# Patient Record
Sex: FEMALE | Race: WHITE | Employment: OTHER | URBAN - METROPOLITAN AREA
[De-identification: names, ages, dates, MRNs, and addresses within clinical notes are randomized per-mention and may not be internally consistent; named-entity substitution may affect disease eponyms.]

---

## 2017-10-11 ENCOUNTER — SURG/PROC ORDERS (OUTPATIENT)
Dept: ANESTHESIOLOGY | Age: 82
End: 2017-10-11

## 2017-10-11 RX ORDER — SODIUM CHLORIDE 0.9 % (FLUSH) 0.9 %
10 SYRINGE (ML) INJECTION PRN
Status: CANCELLED | OUTPATIENT
Start: 2017-10-11

## 2017-10-11 RX ORDER — SODIUM CHLORIDE 0.9 % (FLUSH) 0.9 %
10 SYRINGE (ML) INJECTION EVERY 12 HOURS SCHEDULED
Status: CANCELLED | OUTPATIENT
Start: 2017-10-11

## 2017-10-11 RX ORDER — SODIUM CHLORIDE 9 MG/ML
INJECTION, SOLUTION INTRAVENOUS CONTINUOUS
Status: CANCELLED | OUTPATIENT
Start: 2017-10-11

## 2017-10-12 ENCOUNTER — HOSPITAL ENCOUNTER (OUTPATIENT)
Dept: SURGERY | Age: 82
Discharge: HOME | End: 2017-10-12
Attending: OPHTHALMOLOGY | Admitting: OPHTHALMOLOGY

## 2017-10-12 VITALS
RESPIRATION RATE: 16 BRPM | HEART RATE: 62 BPM | SYSTOLIC BLOOD PRESSURE: 112 MMHG | DIASTOLIC BLOOD PRESSURE: 55 MMHG | OXYGEN SATURATION: 96 % | TEMPERATURE: 97 F

## 2017-10-12 LAB
PERFORMED ON: ABNORMAL
POC POTASSIUM: 3.1 MMOL/L (ref 3.5–5.1)
POC SAMPLE TYPE: ABNORMAL
POTASSIUM: 3.1

## 2017-10-12 RX ORDER — SODIUM CHLORIDE 0.9 % (FLUSH) 0.9 %
10 SYRINGE (ML) INJECTION PRN
Status: DISCONTINUED | OUTPATIENT
Start: 2017-10-12 | End: 2017-10-12 | Stop reason: SDUPTHER

## 2017-10-12 RX ORDER — ONDANSETRON 2 MG/ML
4 INJECTION INTRAMUSCULAR; INTRAVENOUS
Status: ACTIVE | OUTPATIENT
Start: 2017-10-12 | End: 2017-10-12

## 2017-10-12 RX ORDER — CLINDAMYCIN PHOSPHATE 900 MG/50ML
900 INJECTION INTRAVENOUS
Status: COMPLETED | OUTPATIENT
Start: 2017-10-12 | End: 2017-10-12

## 2017-10-12 RX ORDER — FENTANYL CITRATE 50 UG/ML
50 INJECTION, SOLUTION INTRAMUSCULAR; INTRAVENOUS EVERY 5 MIN PRN
Status: DISCONTINUED | OUTPATIENT
Start: 2017-10-12 | End: 2017-10-13 | Stop reason: HOSPADM

## 2017-10-12 RX ORDER — SODIUM CHLORIDE 0.9 % (FLUSH) 0.9 %
10 SYRINGE (ML) INJECTION EVERY 12 HOURS SCHEDULED
Status: DISCONTINUED | OUTPATIENT
Start: 2017-10-12 | End: 2017-10-13 | Stop reason: HOSPADM

## 2017-10-12 RX ORDER — SODIUM CHLORIDE 9 MG/ML
INJECTION, SOLUTION INTRAVENOUS CONTINUOUS
Status: DISCONTINUED | OUTPATIENT
Start: 2017-10-12 | End: 2017-10-13 | Stop reason: HOSPADM

## 2017-10-12 RX ORDER — OXYCODONE HYDROCHLORIDE AND ACETAMINOPHEN 5; 325 MG/1; MG/1
2 TABLET ORAL PRN
Status: ACTIVE | OUTPATIENT
Start: 2017-10-12 | End: 2017-10-12

## 2017-10-12 RX ORDER — MORPHINE SULFATE 4 MG/ML
1 INJECTION, SOLUTION INTRAMUSCULAR; INTRAVENOUS EVERY 5 MIN PRN
Status: DISCONTINUED | OUTPATIENT
Start: 2017-10-12 | End: 2017-10-13 | Stop reason: HOSPADM

## 2017-10-12 RX ORDER — OXYCODONE HYDROCHLORIDE AND ACETAMINOPHEN 5; 325 MG/1; MG/1
1 TABLET ORAL PRN
Status: ACTIVE | OUTPATIENT
Start: 2017-10-12 | End: 2017-10-12

## 2017-10-12 RX ORDER — MEPERIDINE HYDROCHLORIDE 25 MG/ML
12.5 INJECTION INTRAMUSCULAR; INTRAVENOUS; SUBCUTANEOUS EVERY 5 MIN PRN
Status: DISCONTINUED | OUTPATIENT
Start: 2017-10-12 | End: 2017-10-13 | Stop reason: HOSPADM

## 2017-10-12 RX ORDER — MORPHINE SULFATE 4 MG/ML
2 INJECTION, SOLUTION INTRAMUSCULAR; INTRAVENOUS EVERY 5 MIN PRN
Status: DISCONTINUED | OUTPATIENT
Start: 2017-10-12 | End: 2017-10-13 | Stop reason: HOSPADM

## 2017-10-12 RX ORDER — SODIUM CHLORIDE 0.9 % (FLUSH) 0.9 %
10 SYRINGE (ML) INJECTION EVERY 12 HOURS SCHEDULED
Status: DISCONTINUED | OUTPATIENT
Start: 2017-10-12 | End: 2017-10-12 | Stop reason: SDUPTHER

## 2017-10-12 RX ORDER — FENTANYL CITRATE 50 UG/ML
25 INJECTION, SOLUTION INTRAMUSCULAR; INTRAVENOUS EVERY 5 MIN PRN
Status: DISCONTINUED | OUTPATIENT
Start: 2017-10-12 | End: 2017-10-13 | Stop reason: HOSPADM

## 2017-10-12 RX ORDER — SODIUM CHLORIDE 0.9 % (FLUSH) 0.9 %
10 SYRINGE (ML) INJECTION PRN
Status: DISCONTINUED | OUTPATIENT
Start: 2017-10-12 | End: 2017-10-13 | Stop reason: HOSPADM

## 2017-10-12 RX ADMIN — CLINDAMYCIN PHOSPHATE 900 MG: 900 INJECTION INTRAVENOUS at 07:46

## 2017-10-12 ASSESSMENT — PAIN - FUNCTIONAL ASSESSMENT: PAIN_FUNCTIONAL_ASSESSMENT: 0-10

## 2017-10-12 ASSESSMENT — ENCOUNTER SYMPTOMS: SHORTNESS OF BREATH: 1

## 2017-10-12 ASSESSMENT — PAIN SCALES - GENERAL: PAINLEVEL_OUTOF10: 0

## 2017-10-12 NOTE — ANESTHESIA PRE-OP
Department of Anesthesiology  Preprocedure Note       Name:  Sarita Jc   Age:  [de-identified] y.o.  :  1935                                          MRN:  [id number]         Date:  10/12/2017      [facility] Department of Anesthesiology  Pre-Anesthesia Evaluation/Consultation       Name:  Sarita Jc                                         Age:  [de-identified] y.o. MRN:  [id number]           Procedure (Scheduled): B lower lid ectropion rep.   Surgeon:  Dr. Kath Canas Other     Sulfa Antibiotics      Patient denies    Sulfacetamide     Sulfur      Patient Active Problem List   Diagnosis    Vaginal atrophy    History of endometrial cancer    Frequent UTI    H/O total hysterectomy with bilateral salpingo-oophorectomy (BSO)    Encounter for gynecological examination     Past Medical History:   Diagnosis Date    Asthma     Cancer ([facility] [address]     skin cancer basal and squamous    History of blood transfusion     Hyperlipidemia     Hypertension     Leg swelling     Osteoarthritis     UTI symptoms     Venous insufficiency of leg      Past Surgical History:   Procedure Laterality Date    APPENDECTOMY      CARPAL TUNNEL RELEASE      HEMORRHOID SURGERY      HERNIA REPAIR      HYSTERECTOMY      JOINT REPLACEMENT Bilateral     bilateral TKR    NASAL SEPTUM SURGERY      CT DILATION/CURETTAGE,DIAGNOSTIC      D & C    CT TOTAL KNEE ARTHROPLASTY Right 2000    knee surgery    CT VAGINAL HYSTERECTOMY,UTERUS 250 GMS/<  2004    Hysterectomy    TONSILLECTOMY      VEIN SURGERY      legs     Social History   Substance Use Topics    Smoking status: Former Smoker    Smokeless tobacco: Never Used    Alcohol use 0.0 oz/week      Comment: occ     Medications  Current Outpatient Prescriptions on File Prior to Encounter   Medication Sig Dispense Refill    furosemide (LASIX) 20 MG tablet Take 20 mg by mouth 2 times daily      Budesonide-Formoterol Fumarate (SYMBICORT IN) Inhale 2 Inhalers into the lungs as needed      bacitracin 500 UNIT/GM ophthalmic ointment every 4 hours Every 4 hours.  captopril-hydrochlorothiazide (CAPOZIDE) 50-25 MG per tablet Take 1 tablet by mouth daily      conjugated estrogens (PREMARIN) 0.625 MG/GM vaginal cream Place . 5 gram vaginally three times a week at bedtime with applicator. 1 Tube 11    amitriptyline (ELAVIL) 25 MG tablet Take 50 mg by mouth nightly       amLODIPine (NORVASC) 2.5 MG tablet Take 10 mg by mouth daily       nitrofurantoin (MACRODANTIN) 50 MG capsule Take 50 mg by mouth 4 times daily.  Potassium 99 MG TABS Take  by mouth.  simvastatin (ZOCOR) 20 MG tablet Take 20 mg by mouth nightly. No current facility-administered medications on file prior to encounter. Current Outpatient Prescriptions   Medication Sig Dispense Refill    furosemide (LASIX) 20 MG tablet Take 20 mg by mouth 2 times daily      Budesonide-Formoterol Fumarate (SYMBICORT IN) Inhale 2 Inhalers into the lungs as needed      bacitracin 500 UNIT/GM ophthalmic ointment every 4 hours Every 4 hours.  captopril-hydrochlorothiazide (CAPOZIDE) 50-25 MG per tablet Take 1 tablet by mouth daily      conjugated estrogens (PREMARIN) 0.625 MG/GM vaginal cream Place . 5 gram vaginally three times a week at bedtime with applicator. 1 Tube 11    amitriptyline (ELAVIL) 25 MG tablet Take 50 mg by mouth nightly       amLODIPine (NORVASC) 2.5 MG tablet Take 10 mg by mouth daily       nitrofurantoin (MACRODANTIN) 50 MG capsule Take 50 mg by mouth 4 times daily.  Potassium 99 MG TABS Take  by mouth.  simvastatin (ZOCOR) 20 MG tablet Take 20 mg by mouth nightly. No current facility-administered medications for this encounter. Vital Signs (Current) There were no vitals filed for this visit.   Vital Signs Statistics (for past 48 hrs)     No Data Recorded    BP Readings from Last 3 Encounters:   12/07/16 100/68   10/13/16 130/59   09/29/15 124/82 Take 20 mg by mouth nightly. Historical Provider, MD       Current medications:    Current Outpatient Prescriptions   Medication Sig Dispense Refill    furosemide (LASIX) 20 MG tablet Take 20 mg by mouth 2 times daily      Budesonide-Formoterol Fumarate (SYMBICORT IN) Inhale 2 Inhalers into the lungs as needed      bacitracin 500 UNIT/GM ophthalmic ointment every 4 hours Every 4 hours.  captopril-hydrochlorothiazide (CAPOZIDE) 50-25 MG per tablet Take 1 tablet by mouth daily      conjugated estrogens (PREMARIN) 0.625 MG/GM vaginal cream Place . 5 gram vaginally three times a week at bedtime with applicator. 1 Tube 11    amitriptyline (ELAVIL) 25 MG tablet Take 50 mg by mouth nightly       amLODIPine (NORVASC) 2.5 MG tablet Take 10 mg by mouth daily       nitrofurantoin (MACRODANTIN) 50 MG capsule Take 50 mg by mouth 4 times daily.  Potassium 99 MG TABS Take  by mouth.  simvastatin (ZOCOR) 20 MG tablet Take 20 mg by mouth nightly. No current facility-administered medications for this encounter. Allergies:     Allergies   Allergen Reactions    Other     Sulfa Antibiotics      Patient denies    Sulfacetamide     Sulfur        Problem List:    Patient Active Problem List   Diagnosis Code    Vaginal atrophy N95.2    History of endometrial cancer Z85.42    Frequent UTI N39.0    H/O total hysterectomy with bilateral salpingo-oophorectomy (BSO) Z90.710    Encounter for gynecological examination Z01.419       Past Medical History:        Diagnosis Date    Asthma     Cancer ([facility] [address]     skin cancer basal and squamous    History of blood transfusion     Hyperlipidemia     Hypertension     Leg swelling     Osteoarthritis     UTI symptoms     Venous insufficiency of leg        Past Surgical History:        Procedure Laterality Date    APPENDECTOMY  1969    CARPAL TUNNEL RELEASE      HEMORRHOID SURGERY      HERNIA REPAIR      HYSTERECTOMY      JOINT REPLACEMENT Bilateral bilateral TKR    NASAL SEPTUM SURGERY      OR DILATION/CURETTAGE,DIAGNOSTIC      D & C    OR TOTAL KNEE ARTHROPLASTY Right 2000    knee surgery    OR VAGINAL HYSTERECTOMY,UTERUS 250 GMS/<  2004    Hysterectomy    TONSILLECTOMY      VEIN SURGERY      legs       Social History:    Social History   Substance Use Topics    Smoking status: Former Smoker    Smokeless tobacco: Never Used    Alcohol use 0.0 oz/week      Comment: occ                                Counseling given: Not Answered      Vital Signs (Current): There were no vitals filed for this visit. BP Readings from Last 3 Encounters:   12/07/16 100/68   10/13/16 130/59   09/29/15 124/82       NPO Status:  mn+, see mar                                                                               BMI:   Wt Readings from Last 3 Encounters:   10/09/17 230 lb (104.3 kg)   12/07/16 218 lb (98.9 kg)   09/29/15 221 lb (100.2 kg)     There is no height or weight on file to calculate BMI. Anesthesia Evaluation  Patient summary reviewed no history of anesthetic complications:   Airway: Mallampati: III  TM distance: <3 FB   Neck ROM: limited  Mouth opening: > = 3 FB Dental:          Pulmonary: breath sounds clear to auscultation  (+) shortness of breath (exert):  sleep apnea: on noncompliant,  asthma (daily symbicort , stable):                            Cardiovascular:    (+) hypertension:, SHAY:,         Rhythm: regular  Rate: normal           Beta Blocker:  Not on Beta Blocker         Neuro/Psych:   {neg ROS              GI/Hepatic/Renal: neg ROS  (+) renal disease: CRI,           Endo/Other:    (+) : arthritis:. Abdominal:   (+) obese,     Abdomen: soft. Vascular:                                  Anesthesia Plan      MAC     ASA 3       Induction: intravenous. Anesthetic plan and risks discussed with patient. Plan discussed with CRNA.             This pre-anesthesia assessment may be

## 2017-10-12 NOTE — ANESTHESIA POST-OP
[facility] Department of Anesthesiology  Post-Anesthesia Note       Name:  Golden Palmer                                         Age:  [de-identified] y.o. MRN:  [id number]     Last Vitals & Oxygen Saturation: BP (!) 112/55   Pulse 62   Temp 97 °F (36.1 °C) (Temporal)   Resp 16   SpO2 96%   No data found.       Level of consciousness: awake, alert and oriented    Respiratory: stable     Cardiovascular: stable     Hydration: stable     PONV: stable     Post-op pain: adequate analgesia    Post-op assessment: no apparent anesthetic complications and tolerated procedure well    Complications:  none    Haile Joseph MD  October 12, 2017   12:45 PM

## 2017-10-12 NOTE — H&P
Date of Surgery Update:  Britt Cheatham was seen, history and physical examination reviewed, and patient examined by me today.  There have been no significant clinical changes since the completion of the previous history and physical.    Electronically signed by: Kaylie Sanderson MD,10/12/2017,7:15 AM

## 2022-04-01 ENCOUNTER — HOSPITAL ENCOUNTER (OUTPATIENT)
Age: 87
Setting detail: OUTPATIENT SURGERY
Discharge: HOME | End: 2022-04-01
Attending: INTERNAL MEDICINE | Admitting: INTERNAL MEDICINE
Payer: MEDICARE

## 2022-04-01 VITALS
DIASTOLIC BLOOD PRESSURE: 65 MMHG | HEART RATE: 62 BPM | OXYGEN SATURATION: 95 % | RESPIRATION RATE: 15 BRPM | WEIGHT: 205 LBS | BODY MASS INDEX: 38.71 KG/M2 | HEIGHT: 61 IN | SYSTOLIC BLOOD PRESSURE: 145 MMHG

## 2022-04-01 PROCEDURE — 3609019000 HC EGD CAPSULE ENDOSCOPY: Performed by: INTERNAL MEDICINE

## 2022-04-01 PROCEDURE — 2720000010 HC SURG SUPPLY STERILE: Performed by: INTERNAL MEDICINE

## 2022-04-01 ASSESSMENT — PAIN - FUNCTIONAL ASSESSMENT: PAIN_FUNCTIONAL_ASSESSMENT: 0-10

## 2022-04-01 NOTE — PROGRESS NOTES
Written and verbal instructions given to pt. Belt and  applied to pt over clothing.  is flashing blue as verification of device pairing. Pt swallowed capsule without difficulty. Discharge instructions given. Pt discharged per wheelchair.      Capsule ID: [id number]  Lot #: [id number]

## 2022-04-07 NOTE — PROCEDURES
[address] [place] [address] 16                                 PROCEDURE NOTE    PATIENT NAME: Tony Acosta                  :        1935  MED REC NO:   [id number]                          ROOM:  ACCOUNT NO:   [de-identified]                           ADMIT DATE: 2022  PROVIDER:     Elsa Goldstein MD    DATE OF PROCEDURE:  2022    PROCEDURE PERFORMED:  Capsule endoscopy. PRIMARY PROVIDER:  _____    INDICATION:  Anemia. PROCEDURE:  The esophageal picture was obtained at 28 seconds. At 1  minute 11 seconds, a little duodenal erosion was seen. At 1 hour 12  minutes 16 seconds, there were erosions which could be duodenitis. There was an AVM seen in the duodenum at 1 hour 15 minutes and 28  seconds. There were additional AVM seen at 1 hour 31 minutes and 26  seconds and 1 hour 42 minutes and 15 seconds. 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 RECOMMENDATION:  They follow her blood count, that they avoid  anticoagulants, that she continues with iron and if her blood count  drops precipitously, I would consider an upper endoscopy with a  pediatric colonoscope for possible ablation.         Precious Singer MD    D: 2022 15:56:53       T: 2022 18:03:20     [email or web address]  Job#: [id number]     Doc#: [id number]    CC:  Elsa Goldstein MD

## 2022-12-30 ENCOUNTER — HOSPITAL ENCOUNTER (EMERGENCY)
Age: 87
Discharge: ANOTHER HOSPITAL | End: 2022-12-31
Attending: EMERGENCY MEDICINE
Payer: MEDICARE

## 2022-12-30 ENCOUNTER — APPOINTMENT (OUTPATIENT)
Dept: CT IMAGING | Age: 87
End: 2022-12-30
Payer: MEDICARE

## 2022-12-30 ENCOUNTER — APPOINTMENT (OUTPATIENT)
Dept: GENERAL RADIOLOGY | Age: 87
End: 2022-12-30
Payer: MEDICARE

## 2022-12-30 DIAGNOSIS — R77.8 ELEVATED TROPONIN: ICD-10-CM

## 2022-12-30 DIAGNOSIS — S06.33AA FOCAL HEMORRHAGIC CONTUSION OF CEREBRUM: ICD-10-CM

## 2022-12-30 DIAGNOSIS — S06.6XAA SUBARACHNOID HEMATOMA, WITH UNKNOWN LOSS OF CONSCIOUSNESS STATUS, INITIAL ENCOUNTER: Primary | ICD-10-CM

## 2022-12-30 DIAGNOSIS — N17.9 AKI (ACUTE KIDNEY INJURY) (HCC): ICD-10-CM

## 2022-12-30 LAB
A/G RATIO: 1.2 (ref 1.1–2.2)
ALBUMIN SERPL-MCNC: 3.4 G/DL (ref 3.4–5)
ALP BLD-CCNC: 149 U/L (ref 40–129)
ALT SERPL-CCNC: 11 U/L (ref 10–40)
ANION GAP SERPL CALCULATED.3IONS-SCNC: 9 MMOL/L (ref 3–16)
APTT: 35.4 SEC (ref 23–34.3)
AST SERPL-CCNC: 21 U/L (ref 15–37)
BASOPHILS ABSOLUTE: 0.1 K/UL (ref 0–0.2)
BASOPHILS RELATIVE PERCENT: 1.8 %
BILIRUB SERPL-MCNC: 0.5 MG/DL (ref 0–1)
BILIRUBIN URINE: NEGATIVE
BLOOD, URINE: NEGATIVE
BUN BLDV-MCNC: 17 MG/DL (ref 7–20)
CALCIUM SERPL-MCNC: 9.1 MG/DL (ref 8.3–10.6)
CHLORIDE BLD-SCNC: 99 MMOL/L (ref 99–110)
CLARITY: CLEAR
CO2: 30 MMOL/L (ref 21–32)
COLOR: YELLOW
CREAT SERPL-MCNC: 1.7 MG/DL (ref 0.6–1.2)
EOSINOPHILS ABSOLUTE: 0.1 K/UL (ref 0–0.6)
EOSINOPHILS RELATIVE PERCENT: 2.1 %
GFR SERPL CREATININE-BSD FRML MDRD: 29 ML/MIN/{1.73_M2}
GLUCOSE BLD-MCNC: 108 MG/DL (ref 70–99)
GLUCOSE URINE: NEGATIVE MG/DL
HCT VFR BLD CALC: 29 % (ref 36–48)
HEMOGLOBIN: 9.2 G/DL (ref 12–16)
INR BLD: 1.06 (ref 0.87–1.14)
KETONES, URINE: NEGATIVE MG/DL
LEUKOCYTE ESTERASE, URINE: NEGATIVE
LYMPHOCYTES ABSOLUTE: 1.1 K/UL (ref 1–5.1)
LYMPHOCYTES RELATIVE PERCENT: 29.1 %
MCH RBC QN AUTO: 28.9 PG (ref 26–34)
MCHC RBC AUTO-ENTMCNC: 31.9 G/DL (ref 31–36)
MCV RBC AUTO: 90.7 FL (ref 80–100)
MICROSCOPIC EXAMINATION: ABNORMAL
MONOCYTES ABSOLUTE: 0.5 K/UL (ref 0–1.3)
MONOCYTES RELATIVE PERCENT: 13.1 %
NEUTROPHILS ABSOLUTE: 2 K/UL (ref 1.7–7.7)
NEUTROPHILS RELATIVE PERCENT: 53.9 %
NITRITE, URINE: NEGATIVE
PDW BLD-RTO: 16.8 % (ref 12.4–15.4)
PH UA: 7.5 (ref 5–8)
PLATELET # BLD: 167 K/UL (ref 135–450)
PMV BLD AUTO: 9 FL (ref 5–10.5)
POTASSIUM SERPL-SCNC: 3.9 MMOL/L (ref 3.5–5.1)
PROTEIN UA: NEGATIVE MG/DL
PROTHROMBIN TIME: 13.7 SEC (ref 11.7–14.5)
RBC # BLD: 3.2 M/UL (ref 4–5.2)
SARS-COV-2, NAAT: NOT DETECTED
SODIUM BLD-SCNC: 138 MMOL/L (ref 136–145)
SPECIFIC GRAVITY UA: 1.01 (ref 1–1.03)
TOTAL PROTEIN: 6.3 G/DL (ref 6.4–8.2)
TROPONIN: 0.02 NG/ML
URINE REFLEX TO CULTURE: ABNORMAL
URINE TYPE: ABNORMAL
UROBILINOGEN, URINE: 2 E.U./DL
WBC # BLD: 3.7 K/UL (ref 4–11)

## 2022-12-30 PROCEDURE — 72125 CT NECK SPINE W/O DYE: CPT

## 2022-12-30 PROCEDURE — 70450 CT HEAD/BRAIN W/O DYE: CPT

## 2022-12-30 PROCEDURE — 6370000000 HC RX 637 (ALT 250 FOR IP): Performed by: PHYSICIAN ASSISTANT

## 2022-12-30 PROCEDURE — 99285 EMERGENCY DEPT VISIT HI MDM: CPT

## 2022-12-30 PROCEDURE — 81003 URINALYSIS AUTO W/O SCOPE: CPT

## 2022-12-30 PROCEDURE — 85730 THROMBOPLASTIN TIME PARTIAL: CPT

## 2022-12-30 PROCEDURE — 70496 CT ANGIOGRAPHY HEAD: CPT

## 2022-12-30 PROCEDURE — 85610 PROTHROMBIN TIME: CPT

## 2022-12-30 PROCEDURE — 6360000004 HC RX CONTRAST MEDICATION: Performed by: EMERGENCY MEDICINE

## 2022-12-30 PROCEDURE — 80053 COMPREHEN METABOLIC PANEL: CPT

## 2022-12-30 PROCEDURE — 93005 ELECTROCARDIOGRAM TRACING: CPT | Performed by: EMERGENCY MEDICINE

## 2022-12-30 PROCEDURE — 85025 COMPLETE CBC W/AUTO DIFF WBC: CPT

## 2022-12-30 PROCEDURE — 71045 X-RAY EXAM CHEST 1 VIEW: CPT

## 2022-12-30 PROCEDURE — 87635 SARS-COV-2 COVID-19 AMP PRB: CPT

## 2022-12-30 PROCEDURE — 84484 ASSAY OF TROPONIN QUANT: CPT

## 2022-12-30 RX ORDER — ACETAMINOPHEN 325 MG/1
650 TABLET ORAL ONCE
Status: COMPLETED | OUTPATIENT
Start: 2022-12-30 | End: 2022-12-30

## 2022-12-30 RX ADMIN — IOPAMIDOL 75 ML: 755 INJECTION, SOLUTION INTRAVENOUS at 22:28

## 2022-12-30 RX ADMIN — ACETAMINOPHEN 650 MG: 325 TABLET, FILM COATED ORAL at 21:21

## 2022-12-30 ASSESSMENT — ENCOUNTER SYMPTOMS
CONSTIPATION: 0
ABDOMINAL PAIN: 0
EYE DISCHARGE: 0
EYE ITCHING: 0
VOMITING: 0
COUGH: 0
COLOR CHANGE: 0
SHORTNESS OF BREATH: 0

## 2022-12-30 ASSESSMENT — PAIN SCALES - GENERAL
PAINLEVEL_OUTOF10: 4
PAINLEVEL_OUTOF10: 7

## 2022-12-30 ASSESSMENT — PAIN - FUNCTIONAL ASSESSMENT: PAIN_FUNCTIONAL_ASSESSMENT: 0-10

## 2022-12-30 ASSESSMENT — PAIN DESCRIPTION - LOCATION: LOCATION: HEAD

## 2022-12-30 ASSESSMENT — LIFESTYLE VARIABLES
HOW MANY STANDARD DRINKS CONTAINING ALCOHOL DO YOU HAVE ON A TYPICAL DAY: PATIENT DOES NOT DRINK
HOW OFTEN DO YOU HAVE A DRINK CONTAINING ALCOHOL: NEVER

## 2022-12-31 ENCOUNTER — HOSPITAL ENCOUNTER (INPATIENT)
Age: 87
LOS: 5 days | Discharge: NURSING, REHAB OR LONG-TERM CARE | DRG: 083 | End: 2023-01-05
Attending: INTERNAL MEDICINE | Admitting: INTERNAL MEDICINE
Payer: MEDICARE

## 2022-12-31 ENCOUNTER — APPOINTMENT (OUTPATIENT)
Dept: CT IMAGING | Age: 87
DRG: 083 | End: 2022-12-31
Attending: INTERNAL MEDICINE
Payer: MEDICARE

## 2022-12-31 VITALS
OXYGEN SATURATION: 99 % | RESPIRATION RATE: 14 BRPM | TEMPERATURE: 97.3 F | HEART RATE: 63 BPM | BODY MASS INDEX: 33.78 KG/M2 | DIASTOLIC BLOOD PRESSURE: 46 MMHG | WEIGHT: 178.79 LBS | SYSTOLIC BLOOD PRESSURE: 113 MMHG

## 2022-12-31 PROBLEM — I62.9 INTRACRANIAL HEMORRHAGE (HCC): Status: ACTIVE | Noted: 2022-12-31

## 2022-12-31 PROBLEM — J44.9 COPD (CHRONIC OBSTRUCTIVE PULMONARY DISEASE) (HCC): Status: ACTIVE | Noted: 2022-12-31

## 2022-12-31 PROBLEM — I62.9 ACUTE INTRA-CRANIAL HEMORRHAGE (HCC): Status: ACTIVE | Noted: 2022-12-31

## 2022-12-31 PROBLEM — E78.5 HYPERLIPIDEMIA: Status: ACTIVE | Noted: 2022-12-31

## 2022-12-31 PROBLEM — I10 HYPERTENSION: Status: ACTIVE | Noted: 2022-12-31

## 2022-12-31 PROBLEM — N17.9 AKI (ACUTE KIDNEY INJURY) (HCC): Status: ACTIVE | Noted: 2022-12-31

## 2022-12-31 PROBLEM — D64.9 NORMOCYTIC ANEMIA: Status: ACTIVE | Noted: 2022-12-31

## 2022-12-31 LAB
ANION GAP SERPL CALCULATED.3IONS-SCNC: 9 MMOL/L (ref 3–16)
BUN BLDV-MCNC: 14 MG/DL (ref 7–20)
CALCIUM SERPL-MCNC: 7.9 MG/DL (ref 8.3–10.6)
CHLORIDE BLD-SCNC: 102 MMOL/L (ref 99–110)
CO2: 26 MMOL/L (ref 21–32)
CREAT SERPL-MCNC: 1.3 MG/DL (ref 0.6–1.2)
CREATININE URINE: 44.4 MG/DL (ref 28–259)
EKG ATRIAL RATE: 58 BPM
EKG DIAGNOSIS: NORMAL
EKG P-R INTERVAL: 220 MS
EKG Q-T INTERVAL: 472 MS
EKG QRS DURATION: 104 MS
EKG QTC CALCULATION (BAZETT): 463 MS
EKG R AXIS: -11 DEGREES
EKG T AXIS: 27 DEGREES
EKG VENTRICULAR RATE: 58 BPM
GFR SERPL CREATININE-BSD FRML MDRD: 40 ML/MIN/{1.73_M2}
GLUCOSE BLD-MCNC: 83 MG/DL (ref 70–99)
HCT VFR BLD CALC: 25.5 % (ref 36–48)
HEMOGLOBIN: 8.2 G/DL (ref 12–16)
MCH RBC QN AUTO: 29.3 PG (ref 26–34)
MCHC RBC AUTO-ENTMCNC: 32.2 G/DL (ref 31–36)
MCV RBC AUTO: 90.9 FL (ref 80–100)
PDW BLD-RTO: 16.9 % (ref 12.4–15.4)
PLATELET # BLD: 162 K/UL (ref 135–450)
PMV BLD AUTO: 9.7 FL (ref 5–10.5)
POTASSIUM SERPL-SCNC: 4 MMOL/L (ref 3.5–5.1)
RBC # BLD: 2.81 M/UL (ref 4–5.2)
SODIUM BLD-SCNC: 137 MMOL/L (ref 136–145)
SODIUM URINE: 109 MMOL/L
TROPONIN: 0.01 NG/ML
WBC # BLD: 4 K/UL (ref 4–11)

## 2022-12-31 PROCEDURE — 6370000000 HC RX 637 (ALT 250 FOR IP): Performed by: STUDENT IN AN ORGANIZED HEALTH CARE EDUCATION/TRAINING PROGRAM

## 2022-12-31 PROCEDURE — APPNB45 APP NON BILLABLE 31-45 MINUTES

## 2022-12-31 PROCEDURE — 99223 1ST HOSP IP/OBS HIGH 75: CPT | Performed by: PSYCHIATRY & NEUROLOGY

## 2022-12-31 PROCEDURE — 36415 COLL VENOUS BLD VENIPUNCTURE: CPT

## 2022-12-31 PROCEDURE — 80048 BASIC METABOLIC PNL TOTAL CA: CPT

## 2022-12-31 PROCEDURE — 94640 AIRWAY INHALATION TREATMENT: CPT

## 2022-12-31 PROCEDURE — 2580000003 HC RX 258

## 2022-12-31 PROCEDURE — 6360000002 HC RX W HCPCS

## 2022-12-31 PROCEDURE — 85027 COMPLETE CBC AUTOMATED: CPT

## 2022-12-31 PROCEDURE — 99222 1ST HOSP IP/OBS MODERATE 55: CPT | Performed by: INTERNAL MEDICINE

## 2022-12-31 PROCEDURE — 94761 N-INVAS EAR/PLS OXIMETRY MLT: CPT

## 2022-12-31 PROCEDURE — 2000000000 HC ICU R&B

## 2022-12-31 PROCEDURE — 84484 ASSAY OF TROPONIN QUANT: CPT

## 2022-12-31 PROCEDURE — 70450 CT HEAD/BRAIN W/O DYE: CPT

## 2022-12-31 PROCEDURE — 82570 ASSAY OF URINE CREATININE: CPT

## 2022-12-31 PROCEDURE — 84300 ASSAY OF URINE SODIUM: CPT

## 2022-12-31 PROCEDURE — 94664 DEMO&/EVAL PT USE INHALER: CPT

## 2022-12-31 RX ORDER — ONDANSETRON 2 MG/ML
4 INJECTION INTRAMUSCULAR; INTRAVENOUS EVERY 6 HOURS PRN
Status: CANCELLED | OUTPATIENT
Start: 2022-12-31

## 2022-12-31 RX ORDER — LEVOTHYROXINE SODIUM 0.07 MG/1
75 TABLET ORAL DAILY
COMMUNITY

## 2022-12-31 RX ORDER — ARFORMOTEROL TARTRATE 15 UG/2ML
15 SOLUTION RESPIRATORY (INHALATION) 2 TIMES DAILY
Status: DISCONTINUED | OUTPATIENT
Start: 2022-12-31 | End: 2023-01-05 | Stop reason: HOSPADM

## 2022-12-31 RX ORDER — IPRATROPIUM BROMIDE AND ALBUTEROL SULFATE 2.5; .5 MG/3ML; MG/3ML
1 SOLUTION RESPIRATORY (INHALATION) 2 TIMES DAILY
Status: DISCONTINUED | OUTPATIENT
Start: 2022-12-31 | End: 2023-01-01

## 2022-12-31 RX ORDER — TORSEMIDE 20 MG/1
10 TABLET ORAL DAILY
Status: ON HOLD | COMMUNITY
End: 2023-01-05 | Stop reason: HOSPADM

## 2022-12-31 RX ORDER — LEVETIRACETAM 5 MG/ML
500 INJECTION INTRAVASCULAR EVERY 12 HOURS
Status: DISCONTINUED | OUTPATIENT
Start: 2022-12-31 | End: 2022-12-31

## 2022-12-31 RX ORDER — ONDANSETRON 2 MG/ML
4 INJECTION INTRAMUSCULAR; INTRAVENOUS EVERY 6 HOURS PRN
Status: DISCONTINUED | OUTPATIENT
Start: 2022-12-31 | End: 2022-12-31

## 2022-12-31 RX ORDER — ACETAMINOPHEN 325 MG/1
650 TABLET ORAL EVERY 4 HOURS PRN
Status: DISCONTINUED | OUTPATIENT
Start: 2022-12-31 | End: 2022-12-31 | Stop reason: SDUPTHER

## 2022-12-31 RX ORDER — ASPIRIN 81 MG/1
81 TABLET ORAL DAILY
Status: ON HOLD | COMMUNITY
End: 2023-01-05 | Stop reason: HOSPADM

## 2022-12-31 RX ORDER — AMIODARONE HYDROCHLORIDE 200 MG/1
200 TABLET ORAL DAILY
Status: ON HOLD | COMMUNITY
End: 2023-01-05 | Stop reason: HOSPADM

## 2022-12-31 RX ORDER — MULTIVIT-MIN/IRON/FOLIC ACID/K 18-600-40
4000 CAPSULE ORAL DAILY
COMMUNITY

## 2022-12-31 RX ORDER — ESTRADIOL 0.1 MG/G
0.5 CREAM VAGINAL SEE ADMIN INSTRUCTIONS
COMMUNITY

## 2022-12-31 RX ORDER — MONTELUKAST SODIUM 10 MG/1
10 TABLET ORAL DAILY
COMMUNITY

## 2022-12-31 RX ORDER — BUDESONIDE AND FORMOTEROL FUMARATE DIHYDRATE 80; 4.5 UG/1; UG/1
2 AEROSOL RESPIRATORY (INHALATION) PRN
Status: DISCONTINUED | OUTPATIENT
Start: 2022-12-31 | End: 2022-12-31

## 2022-12-31 RX ORDER — BUDESONIDE 0.5 MG/2ML
0.5 INHALANT ORAL 2 TIMES DAILY
Status: DISCONTINUED | OUTPATIENT
Start: 2022-12-31 | End: 2023-01-05 | Stop reason: HOSPADM

## 2022-12-31 RX ORDER — ALLOPURINOL 100 MG/1
100 TABLET ORAL DAILY
COMMUNITY

## 2022-12-31 RX ORDER — ACETAMINOPHEN 325 MG/1
650 TABLET ORAL EVERY 4 HOURS PRN
Status: CANCELLED | OUTPATIENT
Start: 2022-12-31

## 2022-12-31 RX ORDER — ONDANSETRON 4 MG/1
4 TABLET, ORALLY DISINTEGRATING ORAL EVERY 8 HOURS PRN
Status: DISCONTINUED | OUTPATIENT
Start: 2022-12-31 | End: 2022-12-31

## 2022-12-31 RX ORDER — ONDANSETRON 4 MG/1
4 TABLET, ORALLY DISINTEGRATING ORAL EVERY 8 HOURS PRN
Status: CANCELLED | OUTPATIENT
Start: 2022-12-31

## 2022-12-31 RX ORDER — IPRATROPIUM BROMIDE AND ALBUTEROL SULFATE 2.5; .5 MG/3ML; MG/3ML
1 SOLUTION RESPIRATORY (INHALATION) EVERY 4 HOURS PRN
Status: DISCONTINUED | OUTPATIENT
Start: 2022-12-31 | End: 2023-01-01 | Stop reason: SDUPTHER

## 2022-12-31 RX ORDER — LEVETIRACETAM 5 MG/ML
500 INJECTION INTRAVASCULAR EVERY 12 HOURS
Status: CANCELLED | OUTPATIENT
Start: 2022-12-31 | End: 2023-01-07

## 2022-12-31 RX ORDER — ACETAMINOPHEN 325 MG/1
650 TABLET ORAL EVERY 4 HOURS PRN
Status: DISCONTINUED | OUTPATIENT
Start: 2022-12-31 | End: 2022-12-31 | Stop reason: HOSPADM

## 2022-12-31 RX ORDER — LABETALOL HYDROCHLORIDE 5 MG/ML
10 INJECTION, SOLUTION INTRAVENOUS EVERY 10 MIN PRN
Status: CANCELLED | OUTPATIENT
Start: 2022-12-31

## 2022-12-31 RX ADMIN — LEVETIRACETAM 500 MG: 100 INJECTION INTRAVENOUS at 07:41

## 2022-12-31 RX ADMIN — ARFORMOTEROL TARTRATE 15 MCG: 15 SOLUTION RESPIRATORY (INHALATION) at 10:21

## 2022-12-31 RX ADMIN — BUDESONIDE 500 MCG: 0.5 SUSPENSION RESPIRATORY (INHALATION) at 10:21

## 2022-12-31 RX ADMIN — IPRATROPIUM BROMIDE AND ALBUTEROL SULFATE 1 AMPULE: 2.5; .5 SOLUTION RESPIRATORY (INHALATION) at 20:36

## 2022-12-31 RX ADMIN — ARFORMOTEROL TARTRATE 15 MCG: 15 SOLUTION RESPIRATORY (INHALATION) at 20:37

## 2022-12-31 RX ADMIN — LEVETIRACETAM 500 MG: 100 INJECTION INTRAVENOUS at 19:54

## 2022-12-31 RX ADMIN — BUDESONIDE 500 MCG: 0.5 SUSPENSION RESPIRATORY (INHALATION) at 20:36

## 2022-12-31 ASSESSMENT — PAIN DESCRIPTION - LOCATION
LOCATION: HEAD

## 2022-12-31 ASSESSMENT — PAIN SCALES - GENERAL
PAINLEVEL_OUTOF10: 5
PAINLEVEL_OUTOF10: 6
PAINLEVEL_OUTOF10: 4
PAINLEVEL_OUTOF10: 5
PAINLEVEL_OUTOF10: 2

## 2022-12-31 ASSESSMENT — PAIN - FUNCTIONAL ASSESSMENT
PAIN_FUNCTIONAL_ASSESSMENT: ACTIVITIES ARE NOT PREVENTED
PAIN_FUNCTIONAL_ASSESSMENT: ACTIVITIES ARE NOT PREVENTED

## 2022-12-31 ASSESSMENT — PAIN DESCRIPTION - DESCRIPTORS
DESCRIPTORS: ACHING
DESCRIPTORS: ACHING

## 2022-12-31 ASSESSMENT — PAIN DESCRIPTION - PAIN TYPE
TYPE: ACUTE PAIN
TYPE: ACUTE PAIN

## 2022-12-31 ASSESSMENT — PAIN DESCRIPTION - ORIENTATION
ORIENTATION: LEFT
ORIENTATION: LEFT

## 2022-12-31 NOTE — ED PROVIDER NOTES
I PERSONALLY SAW THE PATIENT AND PERFORMED A SUBSTANTIVE PORTION OF THE VISIT INCLUDING ALL ASPECTS OF THE MEDICAL DECISION MAKING PROCESS. [address]      Pt Name: Bossman Jackson  MRN: [id number]  MEDSXDZZB 1935  Date of evaluation: 12/30/2022  Provider: Baldomero Thorpe MD    CHIEF COMPLAINT       Chief Complaint   Patient presents with    [address] is a 81 y.o. female who presents to the emergency department with fall. Positive for head trauma. Patient presents with fall from home. Unclear if she had syncopal episode or tripped and fell. Does not remember. Positive LOC. +8 of 10 achy head pain. Worse with movement. Better with rest.  Start spontaneously. Constant in nature. Never happened before. Not on any blood thinners. No other associated symptoms. Nursing Notes were reviewed. Including nursing noted for FM, Surgical History, Past Medical History, Social History, vitals, and allergies; agree with all. REVIEW OF SYSTEMS       Review of Systems   Constitutional:  Negative for diaphoresis and unexpected weight change. HENT:  Negative for congestion and dental problem. Eyes:  Negative for discharge and itching. Respiratory:  Negative for cough and shortness of breath. Cardiovascular:  Negative for chest pain and leg swelling. Gastrointestinal:  Negative for abdominal pain, constipation and vomiting. Endocrine: Negative for cold intolerance and heat intolerance. Genitourinary:  Negative for vaginal bleeding, vaginal discharge and vaginal pain. Musculoskeletal:  Negative for neck pain and neck stiffness. Skin:  Negative for color change and pallor. Neurological:  Negative for tremors and weakness. Psychiatric/Behavioral:  Negative for agitation and behavioral problems.       Except as noted above the remainder of the review of systems was reviewed and negative. PAST MEDICAL HISTORY     Past Medical History:   Diagnosis Date    Asthma     Cancer ([address]     skin cancer basal and squamous    History of blood transfusion     Hyperlipidemia     Hypertension     Leg swelling     Osteoarthritis     UTI symptoms     Venous insufficiency of leg        SURGICAL HISTORY       Past Surgical History:   Procedure Laterality Date    APPENDECTOMY  1969    CAPSULE ENDOSCOPY N/A 4/1/2022    [facility]      JOINT REPLACEMENT Bilateral     bilateral TKR    NASAL SEPTUM SURGERY      NC DILATION/CURETTAGE,DIAGNOSTIC      D & C    NC TOTAL KNEE ARTHROPLASTY Right 2000    knee surgery    NC VAGINAL HYSTERECTOMY,UTERUS 250 GMS/<  2004    Hysterectomy    TONSILLECTOMY      VEIN SURGERY      legs       CURRENT MEDICATIONS       Previous Medications    AMITRIPTYLINE (ELAVIL) 25 MG TABLET    Take 50 mg by mouth nightly     AMLODIPINE (NORVASC) 2.5 MG TABLET    Take 10 mg by mouth daily     BACITRACIN 500 UNIT/GM OPHTHALMIC OINTMENT    every 4 hours Every 4 hours. BUDESONIDE-FORMOTEROL FUMARATE (SYMBICORT IN)    Inhale 2 Inhalers into the lungs as needed    CAPTOPRIL-HYDROCHLOROTHIAZIDE (CAPOZIDE) 50-25 MG PER TABLET    Take 1 tablet by mouth daily    CONJUGATED ESTROGENS (PREMARIN) 0.625 MG/GM VAGINAL CREAM    Place . 5 gram vaginally three times a week at bedtime with applicator. FUROSEMIDE (LASIX) 20 MG TABLET    Take 20 mg by mouth 2 times daily    NITROFURANTOIN (MACRODANTIN) 50 MG CAPSULE    Take 50 mg by mouth 4 times daily. POTASSIUM 99 MG TABS    Take  by mouth. SIMVASTATIN (ZOCOR) 20 MG TABLET    Take 20 mg by mouth nightly.        ALLERGIES     Other    FAMILY HISTORY        Family History   Problem Relation Age of Onset    Hypertension Mother     Obesity Mother     Alzheimer's Disease Father        SOCIAL HISTORY       Social History     Socioeconomic History    Marital status:    Tobacco Use    Smoking status: Former    Smokeless tobacco: Never   Substance and Sexual Activity    Alcohol use: Yes     Alcohol/week: 0.0 standard drinks     Comment: occ    Drug use: No       PHYSICAL EXAM       ED Triage Vitals [12/30/22 2005]   BP Temp Temp Source Heart Rate Resp SpO2 Height Weight   (!) 140/43 97.3 °F (36.3 °C) Oral 57 18 100 % -- 178 lb 12.7 oz (81.1 kg)       Physical Exam  Vitals and nursing note reviewed. Constitutional:       General: She is not in acute distress. Appearance: She is well-developed. She is not ill-appearing, toxic-appearing or diaphoretic. HENT:      Head: Normocephalic. Comments: Scalp contusion/hematoma noted     Right Ear: External ear normal.      Left Ear: External ear normal.   Eyes:      General:         Right eye: No discharge. Left eye: No discharge. Conjunctiva/sclera: Conjunctivae normal.      Pupils: Pupils are equal, round, and reactive to light. Cardiovascular:      Rate and Rhythm: Normal rate and regular rhythm. Heart sounds: No murmur heard. Pulmonary:      Effort: Pulmonary effort is normal. No respiratory distress. Breath sounds: Normal breath sounds. No wheezing or rales. Abdominal:      General: Bowel sounds are normal. There is no distension. Palpations: Abdomen is soft. There is no mass. Tenderness: There is no abdominal tenderness. There is no guarding or rebound. Genitourinary:     Comments: Deferred  Musculoskeletal:         General: No deformity. Normal range of motion. Cervical back: Normal range of motion and neck supple. Skin:     General: Skin is warm. Findings: No erythema or rash. Neurological:      Mental Status: She is alert and oriented to person, place, and time. She is not disoriented. Cranial Nerves: No cranial nerve deficit. Motor: No atrophy or abnormal muscle tone.       Coordination: Coordination normal.   Psychiatric:         Behavior: Behavior normal.         Thought Content: Thought content normal.       DIAGNOSTIC RESULTS     EKG: All EKG's are interpreted by the Emergency Department Physician who either signs or Co-signs this chart in the absence of acardiologist.    Interpreted by myself     RADIOLOGY:   Non-plain film images such as CT, Ultrasoundand MRI are read by the radiologist. Plain radiographic images are visualized and preliminarily interpreted by the emergency physician with the below findings:    CT head interpreted myself shows brain bleed    ED BEDSIDE ULTRASOUND:   Performed by ED Physician - none    LABS:  Labs Reviewed   CBC WITH AUTO DIFFERENTIAL - Abnormal; Notable for the following components:       Result Value    WBC 3.7 (*)     RBC 3.20 (*)     Hemoglobin 9.2 (*)     Hematocrit 29.0 (*)     RDW 16.8 (*)     All other components within normal limits   COMPREHENSIVE METABOLIC PANEL - Abnormal; Notable for the following components:    Glucose 108 (*)     Creatinine 1.7 (*)     Est, Glom Filt Rate 29 (*)     Total Protein 6.3 (*)     Alkaline Phosphatase 149 (*)     All other components within normal limits   TROPONIN - Abnormal; Notable for the following components:    Troponin 0.02 (*)     All other components within normal limits   URINALYSIS WITH REFLEX TO CULTURE - Abnormal; Notable for the following components:    Urobilinogen, Urine 2.0 (*)     All other components within normal limits   APTT - Abnormal; Notable for the following components:    aPTT 35.4 (*)     All other components within normal limits   COVID-19, RAPID   PROTIME-INR       All other labs were withinnormal range or not returned as of this dictation.     EMERGENCY DEPARTMENT COURSE and DIFFERENTIAL DIAGNOSIS/MDM:     PMH, Surgical Hx, FH, Social Hx reviewed by myself (ETOH usage, Tobacco usage, Drug usage reviewed by myself, no pertinent Hx)- No Pertinent Hx     Old records were reviewed by me MDM 81year-old with intracranial hemorrhage. Keep blood pressure less than 160. Not on anticoagulation. Neurosurgery consulted. They recommended transfer to [facility]  Discussed with hospitalist who accepted transfer. Patient will be admitted for further inpatient evaluation. DDX-contusion versus brain bleed  Social Determinants (Poverty, Education, uninsured) -none  Prior notes-reviewed PMD notes  Name and route all medications-none  Charting interpretations all by myself-CT head and CTA head and neck reviewed showing intercerebral hemorrhage  Diagnosis descriptions-severe brain bleed  Consults-neurosurgery and hospitalist  Disposition- Considered -admission was considered will be admitted to [address] VISIT INCLUDING ALL ASPECTS OF [facility]. The primary clinician of record [address]   Total Critical Caretime was 99 minutes, excluding separately reportable procedures. There was a high probability of clinically significant/life threatening deterioration in the patient's condition which required my urgent intervention. CRITICAL CARE  I personally saw the patient and independently provided 99 minutes of non-concurrent critical care out of the total shared critical care time provided. This excludes seperately billable procedures. Critical care time was provided for patient as above that required close evaluation and/or intervention with concern for potential patient decompensation. PROCEDURES:  Unlessotherwise noted below, none    FINAL IMPRESSION      1. Subarachnoid hematoma, with unknown loss of consciousness status, initial encounter    2. Focal hemorrhagic contusion of cerebrum    3.  KYARA (acute kidney injury) ([facility] [address]    4. Elevated troponin          DISPOSITION/PLAN   DISPOSITION      Admission    (Please note that portions ofthis note were completed with a voice recognition program.  Efforts were made to edit the dictations but occasionally words are mis-transcribed.)    Evonne Dawson MD(electronically signed)  Attending Emergency Physician          Evonne Dawson MD  12/30/22 [id number]

## 2022-12-31 NOTE — H&P
ICU H&P/consult    Patient Name: Jenny Gil Date: 12/31/2022   Code:Full Code  PCP: Armani Crisostomo     Chief Complaint: No chief complaint on file. Subjective     HPI:   Catalina Kidd is a 81 y.o. female with PMHx of A fib (watchman device), COPD (Home oxygen), Venous insufficiency, HTN, multiple AVMs (capsule endoscopy)  multiple falls past couple of months (3 in last week), Obstructive sleep apnea hypopnea, CKD Stage 3 and Heart failure who presented to the emergency department of [facility] with fall. As per patient she was lifting a heavy grocery and putting in the car afterwards she does not remember anything until she was sitting in the car with help her. She was informed that she fell and hit her head on the pole of handicap sign. Patient also complained of headache with bleeding laceration and also pain in right leg with laceration. Patient also reported that she has started seeing cardiologist for heart failure, and nephrologist for kidney injury after one fall. In the ED of [facility] patient was found oriented x 4 with  scalp contusion/hematoma noted and laceration on right leg. She was vitally stable. Labs were remarkable for elevated creatinine 1.7 (may be chronic), and elevated trops (0.02). 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 Neurosurgery was consulted and patient was transferred to [facility] ICU    On my interview here at [facility]. patient was oriented x3 with slight distress due to head trauma. She complains of headache and leg pain. However, she denies any vision changes, chest pain, chest tightness, light headedness, abd pain, change in urinary or bowel habits, or any unintended weight change. Review of Systems   Constitutional: Negative. HENT:  Positive for facial swelling. Eyes: Negative. Respiratory: Negative. Cardiovascular: Negative.     Gastrointestinal: Negative. Endocrine: Negative. Genitourinary: Negative. Musculoskeletal: Negative. Skin:  Positive for wound. Neurological:  Positive for light-headedness and headaches. Psychiatric/Behavioral: Negative. PMHx:      Diagnosis Date    Asthma     Cancer ([address]     skin cancer basal and squamous    History of blood transfusion     Hyperlipidemia     Hypertension     Leg swelling     Osteoarthritis     UTI symptoms     Venous insufficiency of leg        PSurgHx:      Procedure Laterality Date    APPENDECTOMY  1969    CAPSULE ENDOSCOPY N/A 4/1/2022    CAPSULE ENDOSCOPY performed by Gallo Barrios MD at [address] [place] [address]      JOINT REPLACEMENT Bilateral     bilateral TKR    NASAL SEPTUM SURGERY      FL DILATION/CURETTAGE,DIAGNOSTIC      D & C    FL TOTAL KNEE ARTHROPLASTY Right 2000    knee surgery    FL VAGINAL HYSTERECTOMY,UTERUS 250 GMS/<  2004    Hysterectomy    TONSILLECTOMY      VEIN SURGERY      legs        Medication:  Current Facility-Administered Medications on File Prior to Encounter   Medication Dose Route Frequency Provider Last Rate Last Admin    acetaminophen (TYLENOL) tablet 650 mg  650 mg Oral Q4H PRN Saman Romo MD        ondansetron (ZOFRAN-ODT) disintegrating tablet 4 mg  4 mg Oral Q8H PRN Saman Romo MD        Or    ondansetron [facility]) injection 4 mg  4 mg IntraVENous Q6H PRN Saman Romo MD        levETIRAcetam (KEPPRA) 500 mg/100 mL IVPB  500 mg IntraVENous Q12H Saman Romo MD         Current Outpatient Medications on File Prior to Encounter   Medication Sig Dispense Refill    furosemide (LASIX) 20 MG tablet Take 20 mg by mouth 2 times daily      Budesonide-Formoterol Fumarate (SYMBICORT IN) Inhale 2 Inhalers into the lungs as needed      bacitracin 500 UNIT/GM ophthalmic ointment every 4 hours Every 4 hours.       captopril-hydrochlorothiazide (CAPOZIDE) 50-25 MG per tablet Take 1 tablet by mouth daily      conjugated estrogens (PREMARIN) 0.625 MG/GM vaginal cream Place . 5 gram vaginally three times a week at bedtime with applicator. 1 Tube 11    amitriptyline (ELAVIL) 25 MG tablet Take 50 mg by mouth nightly       amLODIPine (NORVASC) 2.5 MG tablet Take 10 mg by mouth daily       nitrofurantoin (MACRODANTIN) 50 MG capsule Take 50 mg by mouth 4 times daily. Potassium 99 MG TABS Take  by mouth. simvastatin (ZOCOR) 20 MG tablet Take 20 mg by mouth nightly. Allergies: Allergies   Allergen Reactions    Other      Pollen       SocHx:  Social History     Socioeconomic History    Marital status:    Tobacco Use    Smoking status: Former    Smokeless tobacco: Never   Substance and Sexual Activity    Alcohol use: Yes     Alcohol/week: 0.0 standard drinks     Comment: occ    Drug use: No        FHx:      Problem Relation Age of Onset    Hypertension Mother     Obesity Mother     Alzheimer's Disease Father          Objective     Vital Signs:  Patient Vitals for the past 8 hrs:   BP Pulse Resp   12/31/22 0615 (!) 144/63 60 22       Physical Exam:    Physical Exam  Constitutional:       Appearance: She is obese. HENT:      Head: Normocephalic. Comments: Traumatic Laceration     Mouth/Throat:      Mouth: Mucous membranes are moist.   Eyes:      Extraocular Movements: Extraocular movements intact. Pupils: Pupils are equal, round, and reactive to light. Cardiovascular:      Rate and Rhythm: Normal rate and regular rhythm. Heart sounds: No murmur heard. Pulmonary:      Effort: Pulmonary effort is normal. No respiratory distress. Breath sounds: No wheezing. Abdominal:      General: Bowel sounds are normal. There is no distension. Palpations: Abdomen is soft. Musculoskeletal:         General: Swelling and tenderness present. Normal range of motion. Right lower leg: Edema present. Left lower leg: Edema present.    Skin: Findings: Bruising and lesion present. Neurological:      General: No focal deficit present. Mental Status: She is alert. Mental status is at baseline. She is disoriented. Cranial Nerves: No cranial nerve deficit. Motor: No weakness. Psychiatric:         Mood and Affect: Mood normal.          Labs:  CBC:   Recent Labs     12/30/22 2017   WBC 3.7*   HGB 9.2*   HCT 29.0*          BMP:   Recent Labs     12/30/22 2017      K 3.9   CL 99   CO2 30   BUN 17   CREATININE 1.7*   GLUCOSE 108*     Magnesium: No results for input(s): MG in the last 72 hours. LFT's:   Recent Labs     12/30/22 2017   AST 21   ALT 11   BILITOT 0.5   ALKPHOS 149*     INR:   Recent Labs     12/30/22 2017   INR 1.06     COVID-19:   Recent Labs     12/30/22 2124   [place] Not Detected       Radiology:     CT Head wo contrast 12/30/22  1. 9 mm hemorrhagic contusion in the left frontal lobe and multifocal left  subarachnoid hemorrhage with hemorrhage located medially and laterally in the  frontal lobe region and in the left parietal region. 2. High attenuation along the course of the right middle and anterior  cerebral arteries which could represent subarachnoid hemorrhage in this  region. Thrombosis of these arteries could have a similar appearance. CT  angiography could be considered for further evaluation. 3. Mucosal thickening in the left maxillary sinus. CT Neck 12/30/22  There there are 5 mm anterior communicating artery and basilar tip aneurysms.         Medications:     Current Facility-Administered Medications on File Prior to Encounter   Medication Dose Route Frequency Provider Last Rate Last Admin    acetaminophen (TYLENOL) tablet 650 mg  650 mg Oral Q4H PRN Vivien Ceballos MD         Current Outpatient Medications on File Prior to Encounter   Medication Sig Dispense Refill    furosemide (LASIX) 20 MG tablet Take 20 mg by mouth 2 times daily      Budesonide-Formoterol Fumarate (SYMBICORT IN) Inhale 2 Inhalers into the lungs as needed      bacitracin 500 UNIT/GM ophthalmic ointment every 4 hours Every 4 hours. captopril-hydrochlorothiazide (CAPOZIDE) 50-25 MG per tablet Take 1 tablet by mouth daily      conjugated estrogens (PREMARIN) 0.625 MG/GM vaginal cream Place . 5 gram vaginally three times a week at bedtime with applicator. 1 Tube 11    amitriptyline (ELAVIL) 25 MG tablet Take 50 mg by mouth nightly       amLODIPine (NORVASC) 2.5 MG tablet Take 10 mg by mouth daily       nitrofurantoin (MACRODANTIN) 50 MG capsule Take 50 mg by mouth 4 times daily. Potassium 99 MG TABS Take  by mouth. simvastatin (ZOCOR) 20 MG tablet Take 20 mg by mouth nightly. Assessment & Plan   Harvest Moritz is a 81 y.o. female with PMHx of A fib (watchman device), Asthma, Venous insufficiency, HTN, multi falls past couple of months (3 in last week) and Heart failure who presented to the emergency department of [address] [place] [address],[address] with fall. CT head showed IPH and anterior communicating artery and basilar tip aneurysms. 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 Likely prerenal  Cret is elevated to 1.7 with baseline of (1.17-1.4)  -Urine Sodium  -Urine Cret  -Monitor BMP  -Hold home Lasix    Cardiovascular  Elevated Trops  Patient report CKD which is likely the cause of elevated trops. EKG unremarkable  -Trend Trops to rule out NSTEMI      Chronic Medical Conditions  HTN and Heart failure:  On home Furosemide, Amlodipine, Captopril-HCTZ  -Hold Meds  COPD: On home Symbicort  -Continue Symbicort    A Fib: Have watchman device  -Continue to Monitor    DVT PPx: SCDs  Diet: No diet orders on file   Code status:  Full Code   ELOS: 3 Days  Barriers to discharge: Neurological Status  Disposition  - Preadmission: Home  - Current: ICU  - Upon discharge: Home    Will discuss with attending physician Dr. Shant Joel  ________________________  Halle Child MD,   PGY-1, Internal Medicine  12/31/22  6:25 AM

## 2022-12-31 NOTE — ED TRIAGE NOTES
Patient fell down does not remember how she fell down or if she hit her head. Patient has bruising on the left side of thr fore head and bleeding.

## 2022-12-31 NOTE — PLAN OF CARE
Problem: Discharge Planning  Goal: Discharge to home or other facility with appropriate resources  Outcome: Progressing  Flowsheets (Taken 12/31/2022 0800)  Discharge to home or other facility with appropriate resources: Identify barriers to discharge with patient and caregiver     Problem: Pain  Goal: Verbalizes/displays adequate comfort level or baseline comfort level  Outcome: Progressing  Flowsheets (Taken 12/31/2022 0800)  Verbalizes/displays adequate comfort level or baseline comfort level: Encourage patient to monitor pain and request assistance     Problem: Safety - Adult  Goal: Free from fall injury  Outcome: Progressing  Flowsheets (Taken 12/31/2022 1551)  Free From Fall Injury: Instruct family/caregiver on patient safety     Problem: ABCDS Injury Assessment  Goal: Absence of physical injury  Outcome: Progressing  Flowsheets (Taken 12/31/2022 1551)  Absence of Physical Injury: Implement safety measures based on patient assessment     Problem: Skin/Tissue Integrity  Goal: Absence of new skin breakdown  Description: 1. Monitor for areas of redness and/or skin breakdown  2. Assess vascular access sites hourly  3. Every 4-6 hours minimum:  Change oxygen saturation probe site  4. Every 4-6 hours:  If on nasal continuous positive airway pressure, respiratory therapy assess nares and determine need for appliance change or resting period.   Outcome: Progressing

## 2022-12-31 NOTE — CONSULTS
ICU Consult Note    Admit Date: 12/31/2022  Day: 0  Vent Day: None  IV Access:Peripheral  IV Fluids:None  Vasopressors:None                Antibiotics: None  Diet: Diet NPO    CC: Fall    Interval history: Patient was seen this morning while resting in bed. Patient is very pleasant and has complaints of only a headache at the moment. She has mild bilateral leg swelling and a laceration on her forehead from her fall. She has no complaints other than the pain in her head. HPI: Ariel Meehan is a 81 y.o. female with PMHx of A fib (watchman device), COPD (Home oxygen), Venous insufficiency, HTN, multiple AVMs (capsule endoscopy)  multiple falls past couple of months (3 in last week), Obstructive sleep apnea hypopnea, CKD Stage 3 and Heart failure who presented to the emergency department of [facility] with fall. As per patient she was lifting a heavy grocery and putting in the car afterwards she does not remember anything until she was sitting in the car with help her. She was informed that she fell and hit her head on the pole of handicap sign. Patient also complained of headache with bleeding laceration and also pain in right leg with laceration. Patient also reported that she has started seeing cardiologist for heart failure, and nephrologist for kidney injury after one fall. In the ED of [facility] patient was found oriented x 4 with  scalp contusion/hematoma noted and laceration on right leg. She was vitally stable. Labs were remarkable for elevated creatinine 1.7 (may be chronic), and elevated trops (0.02). 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 Neurosurgery was consulted and patient was transferred to [facility] ICU     On my interview here at [facility] [employer] patient was oriented x3 with slight distress due to head trauma. She complains of headache and leg pain.  However, she denies any vision changes, chest pain, chest tightness, light headedness, abd pain, change in urinary or bowel habits, or any unintended weight change. Medications:     Scheduled Meds:   levETIRAcetam  500 mg IntraVENous Q12H    budesonide  0.5 mg Nebulization BID    And    Arformoterol Tartrate  15 mcg Nebulization BID     Continuous Infusions:   niCARdipine       PRN Meds:ipratropium-albuterol    Objective:   Vitals:   T-max:  Patient Vitals for the past 8 hrs:   BP Temp Temp src Pulse Resp SpO2 Weight   12/31/22 0900 -- -- -- 64 17 -- --   12/31/22 0800 (!) 140/49 -- -- 59 15 98 % --   12/31/22 0700 106/89 -- -- 59 22 -- --   12/31/22 0615 (!) 144/63 98.2 °F (36.8 °C) Oral 60 22 -- 174 lb 2.6 oz (79 kg)     No intake or output data in the 24 hours ending 12/31/22 0925    Review of Systems   Constitutional:  Negative for chills and fatigue. HENT:          Head pain where laceration is. Gastrointestinal:  Negative for abdominal pain. Physical Exam  Constitutional:       Appearance: She is obese. HENT:      Head: Normocephalic. Comments: Head laceration on forehead     Right Ear: External ear normal.      Left Ear: External ear normal.      Nose: Nose normal.      Mouth/Throat:      Mouth: Mucous membranes are dry. Eyes:      Extraocular Movements: Extraocular movements intact. Cardiovascular:      Rate and Rhythm: Normal rate. Heart sounds: Normal heart sounds. Pulmonary:      Effort: Pulmonary effort is normal.      Breath sounds: Normal breath sounds. Neurological:      General: No focal deficit present. Mental Status: She is alert and oriented to person, place, and time.    Psychiatric:         Mood and Affect: Mood normal.         Behavior: Behavior normal.         LABS:    CBC:   Recent Labs     12/30/22 2017 12/31/22  0745   WBC 3.7* 4.0   HGB 9.2* 8.2*   HCT 29.0* 25.5*    162   MCV 90.7 90.9     Renal:    Recent Labs     12/30/22 2017 12/31/22  0745    137   K 3.9 4.0   CL 99 102   CO2 30 26   BUN 17 14   CREATININE 1.7* 1.3*   GLUCOSE 108* 83   CALCIUM 9.1 7.9*   ANIONGAP 9 9     Hepatic:   Recent Labs     12/30/22 2017   AST 21   ALT 11   BILITOT 0.5   PROT 6.3*   LABALBU 3.4   ALKPHOS 149*     Troponin:   Recent Labs     12/30/22 2017 12/31/22  0745   TROPONINI 0.02* 0.01     BNP: No results for input(s): BNP in the last 72 hours. Lipids: No results for input(s): CHOL, HDL in the last 72 hours. Invalid input(s): LDLCALCU, TRIGLYCERIDE  ABGs:  No results for input(s): PHART, BXT6OFR, PO2ART, VJR6PCX, BEART, THGBART, A5XLBPVZ, KCT6SSC in the last 72 hours. INR:   Recent Labs     12/30/22 2017   INR 1.06     Lactate: No results for input(s): LACTATE in the last 72 hours. Cultures:  -----------------------------------------------------------------  RAD:   CT HEAD WO CONTRAST    (Results Pending)         Assessment/Plan:   Rianna Bennett is a 81 y.o. female with PMHx of A fib (watchman device), Asthma, Venous insufficiency, HTN, multi falls past couple of months (3 in last week) and Heart failure who presented to the emergency department of [address] [place] [address],[address] with fall. CT head showed IPH and anterior communicating artery and basilar tip aneurysms. 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 Likely prerenal  Cret is elevated to 1.7 with baseline of (1.17-1.4)  -Urine Sodium  -Urine Cret  -Monitor BMP  -Hold home Lasix     Cardiovascular  Elevated Trops  Patient report CKD which is likely the cause of elevated trops.  EKG unremarkable  -Stopped trending Trops to rule out NSTEMI due to normal Troponin levels        Chronic Medical Conditions  HTN and Heart failure: On home Furosemide, Amlodipine, Captopril-HCTZ  -Hold Meds  COPD: On home Symbicort  -Continue Symbicort     A Fib: Have watchman device  -Continue to Monitor     DVT PPx: SCDs  Diet: No diet orders on file   Code status:  Full Code   ELOS: 3 Days  Barriers to discharge: Neurological Status  Disposition  - Preadmission: Home  - Current: ICU  - Upon discharge: Inna Gifford MD, PGY-1  12/31/22  9:25 AM    This patient has been staffed and discussed with Katherine Reagan MD     Pulmonary & Critical Care    Patient seen and examined. I agree with Dr. Otis Weber history, physical, lab findings, assessment and plan and edited as needed.     Rosalio Gallagher MD

## 2022-12-31 NOTE — PROGRESS NOTES
Continuing hourly neuro checks, no changed noted. Pt expresses feeling some pain in back of legs, legs elevated with pillow and bed.

## 2022-12-31 NOTE — PROGRESS NOTES
Speech Language Pathology    Orders received and chart reviewed. Noted pt currently npo, awaiting neurosurgery consult. Will f/u at later time as able/appropriate. Dasha Barnes, [id number] [place] [id number]  Pg.  # [id number]

## 2022-12-31 NOTE — CONSULTS
Clinical Pharmacy Progress Note  Medication History      Asked to verify home medications by Dr. Osmani Cleveland. List of of current medications patient is taking is complete. Home Medication list in EPIC updated to reflect changes noted below. Source of information: patient interview, [facility] pharmacy fills, outpt notes  Pt is very knowledgeable about her medications     Changes made to home medication list:   Medications removed (no longer taking):  Amlodipine  Bacitracin eye oint  Symbicort  Captopril / HCTZ  Premarin vaginal cream  Furosemide   Nitrofurantoin  Potassium     Medications added:   Estradiol vaginal cream (but does not use regularly - just prn)  Allopurinol  Amiodarone  Vit D  Levothyroxine  Montelukast  Torsemide  Aspirin     Medication doses / instructions adjusted:   None     Other notes: Torsemide - pt states cardiology ordered 20mg daily  (which is what prescription is written for), but nephrologist told her to cut tablets in half - takes just 10mg daily  Potassium - states she is supposed to take this, but doesn't because the pills are so big. States potassium is ok without it. Did not add to med list.  Anticoagulants / antiplatelets - pt was previously on Apixaban (last filled June 2022) and Clopidogrel (last filled Nov 2022). Pt states cardiology took her off both of these due to bleeding. Just on baby aspirin now. Appears from cardiology notes that pt now has Watchman. Inhalers - states that she had Dulera and Duoneb filled after she had RSV last month - states she no longer takes these.      Please call with questions--  Thanks--  Guzman Cunningham, PharmD, BCPS, BCGP  [id number] ([facility])   12/31/2022 3:38 PM      Current Outpatient Medications   Medication Instructions    allopurinol (ZYLOPRIM) 100 mg, Oral, DAILY    amiodarone (CORDARONE) 200 mg, Oral, DAILY    amitriptyline (ELAVIL) 50 mg, Oral, NIGHTLY    aspirin 81 mg, Oral, DAILY    estradiol (ESTRACE) 0.5 g, Vaginal, SEE ADMIN INSTRUCTIONS, Twice weekly as needed    levothyroxine (SYNTHROID) 75 mcg, Oral, DAILY    montelukast (SINGULAIR) 10 mg, Oral, DAILY    simvastatin (ZOCOR) 20 mg, Oral, NIGHTLY    torsemide (DEMADEX) 10 mg, Oral, DAILY    vitamin D 4,000 Units, Oral, DAILY

## 2022-12-31 NOTE — ED PROVIDER NOTES
[address] [facility]      Pt Name: Yvonne Lopez  MRN: [id number]  HUGRCBAET 1935  Date of evaluation: 12/30/2022  Provider: SOFIA Toro    This patient was seen and evaluated by the attending physician Maira Del Rio MD.    [address] [place] [address]       Chief Complaint   Patient presents with    Fall       CRITICAL CARE TIME   I performed a total Critical Care time of  35 minutes, excluding separately reportable procedures. There was a high probability of clinically significant/life threatening deterioration in the patient's condition which required my urgent intervention. Not limited to multiple reexaminations, discussions with attending physician and consultants. HISTORY OF PRESENT ILLNESS  (Location/Symptom, Timing/Onset, Context/Setting, Quality, Duration, Modifying Factors, Severity.)   Yvonne Lopez is a 81 y.o. female who presents to the emergency department via EMS after a fall. She has contusion and laceration to the left side of her forehead. She tells me she does not think she lost consciousness but also does not remember hitting her head. She tells me she had been at the grocery store and walking around the side of her car after loading of her groceries when she fell. She tells me she is been losing her balance but that this seemed different because she cannot recall losing her balance. She tells me she been losing her balance for some weeks was seen in the emergency department couple weeks ago with head injury after losing her balance but tells me she fell 3 times last week because of losing her balance. She does me she is supposed to do balance \"therapy. \"  But has not yet scheduled it. She has history of frequent UTIs, hyperlipidemia and hypertension.   She tells me she was on blood thinners with a history of A. fib but has not been for the past month after having watchman procedure and her blood thinners being discontinued by her heart doctor. She denies numbness or weakness. Complains of some pain on the left side of her chest unsure if she struck the chest.    MDM: Patient apparently has been having frequent falls. She lives at home alone. She fell walking around the side of her car today unclear exactly how or why. She has a large contusion to her forehead as well as a laceration which was repaired with 2 sutures. She has an abnormal CT scan of her head. We initially did a CTA because of the findings. The attending physician spoke with neurosurgery Dr. Maury Singh. I spoke with the hospitalist Dr. José Miguel Wooten at [facility]  She will be transferred she is updated and agreeable. Nursing Notes were reviewed and I agree. REVIEW OF SYSTEMS    (2-9 systems for level 4, 10 or more for level 5)     Review of Systems   Constitutional:  Negative for fever. Respiratory:  Negative for shortness of breath. Cardiovascular:  Positive for chest pain. Gastrointestinal:  Negative for abdominal pain and vomiting. Musculoskeletal:  Negative for neck pain and neck stiffness. Skin:  Positive for wound. Negative for color change. Neurological:  Positive for headaches. Negative for weakness and numbness. Psychiatric/Behavioral:  Negative for agitation, behavioral problems and confusion. Except as noted above the remainder of the review of systems was reviewed and negative.        PAST MEDICAL HISTORY         Diagnosis Date    Asthma     Cancer ([address]     skin cancer basal and squamous    History of blood transfusion     Hyperlipidemia     Hypertension     Leg swelling     Osteoarthritis     UTI symptoms     Venous insufficiency of leg        SURGICAL HISTORY           Procedure Laterality Date    APPENDECTOMY  1969    CAPSULE ENDOSCOPY N/A 4/1/2022    CAPSULE ENDOSCOPY performed by Sade Craft MD at [facility] REPLACEMENT Bilateral     bilateral TKR    NASAL SEPTUM SURGERY      [place] DILATION/CURETTAGE,DIAGNOSTIC      D & C    AZ TOTAL KNEE ARTHROPLASTY Right 2000    knee surgery    [place] VAGINAL HYSTERECTOMY,UTERUS 250 GMS/<  2004    Hysterectomy    TONSILLECTOMY      VEIN SURGERY      legs       CURRENT MEDICATIONS       Previous Medications    AMITRIPTYLINE (ELAVIL) 25 MG TABLET    Take 50 mg by mouth nightly     AMLODIPINE (NORVASC) 2.5 MG TABLET    Take 10 mg by mouth daily     BACITRACIN 500 UNIT/GM OPHTHALMIC OINTMENT    every 4 hours Every 4 hours. BUDESONIDE-FORMOTEROL FUMARATE (SYMBICORT IN)    Inhale 2 Inhalers into the lungs as needed    CAPTOPRIL-HYDROCHLOROTHIAZIDE (CAPOZIDE) 50-25 MG PER TABLET    Take 1 tablet by mouth daily    CONJUGATED ESTROGENS (PREMARIN) 0.625 MG/GM VAGINAL CREAM    Place . 5 gram vaginally three times a week at bedtime with applicator. FUROSEMIDE (LASIX) 20 MG TABLET    Take 20 mg by mouth 2 times daily    NITROFURANTOIN (MACRODANTIN) 50 MG CAPSULE    Take 50 mg by mouth 4 times daily. POTASSIUM 99 MG TABS    Take  by mouth. SIMVASTATIN (ZOCOR) 20 MG TABLET    Take 20 mg by mouth nightly. ALLERGIES     Other    FAMILY HISTORY           Problem Relation Age of Onset    Hypertension Mother     Obesity Mother     Alzheimer's Disease Father      Family Status   Relation Name Status    Mother      Father          SOCIAL HISTORY      reports that she has quit smoking. She has never used smokeless tobacco. She reports current alcohol use. She reports that she does not use drugs. PHYSICAL EXAM    (up to 7 for level 4, 8 or more for level 5)     ED Triage Vitals [22]   BP Temp Temp Source Heart Rate Resp SpO2 Height Weight   (!) 140/43 97.3 °F (36.3 °C) Oral 57 18 100 % -- 178 lb 12.7 oz (81.1 kg)       Physical Exam  Vitals and nursing note reviewed. HENT:      Head: Contusion and laceration present.         Mouth/Throat:      Mouth: Mucous membranes are moist.   Eyes:      Pupils: Pupils are equal, round, and reactive to light. Cardiovascular:      Rate and Rhythm: Normal rate. Pulmonary:      Effort: Pulmonary effort is normal.   Chest:      Chest wall: Tenderness present. Abdominal:      Tenderness: There is no abdominal tenderness. There is no guarding or rebound. Musculoskeletal:         General: No tenderness. Cervical back: Normal range of motion. Skin:     General: Skin is warm. Neurological:      General: No focal deficit present. Mental Status: She is alert and oriented to person, place, and time. Sensory: No sensory deficit. Motor: No weakness. Psychiatric:         Mood and Affect: Mood normal.         Behavior: Behavior normal.       DIAGNOSTIC RESULTS     EKG: All EKG's are interpreted by SOFIA Padilla in the absence of a cardiologist.    EKG interpreted by myself - please refer to attending physician's note for complete EKG interpretation:  No evidence of acute ischemia or injury. RADIOLOGY:   Non-plain film images such as CT, Ultrasound and MRI are read by the radiologist. Plain radiographic images are visualized and preliminarily interpreted by SOFIA Padilla with the below findings:    Reviewed radiologist's interpretation. Interpretation per the Radiologist below, if available at the time of this note:    CTA HEAD NECK W CONTRAST   Final Result   No flow-limiting arterial stenosis in the head and neck. 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 Follow-up catheter angiogram is recommended for complete assessment. CT CERVICAL SPINE WO CONTRAST   Final Result   Moderate degenerative disc changes throughout the cervical spine with mild   subluxation of C3 on C4 and C7 on T1 which is probably degenerative in   etiology with no obvious fracture seen at these levels.   If there is clinical   concern for ligamentous instability, suggest MRI correlation or flexion-extension views for correlation. Small disc osteophyte complex and mild subluxation at C3-4 causing moderate   dural sac effacement. Moderate disc osteophyte complexes at C4-5, C5-6, and C6-7 causing moderate   dural sac effacement throughout      Questionable subchondral cyst or erosion along the odontoid process   posteriorly which could be due to rheumatoid disease         CT HEAD WO CONTRAST   Final Result   1. 9 mm hemorrhagic contusion in the left frontal lobe and multifocal left   subarachnoid hemorrhage with hemorrhage located medially and laterally in the   frontal lobe region and in the left parietal region. 2. High attenuation along the course of the right middle and anterior   cerebral arteries which could represent subarachnoid hemorrhage in this   region. Thrombosis of these arteries could have a similar appearance. CT   angiography could be considered for further evaluation. 3. Mucosal thickening in the left maxillary sinus. Critical results were called by Dr. Carl Bird to [facility] BLACK on   12/30/2022 at 9:45 p.m. [facility] XR CHEST PORTABLE   Final Result   1. Cardiomegaly without evidence of CHF. 2. No confluent airspace consolidation.                LABS:  Labs Reviewed   CBC WITH AUTO DIFFERENTIAL - Abnormal; Notable for the following components:       Result Value    WBC 3.7 (*)     RBC 3.20 (*)     Hemoglobin 9.2 (*)     Hematocrit 29.0 (*)     RDW 16.8 (*)     All other components within normal limits   COMPREHENSIVE METABOLIC PANEL - Abnormal; Notable for the following components:    Glucose 108 (*)     Creatinine 1.7 (*)     Est, Glom Filt Rate 29 (*)     Total Protein 6.3 (*)     Alkaline Phosphatase 149 (*)     All other components within normal limits   TROPONIN - Abnormal; Notable for the following components:    Troponin 0.02 (*)     All other components within normal limits   URINALYSIS WITH REFLEX TO CULTURE - Abnormal; Notable for the following components:    Urobilinogen, Urine 2.0 (*)     All other components within normal limits   APTT - Abnormal; Notable for the following components:    aPTT 35.4 (*)     All other components within normal limits   COVID-19, RAPID   PROTIME-INR       All other labs were within normal range or not returned as of this dictation. EMERGENCY DEPARTMENT COURSE and DIFFERENTIAL DIAGNOSIS/MDM:   Vitals:    Vitals:    12/30/22 2005 12/30/22 2200 12/31/22 0215   BP: (!) 140/43 (!) 150/52 (!) 113/46   Pulse: 57  63   Resp: 18  14   Temp: 97.3 °F (36.3 °C)     TempSrc: Oral     SpO2: 100%  99%   Weight: 178 lb 12.7 oz (81.1 kg)         CONSULTS:  IP CONSULT TO NEUROSURGERY    PROCEDURES:  Lac Repair    Date/Time: 12/31/2022 2:47 AM  Performed by: SOFIA Euceda  Authorized by: Jas Pang MD     Anesthesia:     Anesthesia method:  Local infiltration    Local anesthetic:  Lidocaine 1% w/o epi  Laceration details:     Location:  Face    Face location:  L eyebrow    Length (cm):  2  Pre-procedure details:     Preparation:  Patient was prepped and draped in usual sterile fashion  Treatment:     Area cleansed with:  Afshan-Mireya    Amount of cleaning:  Standard    Irrigation solution:  Sterile saline    Irrigation method:  Syringe  Skin repair:     Repair method:  Sutures    Suture size:  5-0    Suture material:  Nylon    Suture technique:  Simple interrupted    Number of sutures:  2  Approximation:     Approximation:  Loose  Post-procedure details:     Dressing:  Bulky dressing    Procedure completion:  Tolerated      FINAL IMPRESSION      1. Subarachnoid hematoma, with unknown loss of consciousness status, initial encounter    2. Focal hemorrhagic contusion of cerebrum    3. KYARA (acute kidney injury) ([facility] [address]    4. Elevated troponin          DISPOSITION/PLAN   DISPOSITION Decision To Transfer 12/30/2022 11:22:58 PM      PATIENT REFERRED TO:  No follow-up provider specified.     DISCHARGE MEDICATIONS:  New Prescriptions    No medications on file       (Please note that portions of this note were completed with a voice recognition program.  Efforts were made to edit the dictations but occasionally words are mis-transcribed.)    Jillian Sotomayor [place]  12/31/22 2120

## 2022-12-31 NOTE — RT PROTOCOL NOTE
RT Nebulizer Bronchodilator Protocol Note    There is a bronchodilator order in the chart from a provider indicating to follow the RT Bronchodilator Protocol and there is an Initiate RT Bronchodilator Protocol order as well (see protocol at bottom of note). CXR Findings:  XR CHEST PORTABLE    Result Date: 12/30/2022  1. Cardiomegaly without evidence of CHF. 2. No confluent airspace consolidation. The findings from the last RT Protocol Assessment were as follows:  Smoking: Chronic pulmonary disease  Respiratory Pattern: Regular pattern and RR 12-20 bpm  Breath Sounds: Slightly diminished and/or crackles  Cough: Strong, spontaneous, non-productive  Indication for Bronchodilator Therapy: Decreased or absent breath sounds, On home bronchodilators  Bronchodilator Assessment Score: 4    Aerosolized bronchodilator medication orders have been revised according to the RT Nebulizer Bronchodilator Protocol below. Respiratory Therapist to perform RT Therapy Protocol Assessment initially then follow the protocol. Repeat RT Therapy Protocol Assessment PRN for score 0-3 or on second treatment, BID, and PRN for scores above 3. No Indications - adjust the frequency to every 6 hours PRN wheezing or bronchospasm, if no treatments needed after 48 hours then discontinue using Per Protocol order mode. If indication present, adjust the RT bronchodilator orders based on the Bronchodilator Assessment Score as indicated below. If a patient is on this medication at home then do not decrease Frequency below that used at home. 0-3 - enter or revise RT bronchodilator order(s) to equivalent RT Bronchodilator order with Frequency of every 4 hours PRN for wheezing or increased work of breathing using Per Protocol order mode.        4-6 - enter or revise RT Bronchodilator order(s) to two equivalent RT bronchodilator orders with one order with BID Frequency and one order with Frequency of every 4 hours PRN wheezing or increased work of breathing using Per Protocol order mode. 7-10 - enter or revise RT Bronchodilator order(s) to two equivalent RT bronchodilator orders with one order with TID Frequency and one order with Frequency of every 4 hours PRN wheezing or increased work of breathing using Per Protocol order mode. 11-13 - enter or revise RT Bronchodilator order(s) to one equivalent RT bronchodilator order with QID Frequency and an Albuterol order with Frequency of every 4 hours PRN wheezing or increased work of breathing using Per Protocol order mode. Greater than 13 - enter or revise RT Bronchodilator order(s) to one equivalent RT bronchodilator order with every 4 hours Frequency and an Albuterol order with Frequency of every 2 hours PRN wheezing or increased work of breathing using Per Protocol order mode. RT to enter RT Home Evaluation for COPD & MDI Assessment order using Per Protocol order mode.     Electronically signed by Bibi Be RCP on 12/31/2022 at 10:28 AM

## 2022-12-31 NOTE — PROGRESS NOTES
Physical Therapy/Occupational Therapy  No treatment  Chart reviewed for PT/OT evaluation. Patient currently on strict bedrest and awaiting neurosurgery consult. Will hold evaluations at this time until patient has been cleared for mobility.     [place], [place] [id number]  Francisco Nelson  MS, OTR/L #[id number]

## 2022-12-31 NOTE — CONSULTS
Neurology / Minta Boots Consult Note    Dean Ochoa MD is requesting this consult. Reason for Consult: [address]  Admission Chief Complaint: fall    History of Present Illness     Baylee Cuellar is a 81 y.o. y/o female with PMH significant for HLD, HTN, lymphedema (BLE), frequent falls, a-fib, and osteoarthritis. Per my interview with the patient she was just finishing up putting her groceries in her car when she fell, lost consciousness, and was awakened by people in the parking lot calling EMS. Patient states she only takes a low dose ASA at home. Patient was taken to [facility] ED. CT head showed a 9mm hemrorhagic contusion in her L frontal lobe, and multifocal SAH in the L frontal L parietal lobes. 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 Multiple abrasions and lacerations are noted on the patient's head and leg in the ED. Patient transferred to [facility] ICU fur further evaluation and treatment. Of note: patient has been having frequent falls lately. Patient states she just loses her balance and will fall with no feelings of lightheadedness, dizziness, or weakness. REVIEW OF SYSTEMS:   Constitutional- No weight loss or fevers   Eyes- No diplopia. No photophobia. Ears/nose/throat- No dysphagia. No Dysarthria   Cardiovascular- No palpitations. No chest pain   Respiratory- No dyspnea. No Cough   Gastrointestinal- No Abdominal pain. No Vomiting. Genitourinary- No incontinence. No urinary retention   Musculoskeletal- No myalgia. No arthralgia   Skin- No rash. No easy bruising. Psychiatric- No depression. No anxiety   Endocrine- No diabetes. No thyroid issues. Hematologic- No bleeding difficulty. No fatigue   Neurologic- No visual deficits. No acute, focal motor/sensory deficits. C/o mild headache, and baseline neuropathy to BLE.      Past Medical, Surgical, Family, and Social History   PAST MEDICAL HISTORY:  Past Medical History:   Diagnosis Date    Asthma     Cancer ([facility] [address]     skin cancer basal and squamous    History of blood transfusion     Hyperlipidemia     Hypertension     Leg swelling     Osteoarthritis     UTI symptoms     Venous insufficiency of leg      SURGICAL HISTORY:  Past Surgical History:   Procedure Laterality Date    APPENDECTOMY  1969    CAPSULE ENDOSCOPY N/A 4/1/2022    CAPSULE ENDOSCOPY performed by Claudia Cantu MD at [address] [place] [address]      JOINT REPLACEMENT Bilateral     bilateral TKR    NASAL SEPTUM SURGERY      KS DILATION/CURETTAGE,DIAGNOSTIC      D & C    KS TOTAL KNEE ARTHROPLASTY Right 2000    knee surgery    KS VAGINAL HYSTERECTOMY,UTERUS 250 GMS/<  2004    Hysterectomy    TONSILLECTOMY      VEIN SURGERY      legs     FAMILY HISTORY & SOCIAL HISTORY:  Family history non-contributory  Family History   Problem Relation Age of Onset    Hypertension Mother     Obesity Mother     Alzheimer's Disease Father      Social History     Tobacco Use    Smoking status: Former    Smokeless tobacco: Never   Substance Use Topics    Alcohol use: Yes     Alcohol/week: 0.0 standard drinks     Comment: occ    Drug use: No         Allergies & Outpatient Medications   ALLERGIES:  Allergies   Allergen Reactions    Other      Pollen     HOME MEDICATIONS:  Current Discharge Medication List        CONTINUE these medications which have NOT CHANGED    Details   furosemide (LASIX) 20 MG tablet Take 20 mg by mouth 2 times daily      Budesonide-Formoterol Fumarate (SYMBICORT IN) Inhale 2 Inhalers into the lungs as needed      bacitracin 500 UNIT/GM ophthalmic ointment every 4 hours Every 4 hours. captopril-hydrochlorothiazide (CAPOZIDE) 50-25 MG per tablet Take 1 tablet by mouth daily      conjugated estrogens (PREMARIN) 0.625 MG/GM vaginal cream Place . 5 gram vaginally three times a week at bedtime with applicator.   Qty: 1 Tube, Refills: 11      amitriptyline (ELAVIL) 25 MG tablet Take 50 mg by mouth nightly       amLODIPine (NORVASC) 2.5 MG tablet Take 10 mg by mouth daily       nitrofurantoin (MACRODANTIN) 50 MG capsule Take 50 mg by mouth 4 times daily. Potassium 99 MG TABS Take  by mouth. simvastatin (ZOCOR) 20 MG tablet Take 20 mg by mouth nightly. Physical Exam   PHYSICAL EXAM:  Vitals:    12/31/22 0615   BP: (!) 144/63   Pulse: 60   Resp: 22   Temp: 98.2 °F (36.8 °C)   TempSrc: Oral   Weight: 174 lb 2.6 oz (79 kg)         General: Alert, no distress, well-nourished  Neurologic  Mental status:   orientation to person, place, time, situation   Attention intact as able to attend well to the exam     Language fluent in conversation   Comprehension intact; follows simple commands    Cranial nerves:   CN2: Visual fields full w/o extinction on confrontational testing   CN 3,4,6: Pupils equal and reactive to light, extraocular muscles intact  CN5: Facial sensation symmetric   CN7: Face symmetric  CN8: Hearing symmetric to spoken voice  CN9: Palate elevated symmetrically  CN11: Traps full strength on shoulder shrug  CN12: Tongue midline with protrusion    Motor Exam:   R  L    Deltoid 5  5   Biceps 5 5   Triceps 5 5   Wrist extension  5 5   Interossei 5 5      R  L    Hip flexion  5  5   Hip extension  5 5   Knee flexion  5 5   Knee extension  5 5   Ankle dorsiflexion  5 5   Ankle plantar flexion  5 5     Deep tendon reflexes:    R  L    Biceps  2  2   Brachioradialis  2 2   Patellar  2 2     Sensory: light touch intact and symmetric in all 4 extremities. No sensory extinction on bilateral simultaneous stimulation, baseline BLE decreased sensation 2/2 neuropathy and lymphedema.   Cerebellar/coordination: finger nose finger normal without ataxia  Tone: normal in all 4 extremities  Gait: held 2/2 patient safety      OTHER SYSTEMS:  Cardiovascular: Warm, appears well perfused   Respiratory: Easy, non-labored respiratory pattern   Abdominal: Abdomen is without distention   Extremities: Upper and lower extremities are atraumatic in appearance without deformity. No swelling or erythema. Psychiatric: Cooperative with exam    Diagnostic Testing Results   IMAGES:  Images personally reviewed and agree w/ radiology interpretation. Head CT w/o Contrast:  Impression   1. 9 mm hemorrhagic contusion in the left frontal lobe and multifocal left   subarachnoid hemorrhage with hemorrhage located medially and laterally in the   frontal lobe region and in the left parietal region. 2. High attenuation along the course of the right middle and anterior   cerebral arteries which could represent subarachnoid hemorrhage in this   region. Thrombosis of these arteries could have a similar appearance. CT   angiography could be considered for further evaluation. 3. Mucosal thickening in the left maxillary sinus. CTA of Head / Neck w/ Contrast:  Impression   No flow-limiting arterial stenosis in the head and neck. 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 Follow-up catheter angiogram is recommended for complete assessment. CT C spine w/o Contrast:  Impression   Moderate degenerative disc changes throughout the cervical spine with mild   subluxation of C3 on C4 and C7 on T1 which is probably degenerative in   etiology with no obvious fracture seen at these levels. If there is clinical   concern for ligamentous instability, suggest MRI correlation or   flexion-extension views for correlation. Small disc osteophyte complex and mild subluxation at C3-4 causing moderate   dural sac effacement.        Moderate disc osteophyte complexes at C4-5, C5-6, and C6-7 causing moderate   dural sac effacement throughout       Questionable subchondral cyst or erosion along the odontoid process   posteriorly which could be due to rheumatoid disease       LABS:  All results below personally reviewed. Pertinent positives & negatives are addressed in Impression & Recommendations below. LABS   Metabolic Panel Recent Labs     12/30/22 2017      K 3.9   CL 99   CO2 30   BUN 17   CREATININE 1.7*   GLUCOSE 108*   CALCIUM 9.1   LABALBU 3.4   ALKPHOS 149*   ALT 11   AST 21      CBC / Coags Recent Labs     12/30/22 2017   WBC 3.7*   RBC 3.20*   HGB 9.2*   HCT 29.0*      INR 1.06      Other Recent Labs     12/30/22  2124   COVID19 Not Detected     No results for input(s): PHENYTOIN, KEPPRA, LACOSA, LAMO, VALPROATE, LACTSEPSIS, LACTA in the last 72 hours. CURRENT SCHEDULED MEDICATIONS   Inpatient Medications     levETIRAcetam, 500 mg, IntraVENous, Q12H   Infusions      Antibiotics   Recent Abx Admin        No antibiotic orders with administrations found. IMPRESSION & RECOMMENDATIONS     IMPRESSION:  Rayo Craft is an 80 yo female who presents with a traumatic, hemorrhagic contusion and SAH s/p a fall with LOC. On exam, patient is A&Ox4, with no new neurological deficits. CTA also demonstrated a 5mm aneurysm on her ARY, and another 5mm aneurysm on the basilar tip. Both are unruptured      RECOMMENDATIONS:  - repeat head CT  - q1h neuro checks  - NIHSS q shift  - SBP <160mmHg  - Keppra 500mg BID  - PT/OT/SLP for eval/treat  - SCDs for DVT prophylaxis  - NPO, bedside swallow eval before PO intake        NATALIE Talamantes - CNP   Neurology & Neurocritical Care   12/31/2022 6:59 AM      ICU Patients:   Neurocritical Care Line: [phone]  [facility]      Time independently spent by Nurse Practitioner reviewing chart and prior testing, obtaining history from patient, examining patient, ordering appropriate medications / diagnostics, reviewing results of diagnostics, counseling and educating patient / family, communicating plan with other providers and documenting clinical information in the EMR was approximately 40 minutes.

## 2022-12-31 NOTE — CONSULTS
Clinical Pharmacy Progress Note    IVs in NS - Management by Pharmacy    Consult Date(s): 12/31/22  Consulting Provider(s): Dr. Valencia Peralta / Plan  1)  SAH - All IVs in Normal Saline  Drips will be adjusted to normal saline as appropriate based on compatibility, in an effort to avoid fluid shifts, as D5W is osmotically active. The following intermittent IV drips / infusions have been adjusted to saline:  Levetiracetam  Nicardipine  If ordered, the following medications must remain in D5W due to incompatibility with normal saline:  None at this time  Note: Patient may have dextrose ordered as part of hypoglycemia treatment protocol. Total IV fluid delivered to patient over last 24 hrs: TBD  Pharmacist will follow daily to ensure all IVPBs / drips are in NS where possible.     Please call with questions--  Thanks--  Licha Martinez, PharmD, BCPS, BCGP  [id number] ([facility])   12/31/2022 7:10 AM

## 2022-12-31 NOTE — PROGRESS NOTES
Direct admission and transferred from [facility] via stretcher. Transferred to  bed and connected to cardiac monitoring. Vital signs obtained. Orders reviewed and acknowledged. Oriented to room, call light and environment. Questions answered. Bed placed in low position. Call light explained and within reach. Pt is Alert awake and oriented x4, VSS, sinus valentina, RA, complaining headache 5/10. Left side forehead has old bloody drainage, swollen and hematoma. RLE has a skin laceration.

## 2023-01-01 LAB
ANION GAP SERPL CALCULATED.3IONS-SCNC: 14 MMOL/L (ref 3–16)
BUN BLDV-MCNC: 13 MG/DL (ref 7–20)
CALCIUM SERPL-MCNC: 8.9 MG/DL (ref 8.3–10.6)
CHLORIDE BLD-SCNC: 101 MMOL/L (ref 99–110)
CO2: 24 MMOL/L (ref 21–32)
CREAT SERPL-MCNC: 1.3 MG/DL (ref 0.6–1.2)
GFR SERPL CREATININE-BSD FRML MDRD: 40 ML/MIN/{1.73_M2}
GLUCOSE BLD-MCNC: 75 MG/DL (ref 70–99)
HCT VFR BLD CALC: 28.1 % (ref 36–48)
HEMOGLOBIN: 9 G/DL (ref 12–16)
MCH RBC QN AUTO: 29.4 PG (ref 26–34)
MCHC RBC AUTO-ENTMCNC: 32.2 G/DL (ref 31–36)
MCV RBC AUTO: 91.2 FL (ref 80–100)
PDW BLD-RTO: 17.2 % (ref 12.4–15.4)
PLATELET # BLD: 160 K/UL (ref 135–450)
PMV BLD AUTO: 9.5 FL (ref 5–10.5)
POTASSIUM SERPL-SCNC: 4.3 MMOL/L (ref 3.5–5.1)
RBC # BLD: 3.08 M/UL (ref 4–5.2)
SODIUM BLD-SCNC: 139 MMOL/L (ref 136–145)
WBC # BLD: 7.9 K/UL (ref 4–11)

## 2023-01-01 PROCEDURE — 97162 PT EVAL MOD COMPLEX 30 MIN: CPT

## 2023-01-01 PROCEDURE — 97116 GAIT TRAINING THERAPY: CPT

## 2023-01-01 PROCEDURE — 99232 SBSQ HOSP IP/OBS MODERATE 35: CPT | Performed by: INTERNAL MEDICINE

## 2023-01-01 PROCEDURE — 92526 ORAL FUNCTION THERAPY: CPT

## 2023-01-01 PROCEDURE — 1200000000 HC SEMI PRIVATE

## 2023-01-01 PROCEDURE — 85027 COMPLETE CBC AUTOMATED: CPT

## 2023-01-01 PROCEDURE — 94761 N-INVAS EAR/PLS OXIMETRY MLT: CPT

## 2023-01-01 PROCEDURE — 6370000000 HC RX 637 (ALT 250 FOR IP)

## 2023-01-01 PROCEDURE — 94640 AIRWAY INHALATION TREATMENT: CPT

## 2023-01-01 PROCEDURE — 2580000003 HC RX 258

## 2023-01-01 PROCEDURE — 6360000002 HC RX W HCPCS

## 2023-01-01 PROCEDURE — 99232 SBSQ HOSP IP/OBS MODERATE 35: CPT | Performed by: NURSE PRACTITIONER

## 2023-01-01 PROCEDURE — 80048 BASIC METABOLIC PNL TOTAL CA: CPT

## 2023-01-01 PROCEDURE — 97530 THERAPEUTIC ACTIVITIES: CPT

## 2023-01-01 PROCEDURE — 97535 SELF CARE MNGMENT TRAINING: CPT

## 2023-01-01 PROCEDURE — 97166 OT EVAL MOD COMPLEX 45 MIN: CPT

## 2023-01-01 PROCEDURE — 36415 COLL VENOUS BLD VENIPUNCTURE: CPT

## 2023-01-01 PROCEDURE — 6370000000 HC RX 637 (ALT 250 FOR IP): Performed by: NURSE PRACTITIONER

## 2023-01-01 PROCEDURE — 6370000000 HC RX 637 (ALT 250 FOR IP): Performed by: STUDENT IN AN ORGANIZED HEALTH CARE EDUCATION/TRAINING PROGRAM

## 2023-01-01 PROCEDURE — 92610 EVALUATE SWALLOWING FUNCTION: CPT

## 2023-01-01 RX ORDER — IPRATROPIUM BROMIDE AND ALBUTEROL SULFATE 2.5; .5 MG/3ML; MG/3ML
1 SOLUTION RESPIRATORY (INHALATION) EVERY 4 HOURS PRN
Status: DISCONTINUED | OUTPATIENT
Start: 2023-01-01 | End: 2023-01-05 | Stop reason: HOSPADM

## 2023-01-01 RX ORDER — ATORVASTATIN CALCIUM 10 MG/1
10 TABLET, FILM COATED ORAL DAILY
Status: DISCONTINUED | OUTPATIENT
Start: 2023-01-01 | End: 2023-01-05 | Stop reason: HOSPADM

## 2023-01-01 RX ORDER — MONTELUKAST SODIUM 10 MG/1
10 TABLET ORAL DAILY
Status: DISCONTINUED | OUTPATIENT
Start: 2023-01-01 | End: 2023-01-05 | Stop reason: HOSPADM

## 2023-01-01 RX ORDER — ALLOPURINOL 100 MG/1
100 TABLET ORAL DAILY
Status: DISCONTINUED | OUTPATIENT
Start: 2023-01-01 | End: 2023-01-05 | Stop reason: HOSPADM

## 2023-01-01 RX ORDER — LEVOTHYROXINE SODIUM 0.07 MG/1
75 TABLET ORAL DAILY
Status: DISCONTINUED | OUTPATIENT
Start: 2023-01-01 | End: 2023-01-05 | Stop reason: HOSPADM

## 2023-01-01 RX ORDER — TORSEMIDE 20 MG/1
10 TABLET ORAL DAILY
Status: DISCONTINUED | OUTPATIENT
Start: 2023-01-01 | End: 2023-01-03

## 2023-01-01 RX ORDER — AMIODARONE HYDROCHLORIDE 200 MG/1
200 TABLET ORAL DAILY
Status: DISCONTINUED | OUTPATIENT
Start: 2023-01-01 | End: 2023-01-05

## 2023-01-01 RX ORDER — ACETAMINOPHEN 650 MG/1
650 SUPPOSITORY RECTAL EVERY 4 HOURS PRN
Status: DISCONTINUED | OUTPATIENT
Start: 2023-01-01 | End: 2023-01-05 | Stop reason: HOSPADM

## 2023-01-01 RX ORDER — ACETAMINOPHEN 325 MG/1
650 TABLET ORAL EVERY 4 HOURS PRN
Status: DISCONTINUED | OUTPATIENT
Start: 2023-01-01 | End: 2023-01-05 | Stop reason: HOSPADM

## 2023-01-01 RX ADMIN — AMIODARONE HYDROCHLORIDE 200 MG: 200 TABLET ORAL at 14:29

## 2023-01-01 RX ADMIN — TORSEMIDE 10 MG: 20 TABLET ORAL at 14:30

## 2023-01-01 RX ADMIN — LEVETIRACETAM 500 MG: 100 INJECTION INTRAVENOUS at 19:51

## 2023-01-01 RX ADMIN — ATORVASTATIN CALCIUM 10 MG: 10 TABLET, FILM COATED ORAL at 14:30

## 2023-01-01 RX ADMIN — LEVETIRACETAM 500 MG: 100 INJECTION INTRAVENOUS at 07:42

## 2023-01-01 RX ADMIN — MONTELUKAST 10 MG: 10 TABLET, FILM COATED ORAL at 14:30

## 2023-01-01 RX ADMIN — IPRATROPIUM BROMIDE AND ALBUTEROL SULFATE 1 AMPULE: 2.5; .5 SOLUTION RESPIRATORY (INHALATION) at 07:50

## 2023-01-01 RX ADMIN — ARFORMOTEROL TARTRATE 15 MCG: 15 SOLUTION RESPIRATORY (INHALATION) at 07:50

## 2023-01-01 RX ADMIN — ALLOPURINOL 100 MG: 100 TABLET ORAL at 14:30

## 2023-01-01 RX ADMIN — ACETAMINOPHEN 650 MG: 325 TABLET ORAL at 15:03

## 2023-01-01 RX ADMIN — BUDESONIDE 500 MCG: 0.5 SUSPENSION RESPIRATORY (INHALATION) at 07:50

## 2023-01-01 RX ADMIN — LEVOTHYROXINE SODIUM 75 MCG: 0.07 TABLET ORAL at 14:30

## 2023-01-01 ASSESSMENT — PAIN SCALES - GENERAL
PAINLEVEL_OUTOF10: 2
PAINLEVEL_OUTOF10: 2
PAINLEVEL_OUTOF10: 3
PAINLEVEL_OUTOF10: 2
PAINLEVEL_OUTOF10: 4

## 2023-01-01 ASSESSMENT — PAIN DESCRIPTION - LOCATION: LOCATION: LEG;HEAD

## 2023-01-01 NOTE — PROGRESS NOTES
ICU Progress Note    Admit Date: 12/31/2022  Day: 1  IV Access: Peripheral  IV Fluids: None  Vasopressors: None       Antibiotics: None  Diet: Diet NPO    CC: Fall    Interval history: Spoke to the patient at the bedside this morning. There were no acute events overnight. Patient did have a repeat CT scan of the head without contrast overnight which was stable. Otherwise, there were no acute vents overnight. Patient is currently off of the nicardipine drip as her pressures have been much better controlled. Noted to be 102/75 this morning at the bedside. Patient's labs were appreciated and been largely stable. Patient's vitals also appreciated largely stable. At this time, anticipate likely downgrade to floor status given stability scan. HPI: Catalina Kidd is a 81 y.o. female with PMHx of A fib (watchman device), COPD (Home oxygen), Venous insufficiency, HTN, multiple AVMs (capsule endoscopy)  multiple falls past couple of months (3 in last week), Obstructive sleep apnea hypopnea, CKD Stage 3 and Heart failure who presented to the emergency department of [facility] with fall. As per patient she was lifting a heavy grocery and putting in the car afterwards she does not remember anything until she was sitting in the car with help her. She was informed that she fell and hit her head on the pole of handicap sign. Patient also complained of headache with bleeding laceration and also pain in right leg with laceration. Patient also reported that she has started seeing cardiologist for heart failure, and nephrologist for kidney injury after one fall. In the ED of [facility] patient was found oriented x 4 with  scalp contusion/hematoma noted and laceration on right leg. She was vitally stable. Labs were remarkable for elevated creatinine 1.7 (may be chronic), and elevated trops (0.02). 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 Neurosurgery was consulted and patient was transferred to [facility] ICU     On my interview here at [facility]. patient was oriented x3 with slight distress due to head trauma. She complains of headache and leg pain. However, she denies any vision changes, chest pain, chest tightness, light headedness, abd pain, change in urinary or bowel habits, or any unintended weight change. Medications:     Scheduled Meds:   levETIRAcetam  500 mg IntraVENous Q12H    budesonide  0.5 mg Nebulization BID    And    Arformoterol Tartrate  15 mcg Nebulization BID     Continuous Infusions:  PRN Meds:ipratropium-albuterol    Objective:   Vitals:   T-max:  Patient Vitals for the past 8 hrs:   BP Temp Temp src Pulse Resp SpO2 Weight   01/01/23 0750 -- -- -- 61 16 92 % --   01/01/23 0400 (!) 134/44 98.5 °F (36.9 °C) Oral 59 20 91 % 173 lb 4.5 oz (78.6 kg)       Intake/Output Summary (Last 24 hours) at 1/1/2023 0935  Last data filed at 1/1/2023 0400  Gross per 24 hour   Intake --   Output 1100 ml   Net -1100 ml       Physical Exam  Constitutional:       General: She is awake. Appearance: Normal appearance. HENT:      Head: Abrasion and contusion present. Eyes:      Extraocular Movements: Extraocular movements intact. Cardiovascular:      Rate and Rhythm: Normal rate and regular rhythm. Pulses:           Radial pulses are 2+ on the right side and 2+ on the left side. Heart sounds: Normal heart sounds, S1 normal and S2 normal. No murmur heard. Pulmonary:      Effort: Pulmonary effort is normal.      Breath sounds: Normal breath sounds. Abdominal:      General: Abdomen is flat. Bowel sounds are normal.      Palpations: Abdomen is soft. Tenderness: There is no abdominal tenderness. Musculoskeletal:      Right lower leg: No edema. Left lower leg: No edema. Neurological:      General: No focal deficit present. Mental Status: She is alert. Cranial Nerves: Cranial nerves 2-12 are intact.  No cranial nerve deficit. Sensory: Sensation is intact. Motor: Motor function is intact. Psychiatric:         Behavior: Behavior is cooperative. LABS:    CBC:   Recent Labs     12/30/22 2017 12/31/22  0745 01/01/23  0601   WBC 3.7* 4.0 7.9   HGB 9.2* 8.2* 9.0*   HCT 29.0* 25.5* 28.1*    162 160   MCV 90.7 90.9 91.2     Renal:    Recent Labs     12/30/22 2017 12/31/22  0745 01/01/23  0601    137 139   K 3.9 4.0 4.3   CL 99 102 101   CO2 30 26 24   BUN 17 14 13   CREATININE 1.7* 1.3* 1.3*   GLUCOSE 108* 83 75   CALCIUM 9.1 7.9* 8.9   ANIONGAP 9 9 14     Hepatic:   Recent Labs     12/30/22 2017   AST 21   ALT 11   BILITOT 0.5   PROT 6.3*   LABALBU 3.4   ALKPHOS 149*     Troponin:   Recent Labs     12/30/22 2017 12/31/22  0745   TROPONINI 0.02* 0.01     BNP: No results for input(s): BNP in the last 72 hours. Lipids: No results for input(s): CHOL, HDL in the last 72 hours. Invalid input(s): LDLCALCU, TRIGLYCERIDE  ABGs:  No results for input(s): PHART, TSO4MXM, PO2ART, JSR5PAT, BEART, THGBART, T0HZJCNF, SXL8EUG in the last 72 hours. INR:   Recent Labs     12/30/22 2017   INR 1.06     Lactate: No results for input(s): LACTATE in the last 72 hours. Cultures:  -----------------------------------------------------------------  RAD:   CT HEAD WO CONTRAST   Final Result      1. Stable left frontal intraparenchymal hematoma measuring 9 mm. 2.  Stable left subdural hematoma measuring up to 10 mm in thickness. 3.  Stable scattered foci of subarachnoid hemorrhage. 4.  Stable small left frontal scalp hematoma. CT HEAD WO CONTRAST   Final Result      New left frontal subdural hematoma measuring up to 10 mm in maximal thickness without significant mass effect or midline shift. Stable 9 mm intraparenchymal hematoma within the anterior left frontal lobe. Stable multi foci of subarachnoid hemorrhage. Mild chronic small vessel ischemic changes.       Critical result called to the patient's nurse in the ICU at 11:11 AM 12/31/2022            Assessment/Plan:   Ariel Meehan is a 81 y.o. female with past medical history of atrial fibrillation status post watchman device, COPD on home oxygen, venous insufficiency, hypertension, multiple AVMs, and multiple falls over the last number of months who presented to the emergency department after sustaining a fall. Patient was found to have a left frontal intraparenchymal hemorrhage of around 9 mm, a left subdural hematoma around 10 mm, as well as some scattered foci of subarachnoid hemorrhage. These hemorrhages have been stable on imaging since admission. Left frontal intraparenchymal hemorrhage of around 9 mm, left subdural hematoma of around 10 mm in thickness, stable scattered foci of subarachnoid hemorrhage secondary to mechanical fall.- these bleeds have all been stable since admission. Continue Keppra 500 twice daily. Change neurochecks to 4 hrs  Maintain systolic blood pressure below 160  Will utilize hydralazine 10 mg IV as needed and labetalol 10 IV as needed  Status post nicardipine drip  Neurosurgery consulted, appreciate continued recommendations. Acute kidney injury on CKD 3-patient has a baseline creatinine of around 1.2. Creatinine was noted to be 1.7 upon admission. Continue to trend creatinine daily with a BMP-currently 1.3  Urine sodium noted to be 109  Urine creatinine noted to be normal at 44.4. Patient's at home Lasix is currently on hold. Troponinemia- RESOLVED  Patient has a history of CKD which is likely contributing to her troponinemia. Patient had an EKG which was unremarkable. No longer trending troponins  Chronic medical conditions  Hypertension- resume home meds  COPD-at home Symbicort continued  Atrial fibrillation-patient is status post watchman device insertion.   We will continue to monitor for signs of systemic DVT versus stroke      Code Status:Prior  FEN: Diet NPO  PPX:  SCDs, Keppra  DISPO: [facility]    Berta Delgado MD  PGY1, Internal Medicine  01/01/23  9:35 AM]    This patient has been staffed and discussed with Sadie Pratt MD. Pulmonary and Critical Care     Pulmonary & Critical Care     Patient seen and examined. I agree with Dr. Baker's history, physical, lab findings, assessment and plan and edited as needed.      Sadie Pratt MD

## 2023-01-01 NOTE — PROGRESS NOTES
Clinical Pharmacy Progress Note    IVs in NS - Management by Pharmacy    Consult Date(s): 12/31/22  Consulting Provider(s): Dr. Erna Akins / Plan  1)  SAH - All IVs in Normal Saline  Drips will be adjusted to normal saline as appropriate based on compatibility, in an effort to avoid fluid shifts, as D5W is osmotically active. The following intermittent IV drips / infusions have been adjusted to saline:  Levetiracetam  If ordered, the following medications must remain in D5W due to incompatibility with normal saline:  None at this time  Note: Patient may have dextrose ordered as part of hypoglycemia treatment protocol. Total IV fluid delivered to patient over last 24 hrs: TBD  Pharmacist will follow daily to ensure all IVPBs / drips are in NS where possible.     Madison Cassidy, PharmD  Main Pharmacy: [id number]  1/1/2023 12:36 PM

## 2023-01-01 NOTE — PROGRESS NOTES
Speech Language Pathology  Facility/Department: [facility] ICU   CLINICAL BEDSIDE SWALLOW EVALUATION  Speech, language, cognitive screen  Treatment     NAME: Marbella Hammond  : 1935  MRN: [id number]    ADMISSION DATE: 2022  ADMITTING DIAGNOSIS: has Vaginal atrophy; History of endometrial cancer; Frequent UTI; H/O total hysterectomy with bilateral salpingo-oophorectomy (BSO); Acute intra-cranial hemorrhage ([address] Intracranial hemorrhage ([address] KYARA (acute kidney injury) ([address] Normocytic anemia; Hypertension; Hyperlipidemia; and COPD (chronic obstructive pulmonary disease) ([address] on their problem list.  ONSET DATE: 22    Recent Chest Xray 22     1. Cardiomegaly without evidence of CHF. 2. No confluent airspace consolidation. CT of head 22  Impression       1. Stable left frontal intraparenchymal hematoma measuring 9 mm. 2.  Stable left subdural hematoma measuring up to 10 mm in thickness. 3.  Stable scattered foci of subarachnoid hemorrhage. 4.  Stable small left frontal scalp hematoma. Date of Eval: 2023  Evaluating Therapist: CARLOS MANUEL Powell    Current Diet level:  Current Diet : NPO      Primary Complaint  Patient Complaint: pt without complaints    Pain:  Denied pain     Reason for Referral  Marbella Hammond was referred for a bedside swallow evaluation to assess the efficiency of her swallow function, identify signs and symptoms of aspiration and make recommendations regarding safe dietary consistencies, effective compensatory strategies, and safe eating environment. .    .    Admission HPI:   Marbella Hammond is a 81 y.o. y/o female with PMH significant for HLD, HTN, lymphedema (BLE), frequent falls, a-fib, and osteoarthritis. Per my interview with the patient she was just finishing up putting her groceries in her car when she fell, lost consciousness, and was awakened by people in the parking lot calling EMS.   Patient states she only takes a low dose ASA at home. Patient was taken to [facility] ED. CT head showed a 9mm hemrorhagic contusion in her L frontal lobe, and multifocal SAH in the L frontal L parietal lobes. 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 Multiple abrasions and lacerations are noted on the patient's head and leg in the ED. Patient transferred to [facility] ICU fur further evaluation and treatment. Of note: patient has been having frequent falls lately. Patient states she just loses her balance and will fall with no feelings of lightheadedness, dizziness, or weakness. Per my interview with the patient she was just finishing up putting her groceries in her car when she fell, lost consciousness, and was awakened by people in the parking lot calling EMS. Patient states she only takes a low dose ASA at home. Patient was taken to [facility] ED. CT head showed a 9mm hemrorhagic contusion in her L frontal lobe, and multifocal SAH in the L frontal L parietal lobes. 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 Multiple abrasions and lacerations are noted on the patient's head and leg in the ED. Patient transferred to [facility] ICU fur further evaluation and treatment. Of note: patient has been having frequent falls lately. Patient states she just loses her balance and will fall with no feelings of lightheadedness, dizziness, or weakness. Impression  Pt was alert and oriented x3. Speech is clear with no instances of word finding difficulty. Pt able to follow commands and respond to questions appropriately. ROM of oral structures was [facility]. Pt had no difficulty closing lips around spoon or drawing liquid up a straw. Pt had no difficulty with mastication with solids. There was no anterior spillage or oral residue noted with any consistency. Pt demonstrated no s/s aspiration with any consistency presented.   Pt able to initiate swallow without difficulty with laryngeal movement observed. Vocal quality remained clear throughout. No coughing, throat clearing or choking observed with any consistency. Pt consumed 3oz water uninterrupted by cup/straw without difficulty. Given pt's age and results of MRI, will see 1-3x. Dysphagia Diagnosis: Swallow function appears WFL;No clinical indicators of dysphagia  Dysphagia Outcome Severity Scale: Level 7: Normal in all situations     Treatment Plan  Requires SLP Intervention: Yes     D/C Recommendations: To be determined       Recommended Diet and Intervention   Diet recommendation-Regular with thin liquids-Make NPO if s/s aspiration emerge and alert SLP     Recommended Form of Meds: PO  Recommendations: Dysphagia treatment       Compensatory Swallowing Strategies  Compensatory Swallowing Strategies : Upright as possible for all oral intake    Treatment/Goals  1- The patient will tolerate recommended diet without observed clinical signs of aspiration    2- The pt/caregiver will demonstrate understanding of swallowing recommendations and concerns. 1/1-  The pt was educated to purpose of the visit, anatomy and physiology of the swallow, s/s of  aspiration, swallowing strategies, diet recommendations and possibility of being made NPO if s/s aspiration emerge. The pt stated comprehension. con't goal      General  Chart Reviewed: Yes  Behavior/Cognition: Alert; Cooperative;Pleasant mood  Respiratory Status: Room air  Communication Observation: Functional  Follows Directions: Complex  Dentition: Adequate  Patient Positioning: Upright in bed  Baseline Vocal Quality: Normal  Volitional Cough: Strong  Prior Dysphagia History: no history of dysphagia       Vision/Hearing  Vision  Vision: Within Functional Limits  Hearing  Hearing: Within functional limits      Prognosis  Individuals consulted  Consulted and agree with results and recommendations: Patient;RN  RN Name: 2004 [address] [place] [address]    Education  Patient Education: Role of SLP  Patient Education Response: Verbalizes understanding  Safety Devices in place: Yes  Type of devices: Call light within reach       Therapy Time  SLP Individual Minutes  Time In: 1005  Time Out: 1030  Minutes: 25            Pt's goal: to eat       Plan:  Continue goals per POC 1-3x  Recommended diet: Regular with thin liquids-Make NPO if s/s aspiration emerge and alert SLP  Total treatment time:25  Pt's discharge plan:to home   Discharge Plan: To be determined closer to discharge  Discussed with alice YUAN   Needs within reach.        [facility]- [address] [facility]  Pg # [phone]  This document will serve as a discharge summary if pt discharge before next treatment   session  '

## 2023-01-01 NOTE — PROGRESS NOTES
Physical Therapy/Occupational Therapy  No treatment  Chart reviewed for PT/OT evaluation. Patient remains on strict bedrest awaiting neurosurgery consult. Will hold at this time and follow up when activity orders have been updated.       [address]  Jessie Cancel

## 2023-01-01 NOTE — PROGRESS NOTES
Uneventful night. Neurologically stable, no change from previous assessment. Alert and oriented x4, NIHSS 0.  VSS, RA, sinus rhythm or valentina, afebrile.

## 2023-01-01 NOTE — PROGRESS NOTES
Pt resting in bed, Spoke with Neuro NP Som Blazing and pt is cleared for a swallow eval and PT OT session.

## 2023-01-01 NOTE — PROGRESS NOTES
Hospitalist Progress Note      PCP: Scarlett Salas    Date of Admission: 12/31/2022    Chief Complaint: fall    Subjective: patient has mild pain at left side of her head, getting better from before, she has chronic numbness, no other significant symptom. Medications:  Reviewed    Infusion Medications   Scheduled Medications    allopurinol  100 mg Oral Daily    amiodarone  200 mg Oral Daily    levothyroxine  75 mcg Oral Daily    montelukast  10 mg Oral Daily    atorvastatin  10 mg Oral Daily    torsemide  10 mg Oral Daily    levETIRAcetam  500 mg IntraVENous Q12H    budesonide  0.5 mg Nebulization BID    And    Arformoterol Tartrate  15 mcg Nebulization BID     PRN Meds: ipratropium-albuterol, acetaminophen, acetaminophen      Intake/Output Summary (Last 24 hours) at 1/1/2023 1711  Last data filed at 1/1/2023 1440  Gross per 24 hour   Intake 720 ml   Output 1100 ml   Net -380 ml       Physical Exam Performed:    BP (!) 121/59   Pulse 67   Temp 98.4 °F (36.9 °C) (Oral)   Resp 17   Wt 173 lb 4.5 oz (78.6 kg)   SpO2 (!) 89%   BMI 32.74 kg/m²     General appearance: No apparent distress, appears stated age and cooperative. HEENT: bruises more at left side of the face, clear conjunctive with no bleeding. Respiratory:  Normal respiratory effort. Clear to auscultation, bilaterally without Rales/Wheezes/Rhonchi. Cardiovascular: Regular rate and rhythm with normal S1/S2 without murmurs, rubs or gallops. Abdomen: Soft, non-tender, non-distended with normal bowel sounds. Musculoskeletal: No clubbing, cyanosis or edema bilaterally. Full range of motion without deformity.   Psychiatric: Alert and oriented, thought content appropriate, normal insight    Labs:   Recent Labs     12/30/22 2017 12/31/22  0745 01/01/23  0601   WBC 3.7* 4.0 7.9   HGB 9.2* 8.2* 9.0*   HCT 29.0* 25.5* 28.1*    162 160     Recent Labs     12/30/22 2017 12/31/22  0745 01/01/23  0601    137 139   K 3.9 4.0 4.3   CL 99 102 101   CO2 30 26 24   BUN 17 14 13   CREATININE 1.7* 1.3* 1.3*   CALCIUM 9.1 7.9* 8.9     Recent Labs     12/30/22 2017   AST 21   ALT 11   BILITOT 0.5   ALKPHOS 149*     Recent Labs     12/30/22 2017   INR 1.06     Recent Labs     12/30/22 2017 12/31/22  0745   TROPONINI 0.02* 0.01       Urinalysis:      Lab Results   Component Value Date/Time    NITRU Negative 12/30/2022 09:24 PM    BLOODU Negative 12/30/2022 09:24 PM    SPECGRAV 1.015 12/30/2022 09:24 PM    GLUCOSEU Negative 12/30/2022 09:24 PM       Radiology:  CT HEAD WO CONTRAST   Final Result      1. Stable left frontal intraparenchymal hematoma measuring 9 mm. 2.  Stable left subdural hematoma measuring up to 10 mm in thickness. 3.  Stable scattered foci of subarachnoid hemorrhage. 4.  Stable small left frontal scalp hematoma. CT HEAD WO CONTRAST   Final Result      New left frontal subdural hematoma measuring up to 10 mm in maximal thickness without significant mass effect or midline shift. Stable 9 mm intraparenchymal hematoma within the anterior left frontal lobe. Stable multi foci of subarachnoid hemorrhage. Mild chronic small vessel ischemic changes. Critical result called to the patient's nurse in the ICU at 11:11 AM 12/31/2022              Assessment/Plan:    Danielle [address] Problems    Diagnosis     Intracranial hemorrhage ([address] [I62.9]      Priority: Medium    KYARA (acute kidney injury) ([address] [N17.9]      Priority: Medium    Normocytic anemia [D64.9]      Priority: Medium    Hypertension [I10]      Priority: Medium    Hyperlipidemia [E78.5]      Priority: Medium    COPD (chronic obstructive pulmonary disease) ([address] [J44.9]      Priority: Medium     #. ICH: around 9 mm, left subdural hematoma of around 10 mm in thickness, stable scattered foci of subarachnoid hemorrhage secondary to mechanical fall. For ppx Keppra 500mg bid for total 2 weeks, keep bl pr less than 160.   Patient has aneurysm, will need vascular neurosurgery to follow as OP Fortino Werner)    #. KYARA on top of CKD: creat stable today, follow up creat, not sure her baseline. #. Elevated troponin: unlikely primary cardiac event, most likely from fall/ICH. #. HTN: keep less than 160    #. Hypothyroidism: home levothyroxine. #. A fib/COPD: stable, no anticoagulation as she has brain bleed     PT/OT to evaluate, if stable, patient can go home in 1-2 days.        Margi Villarreal MD

## 2023-01-01 NOTE — PROGRESS NOTES
NEUROLOGY / NEUROCRITICAL CARE PROGRESS NOTE       Patient Name: Regina Sorenson YOB: 1935   Sex: Female Age: 81 yrs     CC / Reason for Consult: traumatic SAH and SDH    Interval Hx / Changes over last 24 hours:     Clinically stable. ROS: \"I'm thirsty\"    HISTORY   Admission HPI:   Regina Sorenson is a 81 y.o. y/o female with PMH significant for HLD, HTN, lymphedema (BLE), frequent falls, a-fib, and osteoarthritis. Per my interview with the patient she was just finishing up putting her groceries in her car when she fell, lost consciousness, and was awakened by people in the parking lot calling EMS. Patient states she only takes a low dose ASA at home. Patient was taken to [facility] ED. CT head showed a 9mm hemrorhagic contusion in her L frontal lobe, and multifocal SAH in the L frontal L parietal lobes. 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 Multiple abrasions and lacerations are noted on the patient's head and leg in the ED. Patient transferred to [facility] ICU fur further evaluation and treatment. Of note: patient has been having frequent falls lately. Patient states she just loses her balance and will fall with no feelings of lightheadedness, dizziness, or weakness.             PMH Past Medical History:   Diagnosis Date    Asthma     Cancer ([facility]     skin cancer basal and squamous    History of blood transfusion     Hyperlipidemia     Hypertension     Leg swelling     Osteoarthritis     UTI symptoms     Venous insufficiency of leg       Allergies Allergies   Allergen Reactions    Other      Pollen      Diet No diet orders on file   Isolation No active isolations     CURRENT SCHEDULED MEDICATIONS   Inpatient Medications     levETIRAcetam, 500 mg, IntraVENous, Q12H    budesonide, 0.5 mg, Nebulization, BID **AND** Arformoterol Tartrate, 15 mcg, Nebulization, BID   Infusions      Antibiotics   Recent Abx Admin No antibiotic orders with administrations found. LABS   Metabolic Panel Recent Labs     12/30/22 2017 12/31/22  0745 01/01/23  0601    137 139   K 3.9 4.0 4.3   CL 99 102 101   CO2 30 26 24   BUN 17 14 13   CREATININE 1.7* 1.3* 1.3*   GLUCOSE 108* 83 75   CALCIUM 9.1 7.9* 8.9   LABALBU 3.4  --   --    ALKPHOS 149*  --   --    ALT 11  --   --    AST 21  --   --       CBC / Coags Recent Labs     12/30/22 2017 12/31/22  0745 01/01/23  0601   WBC 3.7* 4.0 7.9   RBC 3.20* 2.81* 3.08*   HGB 9.2* 8.2* 9.0*   HCT 29.0* 25.5* 28.1*    162 160   INR 1.06  --   --       Other Recent Labs     12/30/22  2124   COVID19 Not Detected     No results for input(s): PHENYTOIN, KEPPRA, LACOSA, LAMO, VALPROATE, LACTSEPSIS, LACTA in the last 72 hours. DIAGNOSTICS   IMAGES:  Images personally reviewed and agree w/ radiology interpretation. Head CT w/o Contrast:  1. Stable left frontal intraparenchymal hematoma measuring 9 mm   2. Stable left subdural hematoma measuring up to 10 mm in thickness. 3.  Stable scattered foci of subarachnoid hemorrhage. 4.  Stable small left frontal scalp hematoma. CTA of Head / Neck w/ Contrast:  No flow-limiting arterial stenosis in the head and neck. 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 Follow-up catheter angiogram is recommended for complete assessment.          PHYSICAL EXAMINATION     PHYSICAL EXAM:  Vitals:    01/01/23 0750 01/01/23 0800 01/01/23 0900 01/01/23 1000   BP:  (!) 123/42 102/75 (!) 111/100   Pulse: 61 57 65 63   Resp: 16 20 20 21   Temp:  98.7 °F (37.1 °C)     TempSrc:  Oral     SpO2: 92% 100% (!) 80% (!) 86%   Weight:             General: Alert, no distress, well-nourished  Neurologic  Mental status:   orientation to person, place, time, situation   Attention intact as able to attend well to the exam     Language fluent in conversation   Comprehension intact; follows simple commands    Cranial nerves:   CN2: Visual fields full w/o extinction on confrontational testing   CN 3,4,6: Pupils equal and reactive to light, extraocular muscles intact  CN5: Facial sensation symmetric   CN7: Face symmetric bilaterally   CN8: Hearing symmetric to spoken voice  CN9: Palate elevated symmetrically  CN11: Traps full strength on shoulder shrug  CN12: Tongue midline with protrusion    Motor Exam:   R  L    Deltoid 5  5   Biceps 5 5   Triceps 5 5   Wrist extension  5 5    5 5      R  L    Hip flexion  5  5   Knee flexion  5 5   Knee extension  5 5   Ankle dorsiflexion  5 5   Ankle plantar flexion  5 5         OTHER SYSTEMS:  Cardiovascular: Warm, appears well perfused  Respiratory: Easy, non-labored respiratory pattern   Abdominal: Abdomen is without distention   Extremities: Upper and lower extremities are atraumatic in appearance without deformity. No swelling or erythema. HEENT: Bruising to left side of forehead and face     ASSESSMENT & RECOMMENDATIONS   Mary Ann Morrissey is an 82 yo female who presents with a traumatic, hemorrhagic contusion and SAH s/p a fall with LOC, she developed left sided SDH over the course of hospitalization with no change in her neurologi cexam. CTA also demonstrated a 5mm aneurysm on her ARY, and another 5mm aneurysm on the basilar tip. Both are unruptured. Plan:    --Ok to downgrade to floor status with Q4H neuro checks and vital signs   -Keep SBP less than 160 mmHg  -Keep Platelets ? 100K, Keep INR ? 1.4   -Advance activity as tolerated  -ST for swallow eval, ok for regular diet  -PT/OT for dizziness and imbalance  -Continue prophylactic keppra 500mg PO BID for 14 days  -Hold antiplts / full dose anticoagulants x 2 weeks  -Headaches: Tylenol 650mg PO Q6H PRN  Dizziness: valium 2.5mg PO TID PRN  -Nausea: Zofran 4mg IV Q6H PRN.  Consider scopolamine patch for persistent nausea  -Will require 24 hour supervision for first 48 hours after discharge  -Venus Acosta with vascular neurosurgery following for unruptured aneurysms         NGOZI DENG, APRN - CNP   Neurology & Neurocritical Care   1/1/2023 10:36 AM      Floor / PCU Patients:  Neurology Line: [phone]  [facility]: [address] Neurology

## 2023-01-01 NOTE — PLAN OF CARE
Problem: Discharge Planning  Goal: Discharge to home or other facility with appropriate resources  Outcome: Progressing  Flowsheets  Taken 1/1/2023 0800  Discharge to home or other facility with appropriate resources: Identify barriers to discharge with patient and caregiver  Taken 1/1/2023 0400  Discharge to home or other facility with appropriate resources: Identify barriers to discharge with patient and caregiver     Problem: Pain  Goal: Verbalizes/displays adequate comfort level or baseline comfort level  Outcome: Progressing  Flowsheets  Taken 1/1/2023 1200  Verbalizes/displays adequate comfort level or baseline comfort level: Encourage patient to monitor pain and request assistance  Taken 1/1/2023 0800  Verbalizes/displays adequate comfort level or baseline comfort level: Encourage patient to monitor pain and request assistance     Problem: Safety - Adult  Goal: Free from fall injury  Outcome: Progressing  Flowsheets (Taken 1/1/2023 0723)  Free From Fall Injury: Instruct family/caregiver on patient safety     Problem: ABCDS Injury Assessment  Goal: Absence of physical injury  Outcome: Progressing  Flowsheets (Taken 1/1/2023 0723)  Absence of Physical Injury: Implement safety measures based on patient assessment     Problem: Skin/Tissue Integrity  Goal: Absence of new skin breakdown  Description: 1. Monitor for areas of redness and/or skin breakdown  2. Assess vascular access sites hourly  3. Every 4-6 hours minimum:  Change oxygen saturation probe site  4. Every 4-6 hours:  If on nasal continuous positive airway pressure, respiratory therapy assess nares and determine need for appliance change or resting period.   Outcome: Progressing     Problem: Neurosensory - Adult  Goal: Achieves stable or improved neurological status  Outcome: Progressing  Flowsheets  Taken 1/1/2023 0800  Achieves stable or improved neurological status: Assess for and report changes in neurological status  Taken 1/1/2023 0400  Achieves stable or improved neurological status: Initiate measures to prevent increased intracranial pressure     Problem: Neurosensory - Adult  Goal: Absence of seizures  Outcome: Progressing  Flowsheets  Taken 1/1/2023 0800  Absence of seizures: Monitor for seizure activity. If seizure occurs, document type and location of movements and any associated apnea  Taken 1/1/2023 0400  Absence of seizures: Monitor for seizure activity. If seizure occurs, document type and location of movements and any associated apnea     Problem: Neurosensory - Adult  Goal: Remains free of injury related to seizures activity  Outcome: Progressing  Flowsheets  Taken 1/1/2023 0800  Remains free of injury related to seizure activity: Maintain airway, patient safety  and administer oxygen as ordered  Taken 1/1/2023 0400  Remains free of injury related to seizure activity: Monitor patient for seizure activity, document and report duration and description of seizure to Licensed Independent Practitioner     Problem: Neurosensory - Adult  Goal: Achieves maximal functionality and self care  Outcome: Progressing  Flowsheets  Taken 1/1/2023 0800  Achieves maximal functionality and self care: Monitor swallowing and airway patency with patient fatigue and changes in neurological status  Taken 1/1/2023 0400  Achieves maximal functionality and self care: Monitor swallowing and airway patency with patient fatigue and changes in neurological status     Problem: SLP Adult - Impaired Swallowing  Goal: By Discharge: Advance to least restrictive diet without signs or symptoms of aspiration for planned discharge setting. See evaluation for individualized goals. 1/1/2023 1154 by CARLOS MANUEL Saenz  Note: Intervention: Speech Evaluation/treatment  SLP completed evaluation. Please refer to notes in EMR.

## 2023-01-01 NOTE — PROGRESS NOTES
Speech Language Pathology      Referral rec'd, chart reviewed. Awaiting clearance from neurosurgery before proceeding with assessment. Will attempt again later. Discussed with RN.     Gricelda Garcia [address]   Speech-Language Pathologist  Pager [phone]

## 2023-01-01 NOTE — PROGRESS NOTES
Physical Therapy  Facility/Department: [address] [place] [address] ICU  Physical Therapy Initial Assessment and treatment    Name: Harvest Moritz  : 1935  MRN: [id number]  Date of Service: 2023    Discharge Recommendations:    Harvest Moritz scored a 16/24 on the AM-PAC short mobility form. Current research shows that an AM-PAC score of 17 or less is typically not associated with a discharge to the patient's home setting. Based on the patient's AM-PAC score and their current functional mobility deficits, it is recommended that the patient have 5-7 sessions per week of Physical Therapy at d/c to increase the patient's independence. At this time, this patient demonstrates complex nursing, medical, and rehabilitative needs, and would benefit from intensive rehabilitation services upon discharge from the Inpatient setting. This patient demonstrates the ability to participate in and benefit from an intensive therapy program with a coordinated interdisciplinary team approach to foster frequent, structured, and documented communication among disciplines, who will work together to establish, prioritize, and achieve treatment goals. Please see assessment section for further patient specific details. PT Equipment Recommendations  Equipment Needed: No (defer to next level of care)      Patient Diagnosis(es): There were no encounter diagnoses. Past Medical History:  has a past medical history of Asthma, Cancer ([facility] [address] History of blood transfusion, Hyperlipidemia, Hypertension, Leg swelling, Osteoarthritis, UTI symptoms, and Venous insufficiency of leg. Past Surgical History:  has a past surgical history that includes pr dilation & curettage dx&/ther nonobstetric; pr vaginal hysterectomy uterus 250 gm/< (); pr arthrp kne condyle&platu medial&lat compartments (Right, ); Appendectomy (); Carpal tunnel release; Hemorrhoid surgery; hernia repair; Nasal septum surgery; Tonsillectomy;  Hysterectomy; joint replacement (Bilateral); Vein Surgery; and Capsule endoscopy (N/A, 4/1/2022). Assessment   Body Structures, Functions, Activity Limitations Requiring Skilled Therapeutic Intervention: Decreased functional mobility ; Decreased safe awareness;Decreased endurance;Decreased balance; Increased pain  Assessment: Patient tolerated session fair with mobility being limited due to left rib pain and overall decreased activity tolerance. Patient requiring mod assist to transfer to EOB and then CGA to transfer to stand and ambulate short distances in room/bathroom. Patient fatigued following ambulation to bathroom and standing at sink to perform grooming; unable to ambulate further at this time. Patient reports history of multiple falls at home, has been referred to PT for balance therapy but has not initiated it at this time. She lives alone in a mobile home and is typically modified independent with mobility. Patient currently functioning below baseline level. Recommend skilled PT upon discharge. Will follow while in acute care setting to maximize independence with mobility. Treatment Diagnosis: impaired mobility associated with ICH  Therapy Prognosis: Good  Decision Making: Medium Complexity  Requires PT Follow-Up: Yes  Activity Tolerance  Activity Tolerance: Patient limited by fatigue;Patient limited by pain; Patient limited by endurance     Plan   Physcial Therapy Plan  General Plan: 5-7 times per week  Current Treatment Recommendations: Strengthening, Balance training, Gait training, Functional mobility training, Transfer training, Endurance training, Patient/Caregiver education & training, Therapeutic activities, Safety education & training  Safety Devices  Type of Devices: Chair alarm in place, Left in chair, Call light within reach, Nurse notified, Gait belt  Restraints  Restraints Initially in Place: No     Restrictions  Position Activity Restriction  Other position/activity restrictions: no activity restrictions noted Subjective   Pain: 6/10 pain in left rib cage with mobility, RN aware, repositioned for comfort at end of session  General  Chart Reviewed: Yes  Patient assessed for rehabilitation services?: Yes  Additional Pertinent Hx: Patient is an 80 y/o female admitted 12/31 s/p fall. CT head (+) forNew left frontal subdural hematoma measuring up to 10 mm in maximal thickness without significant mass effect or midline shift. Stable 9 mm intraparenchymal hematoma within the anterior left frontal lobe. Stable multi foci of subarachnoid hemorrhage. - all stable on repeat exam.  Response To Previous Treatment: Not applicable  Family / Caregiver Present: No  Referring Practitioner: Jone Edmondson DO  Referral Date : 12/31/22  Diagnosis: ICH  Follows Commands: Within Functional Limits  General Comment  Comments: Patient supine in bed upon arrival.  Subjective  Subjective: Patient agreeable to PT evaluation.          Social/Functional History  Social/Functional History  Lives With: Alone  Type of Home: Mobile home  Home Layout: One level  Home Access: Ramped entrance  Bathroom Shower/Tub: Walk-in shower, Shower chair with back  Bathroom Toilet: Handicap height (sink for leverage)  Bathroom Equipment: Hand-held shower, Shower chair, Grab bars in [address] [facility]: Lorren Barer, rolling, Walker, 4 wheeled, Leg , Sock aid, Reacher  Has the patient had two or more falls in the past year or any fall with injury in the past year?: Yes  ADL Assistance: [address]: Independent  Homemaking Responsibilities: Yes (COA aide recently started assisting for 3 hours once a week with dusting and vaccuuming)  Ambulation Assistance: Independent  Transfer Assistance: Independent  Active : Yes  Mode of Transportation: Car  Additional Comments: reports baseline difficulty with RLE and uses leg  to get into car; pt reports son lives in same mobile home park and daughter lives closeby who works from home and could stay with her if necessary and has multiple friends who can check in  [address]: Impaired  Vision Exceptions: Wears glasses for reading  Hearing  Hearing: Within functional limits    Cognition   Orientation  Overall Orientation Status: Within Functional Limits  Cognition  Overall Cognitive Status: WFL     Objective           AROM RLE (degrees)  RLE AROM: WFL  AROM LLE (degrees)  LLE AROM : WFL  Strength RLE  Strength RLE: WFL  Strength LLE  Strength LLE: WFL        Balance  Sitting: Intact (spvn EOB)  Standing: With support (CGA with RW)  Gait  Overall Level of Assistance: Contact-guard assistance  Distance (ft):  (to/from bathroom)  Assistive Device: Walker  Bed mobility  Supine to Sit: Moderate assistance (head of bed elevated, movement effortful with increased time to complete task, assist to bring trunk to upright sitting position)  Scooting: Stand by assistance (to EOB)  Bed Mobility Comments: patient sitting up in bedside chair at end of session  Transfers  Sit to Stand: Contact guard assistance (from EOB, from toilet and from bedside chair, verbal cues for UE placement)  Stand to Sit: Contact guard assistance (to multiple surfaces outlined above, verbal cues for safety and reach back)  Ambulation  Surface: Level tile  Device: Standard Walker  Assistance: Contact guard assistance  Quality of Gait: decreased jennifer, step to pattern due to standard walker, cues for safe use of walker, gait fairly steady with no overt loss of balance noted but movement effortful  Distance: 10' into bathroom, 10' back to bedside chair  More Ambulation?: No  Stairs/Curb  Stairs?: No     Balance  Sitting - Static: Fair;+ (SBA)  Standing - Static: Fair (CGA to stand at sink and perform grooming)  Standing - Dynamic: Fair (CGA to ambulate with standard walker)           OutComes Score                                                  AM-PAC Score  AM-PAC Inpatient Mobility Raw Score : 16 (01/01/23 1507)  AM-PAC Inpatient T-Scale Score : 40.78 (01/01/23 1507)  Mobility Inpatient CMS 0-100% Score: 54.16 (01/01/23 1507)  Mobility Inpatient CMS G-Code Modifier : CK (01/01/23 1507)          Tinneti Score       Goals  Short Term Goals  Time Frame for Short Term Goals: discharge  Short Term Goal 1: patient will perform bed mobility with SBA  Short Term Goal 2: patient will perform transfers sit<>stand with supervision  Short Term Goal 3: patient will ambulate 48' with FWW and supervision  Patient Goals   Patient Goals : none stated       Education  Patient Education  Education Given To: Patient  Education Provided: Role of Therapy;Plan of Care;Transfer Training; Fall Prevention Strategies  Education Method: Verbal  Barriers to Learning: None  Education Outcome: Verbalized understanding      Therapy Time   Individual Concurrent Group Co-treatment   Time In 1408         Time Out 1059         Minutes 41         Timed Code Treatment Minutes: 26 Minutes   Timed Code Treatment Minutes:  26 Minutes    Total Treatment Minutes:    26 minutes treatment + 15 minutes evaluation = 41 total treatment minutes    If patient discharges prior to next session this note will serve as a discharge summary. Please see below for the latest assessment towards goals.     Sheldon DECKER [address]

## 2023-01-01 NOTE — PROGRESS NOTES
Occupational Therapy  Facility/Department: [facility] ICU  Occupational Therapy Initial Assessment/Treatment    Name: Deena Hanks  : 1935  MRN: [id number]  Date of Service: 2023    Discharge Recommendations: Deena Hanks scored a 18/24 on the AM-PAC ADL Inpatient form. Current research shows that an AM-PAC score of 17 or less is typically not associated with a discharge to the patient's home setting. Based on the patient's AM-PAC score and their current ADL deficits, it is recommended that the patient have 5-7 sessions per week of Occupational Therapy at d/c to increase the patient's independence. At this time, this patient demonstrates complex nursing, medical, and rehabilitative needs, and would benefit from intensive rehabilitation services upon discharge from the Inpatient setting. This patient demonstrates the ability to participate in and benefit from an intensive therapy program with a coordinated interdisciplinary team approach to foster frequent, structured, and documented communication among disciplines, who will work together to establish, prioritize, and achieve treatment goals. Please see assessment section for further patient specific details. If patient discharges prior to next session this note will serve as a discharge summary. Please see below for the latest assessment towards goals. OT Equipment Recommendations  Equipment Needed: No       Patient Diagnosis(es): There were no encounter diagnoses. Past Medical History:  has a past medical history of Asthma, Cancer ([address] History of blood transfusion, Hyperlipidemia, Hypertension, Leg swelling, Osteoarthritis, UTI symptoms, and Venous insufficiency of leg. Past Surgical History:  has a past surgical history that includes pr dilation & curettage dx&/ther nonobstetric; pr vaginal hysterectomy uterus 250 gm/< (); pr arthrp kne condyle&platu medial&lat compartments (Right, ); Appendectomy ();  Carpal tunnel release; Hemorrhoid surgery; hernia repair; Nasal septum surgery; Tonsillectomy; Hysterectomy; joint replacement (Bilateral); Vein Surgery; and Capsule endoscopy (N/A, 4/1/2022). Treatment Diagnosis: impaired ADLs, functional mobility and transfers 2/2 debility      Assessment   Performance deficits / Impairments: Decreased functional mobility ; Decreased endurance;Decreased ADL status; Decreased balance;Decreased strength;Decreased safe awareness  Assessment: Pt is an 82 y/o F s/p SAH after mechanical fall loading groceries into car. Pt reports having baseline balance deficits and has a history of frequent falls. Pt lives alone and reports independence with all ADLs and functional mobility using RW. Pt currently requires mod A for bed mobility, min A for LB dressing and CGA for functional transfers and mobility but is easily fatigued due to impaired activity tolerance. Pt is functioning below baseline and would benefit from intensive ongoing skilled OT to maximize functional independence for safe d/c home as pt will need to be independent since she lives alone. Will follow during inpt stay.   Treatment Diagnosis: impaired ADLs, functional mobility and transfers 2/2 debility  Prognosis: Good  Decision Making: Medium Complexity  REQUIRES OT FOLLOW-UP: Yes  Activity Tolerance  Activity Tolerance: Patient Tolerated treatment well;Patient limited by fatigue        Plan   Occupational Therapy Plan  Times Per Week: 5-7  Current Treatment Recommendations: Strengthening, Functional mobility training, Balance training, Endurance training, Neuromuscular re-education, Patient/Caregiver education & training, Safety education & training, Self-Care / ADL     Restrictions  Position Activity Restriction  Other position/activity restrictions: no activity restrictions noted    Subjective   General  Chart Reviewed: Yes  Patient assessed for rehabilitation services?: Yes  Additional Pertinent Hx: 82yo woman with HLD; HTN; BLE lymphedema; frequent falls; afib; OA. [facility] and was found to have a 9mm hemorrhagic contusion in her left frontal lobe with some scattered SAH in her left frontal and parietal lobes. 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 Family / Caregiver Present: No  Referring Practitioner: Ilene Pizarro DO  Diagnosis: intracranial hemorrhage  Subjective  Subjective: Pt semi supine in bed upon arrival, pleasant and agreeable to OT evaluation. \"For over a year now the doctor's have recommended I get balance therapy. \"  6/10 pain in left rib cage with mobility, RN aware, repositioned for comfort at end of session      Social/Functional History  Social/Functional History  Lives With: Alone  Type of Home: Mobile home  Home Layout: One level  Home Access: Ramped entrance  Bathroom Shower/Tub: Walk-in shower, Shower chair with back  Bathroom Toilet: Handicap height (sink for leverage)  Bathroom Equipment: Hand-held shower, Shower chair, Grab bars in shower  Home Equipment: Cathy Olivares, rolling, Cathy Olivares, 4 wheeled, Leg , Sock aid, Reacher  Has the patient had two or more falls in the past year or any fall with injury in the past year?: Yes  ADL Assistance: [facility]: Independent  Homemaking Responsibilities: Yes (COA aide recently started assisting for 3 hours once a week with dusting and vaccuuming)  Ambulation Assistance: Independent  Transfer Assistance: Independent  Active : Yes  Mode of Transportation: Car  Additional Comments: reports baseline difficulty with RLE and uses leg  to get into car; pt reports son lives in same mobile home park and daughter lives closeby who works from home and could stay with her if necessary and has multiple friends who can check in       Objective   Heart Rate: 63  Heart Rate Source: Monitor  BP: (!) 127/47  BP Location: Right upper arm  BP Method: Automatic  Patient Position: Semi fowlers  MAP (Calculated): 74  Resp: 20  SpO2: (!) 86 %  O2 Device: None (Room air)             Safety Devices  Type of Devices: Chair alarm in place; Left in chair;Call light within reach;Nurse notified  Restraints  Restraints Initially in Place: No  Balance  Sitting: Intact (spvn EOB)  Standing: With support (CGA with RW)  Gait  Overall Level of Assistance: Contact-guard assistance  Distance (ft):  (to/from bathroom)  Assistive Device: Walker  Toilet Transfers  Toilet - Technique: Ambulating  Equipment Used: Standard toilet  Toilet Transfer: Contact guard assistance  Toilet Transfers Comments: use of GB on L  Tone: Normal  Sensation: Impaired (peripheral neuropathy in BLEs)      ADL  Feeding: Independent; Beverage management  Grooming: Contact guard assistance  Grooming Skilled Clinical Factors: hand and oral hygiene in stance at sink, VCs for RW placement  LE Dressing: Minimal assistance  LE Dressing Skilled Clinical Factors: pt doffed soiled briefs seated on commode with SBA, pt req min A to don briefs seated on recliner req assist to thread LLE after + time and effort via trunk flexion, CGA in stance to manage over hips  Toileting: Contact guard assistance  Toileting Skilled Clinical Factors: pt continent of urine, perihygiene and LB clothing mgmt in stance with CGA         Activity Tolerance  Activity Tolerance: Patient limited by fatigue;Patient limited by pain; Patient limited by endurance  Bed mobility  Supine to Sit:  (HOB elevated, use of bed rail, assist to manage trunk and cues to sequence, + time required)  Scooting: Stand by assistance  Transfers  Sit to stand: Contact guard assistance (from EOB to RW and from standard chair to RW)  Stand to sit: Contact guard assistance  Vision  Vision: Impaired  Vision Exceptions: Wears glasses for reading  Hearing  Hearing: Within functional limits      Cognition  Overall Cognitive Status: [facility]  Orientation  Overall Orientation Status: Within Functional Limits                  Education Given To: Patient  Education Provided: Role of Therapy;Plan of Care;ADL Adaptive Strategies;Transfer Training; Fall Prevention Strategies;Precautions  Education Method: Verbal  Barriers to Learning: None  Education Outcome: Verbalized understanding                        G-Code     OutComes Score                                                  AM-PAC Score        AM-PAC Inpatient Daily Activity Raw Score: 18 (01/01/23 1509)  AM-PAC Inpatient ADL T-Scale Score : 38.66 (01/01/23 1509)  ADL Inpatient CMS 0-100% Score: 46.65 (01/01/23 1509)  ADL Inpatient CMS G-Code Modifier : CK (01/01/23 1509)    Tinneti Score       Goals  Short Term Goals  Time Frame for Short Term Goals: by dc  Short Term Goal 1: Pt will complete LB dressing with SBA  Short Term Goal 2: Pt will don/doff footwear with mod I using AE  Short Term Goal 3: Pt will tolerate 5 minutes in stance for grooming activity  Patient Goals   Patient goals : to go home       Therapy Time   Individual Concurrent Group Co-treatment   Time In 1409         Time Out 1449         Minutes 40         Timed Code Treatment Minutes: [id number], OT

## 2023-01-01 NOTE — RT PROTOCOL NOTE
RT Nebulizer Bronchodilator Protocol Note    There is a bronchodilator order in the chart from a provider indicating to follow the RT Bronchodilator Protocol and there is an Initiate RT Bronchodilator Protocol order as well (see protocol at bottom of note). CXR Findings:  XR CHEST PORTABLE    Result Date: 12/30/2022  1. Cardiomegaly without evidence of CHF. 2. No confluent airspace consolidation. The findings from the last RT Protocol Assessment were as follows:  Smoking: Chronic pulmonary disease  Respiratory Pattern: Regular pattern and RR 12-20 bpm  Breath Sounds: Slightly diminished and/or crackles  Cough: Strong, spontaneous, non-productive  Indication for Bronchodilator Therapy: On home bronchodilators  Bronchodilator Assessment Score: 4    Aerosolized bronchodilator medication orders have been revised according to the RT Nebulizer Bronchodilator Protocol below. Respiratory Therapist to perform RT Therapy Protocol Assessment initially then follow the protocol. Repeat RT Therapy Protocol Assessment PRN for score 0-3 or on second treatment, BID, and PRN for scores above 3. No Indications - adjust the frequency to every 6 hours PRN wheezing or bronchospasm, if no treatments needed after 48 hours then discontinue using Per Protocol order mode. If indication present, adjust the RT bronchodilator orders based on the Bronchodilator Assessment Score as indicated below. If a patient is on this medication at home then do not decrease Frequency below that used at home. 0-3 - enter or revise RT bronchodilator order(s) to equivalent RT Bronchodilator order with Frequency of every 4 hours PRN for wheezing or increased work of breathing using Per Protocol order mode.        4-6 - enter or revise RT Bronchodilator order(s) to two equivalent RT bronchodilator orders with one order with BID Frequency and one order with Frequency of every 4 hours PRN wheezing or increased work of breathing using Per Protocol order mode. 7-10 - enter or revise RT Bronchodilator order(s) to two equivalent RT bronchodilator orders with one order with TID Frequency and one order with Frequency of every 4 hours PRN wheezing or increased work of breathing using Per Protocol order mode. 11-13 - enter or revise RT Bronchodilator order(s) to one equivalent RT bronchodilator order with QID Frequency and an Albuterol order with Frequency of every 4 hours PRN wheezing or increased work of breathing using Per Protocol order mode. Greater than 13 - enter or revise RT Bronchodilator order(s) to one equivalent RT bronchodilator order with every 4 hours Frequency and an Albuterol order with Frequency of every 2 hours PRN wheezing or increased work of breathing using Per Protocol order mode. RT to enter RT Home Evaluation for COPD & MDI Assessment order using Per Protocol order mode.     Electronically signed by Haile Rivera RCP on 1/1/2023 at 7:54 AM

## 2023-01-01 NOTE — DISCHARGE INSTRUCTIONS
Extra Heart Failure sites:   https://Scality.JoGuru/ --- this is American Heart Association interactive Healthier Living with Heart Failure guidebook. Please copy and paste link into search bar. Use your mouse to scroll through the pages. Lots and lots of info / tips    HF [place] marquis  --- free smart phone marquis available for [employer] and [employer]. Use your phone to track sodium / fluid intake,  symptoms, weight, etc.    [employer] - website-- [employer] is a dialysis company. All dialysis patients follow a renal diet which IS low sodium!! This website offers free seasonal cookbooks.   Each quarter, they will release 25-30 new recipes with a breakdown of calories, sodium, glucose, etc    [email or web address].[email or web address] -- more free recipes

## 2023-01-01 NOTE — PLAN OF CARE
Problem: Neurosensory - Adult  Goal: Achieves stable or improved neurological status  12/31/2022 2149 by Nikos Ma RN  Flowsheets (Taken 12/31/2022 2149)  Achieves stable or improved neurological status:   Assess for and report changes in neurological status   Initiate measures to prevent increased intracranial pressure   Maintain blood pressure and fluid volume within ordered parameters to optimize cerebral perfusion and minimize risk of hemorrhage   Monitor temperature, glucose, and sodium. Initiate appropriate interventions as ordered  12/31/2022 2147 by Nikos Ma RN  Outcome: Progressing  Goal: Absence of seizures  12/31/2022 2149 by Nikos Ma RN  Flowsheets (Taken 12/31/2022 2149)  Absence of seizures:   Monitor for seizure activity.   If seizure occurs, document type and location of movements and any associated apnea   If seizure occurs, turn head to side and suction secretions as needed   Administer anticonvulsants as ordered   Support airway/breathing, administer oxygen as needed   Diagnostic studies as ordered  12/31/2022 2147 by Nikos Ma RN  Outcome: Progressing  Goal: Remains free of injury related to seizures activity  Outcome: Progressing  Goal: Achieves maximal functionality and self care  12/31/2022 2149 by Mauricio Trujillo7 [address] (Taken 12/31/2022 2149)  Achieves maximal functionality and self care:   Monitor swallowing and airway patency with patient fatigue and changes in neurological status   Encourage and assist patient to increase activity and self care with guidance from physical therapy/occupational therapy  12/31/2022 2147 by Nikos aM RN  Outcome: Progressing     Problem: Safety - Adult  Goal: Free from fall injury  12/31/2022 2147 by Nikos Ma RN  Outcome: Progressing  Flowsheets (Taken 12/31/2022 2000)  Free From Fall Injury: Instruct family/caregiver on patient safety    Problem: Pain  Goal: Verbalizes/displays adequate comfort level or baseline comfort level  12/31/2022 2147 by Hector Beltran, RN  Outcome: Progressing  Flowsheets (Taken 12/31/2022 1909)  Verbalizes/displays adequate comfort level or baseline comfort level:   Encourage patient to monitor pain and request assistance   Assess pain using appropriate pain scale    Problem: Discharge Planning  Goal: Discharge to home or other facility with appropriate resources  12/31/2022 2147 by Abdoul Prince, KWABENA  Outcome: Progressing  Flowsheets (Taken 12/31/2022 2000)  Discharge to home or other facility with appropriate resources: Identify barriers to discharge with patient and caregiver

## 2023-01-02 LAB
ANION GAP SERPL CALCULATED.3IONS-SCNC: 10 MMOL/L (ref 3–16)
BUN BLDV-MCNC: 17 MG/DL (ref 7–20)
CALCIUM SERPL-MCNC: 8.3 MG/DL (ref 8.3–10.6)
CHLORIDE BLD-SCNC: 101 MMOL/L (ref 99–110)
CO2: 26 MMOL/L (ref 21–32)
CREAT SERPL-MCNC: 1.3 MG/DL (ref 0.6–1.2)
GFR SERPL CREATININE-BSD FRML MDRD: 40 ML/MIN/{1.73_M2}
GLUCOSE BLD-MCNC: 82 MG/DL (ref 70–99)
HCT VFR BLD CALC: 23.7 % (ref 36–48)
HEMOGLOBIN: 7.6 G/DL (ref 12–16)
MCH RBC QN AUTO: 29.3 PG (ref 26–34)
MCHC RBC AUTO-ENTMCNC: 32 G/DL (ref 31–36)
MCV RBC AUTO: 91.6 FL (ref 80–100)
PDW BLD-RTO: 17.3 % (ref 12.4–15.4)
PLATELET # BLD: 116 K/UL (ref 135–450)
PMV BLD AUTO: 9.6 FL (ref 5–10.5)
POTASSIUM SERPL-SCNC: 4.2 MMOL/L (ref 3.5–5.1)
RBC # BLD: 2.59 M/UL (ref 4–5.2)
SODIUM BLD-SCNC: 137 MMOL/L (ref 136–145)
WBC # BLD: 4.6 K/UL (ref 4–11)

## 2023-01-02 PROCEDURE — 80048 BASIC METABOLIC PNL TOTAL CA: CPT

## 2023-01-02 PROCEDURE — 99232 SBSQ HOSP IP/OBS MODERATE 35: CPT | Performed by: NURSE PRACTITIONER

## 2023-01-02 PROCEDURE — 2580000003 HC RX 258

## 2023-01-02 PROCEDURE — 1200000000 HC SEMI PRIVATE

## 2023-01-02 PROCEDURE — 85027 COMPLETE CBC AUTOMATED: CPT

## 2023-01-02 PROCEDURE — 97116 GAIT TRAINING THERAPY: CPT

## 2023-01-02 PROCEDURE — 94761 N-INVAS EAR/PLS OXIMETRY MLT: CPT

## 2023-01-02 PROCEDURE — 36415 COLL VENOUS BLD VENIPUNCTURE: CPT

## 2023-01-02 PROCEDURE — 97530 THERAPEUTIC ACTIVITIES: CPT

## 2023-01-02 PROCEDURE — 6360000002 HC RX W HCPCS

## 2023-01-02 PROCEDURE — 6370000000 HC RX 637 (ALT 250 FOR IP): Performed by: NURSE PRACTITIONER

## 2023-01-02 PROCEDURE — 97110 THERAPEUTIC EXERCISES: CPT

## 2023-01-02 PROCEDURE — 6370000000 HC RX 637 (ALT 250 FOR IP)

## 2023-01-02 RX ORDER — LEVETIRACETAM 500 MG/1
500 TABLET ORAL 2 TIMES DAILY
Status: DISCONTINUED | OUTPATIENT
Start: 2023-01-02 | End: 2023-01-05 | Stop reason: HOSPADM

## 2023-01-02 RX ADMIN — MONTELUKAST 10 MG: 10 TABLET, FILM COATED ORAL at 09:05

## 2023-01-02 RX ADMIN — LEVETIRACETAM 500 MG: 100 INJECTION INTRAVENOUS at 06:07

## 2023-01-02 RX ADMIN — ALLOPURINOL 100 MG: 100 TABLET ORAL at 09:05

## 2023-01-02 RX ADMIN — LEVOTHYROXINE SODIUM 75 MCG: 0.07 TABLET ORAL at 06:03

## 2023-01-02 RX ADMIN — LEVETIRACETAM 500 MG: 500 TABLET, FILM COATED ORAL at 20:11

## 2023-01-02 RX ADMIN — AMIODARONE HYDROCHLORIDE 200 MG: 200 TABLET ORAL at 09:05

## 2023-01-02 RX ADMIN — ATORVASTATIN CALCIUM 10 MG: 10 TABLET, FILM COATED ORAL at 09:05

## 2023-01-02 RX ADMIN — TORSEMIDE 10 MG: 20 TABLET ORAL at 09:05

## 2023-01-02 ASSESSMENT — PAIN SCALES - GENERAL: PAINLEVEL_OUTOF10: 0

## 2023-01-02 NOTE — PROGRESS NOTES
Hospitalist Progress Note      PCP: Amol Lane    Date of Admission: 12/31/2022    Chief Complaint: fall    Subjective: patient has lightheadedness with movement, had bm this morning, no chest pain or other significant symptom. Medications:  Reviewed    Infusion Medications   Scheduled Medications    levETIRAcetam  500 mg Oral BID    allopurinol  100 mg Oral Daily    amiodarone  200 mg Oral Daily    levothyroxine  75 mcg Oral Daily    montelukast  10 mg Oral Daily    atorvastatin  10 mg Oral Daily    torsemide  10 mg Oral Daily    budesonide  0.5 mg Nebulization BID    And    Arformoterol Tartrate  15 mcg Nebulization BID     PRN Meds: ipratropium-albuterol, acetaminophen, acetaminophen      Intake/Output Summary (Last 24 hours) at 1/2/2023 1033  Last data filed at 1/2/2023 9865  Gross per 24 hour   Intake 1061.23 ml   Output 900 ml   Net 161.23 ml         Physical Exam Performed:    BP (!) 105/42   Pulse 56   Temp 98.4 °F (36.9 °C) (Oral)   Resp 21   Ht 5' 0.98\" (1.549 m)   Wt 172 lb 13.5 oz (78.4 kg)   SpO2 92%   BMI 32.68 kg/m²     General appearance: No apparent distress, appears stated age and cooperative. HEENT: bruises more at left side of the face, clear conjunctive with no bleeding. Respiratory:  Normal respiratory effort. Clear to auscultation, bilaterally without Rales/Wheezes/Rhonchi. Cardiovascular: Regular rate and rhythm   Abdomen: Soft, non-tender, non-distended with normal bowel sounds. Musculoskeletal: No clubbing, cyanosis or edema bilaterally. Full range of motion without deformity.   Psychiatric: Alert and oriented, thought content appropriate, normal insight    Labs:   Recent Labs     12/31/22  0745 01/01/23  0601 01/02/23  0916   WBC 4.0 7.9 4.6   HGB 8.2* 9.0* 7.6*   HCT 25.5* 28.1* 23.7*    160 116*       Recent Labs     12/31/22  0745 01/01/23  0601 01/02/23  0916    139 137   K 4.0 4.3 4.2    101 101   CO2 26 24 26   BUN 14 13 17 CREATININE 1.3* 1.3* 1.3*   CALCIUM 7.9* 8.9 8.3       Recent Labs     12/30/22 2017   AST 21   ALT 11   BILITOT 0.5   ALKPHOS 149*       Recent Labs     12/30/22 2017   INR 1.06       Recent Labs     12/30/22 2017 12/31/22  0745   TROPONINI 0.02* 0.01         Urinalysis:      Lab Results   Component Value Date/Time    NITRU Negative 12/30/2022 09:24 PM    BLOODU Negative 12/30/2022 09:24 PM    SPECGRAV 1.015 12/30/2022 09:24 PM    GLUCOSEU Negative 12/30/2022 09:24 PM       Radiology:  CT HEAD WO CONTRAST   Final Result      1. Stable left frontal intraparenchymal hematoma measuring 9 mm. 2.  Stable left subdural hematoma measuring up to 10 mm in thickness. 3.  Stable scattered foci of subarachnoid hemorrhage. 4.  Stable small left frontal scalp hematoma. CT HEAD WO CONTRAST   Final Result      New left frontal subdural hematoma measuring up to 10 mm in maximal thickness without significant mass effect or midline shift. Stable 9 mm intraparenchymal hematoma within the anterior left frontal lobe. Stable multi foci of subarachnoid hemorrhage. Mild chronic small vessel ischemic changes. Critical result called to the patient's nurse in the ICU at 11:11 AM 12/31/2022              Assessment/Plan:    Danielle Valadez 1106 Problems    Diagnosis     Intracranial hemorrhage ([facility] [I62.9]      Priority: Medium    KYARA (acute kidney injury) ([facility] [N17.9]      Priority: Medium    Normocytic anemia [D64.9]      Priority: Medium    Hypertension [I10]      Priority: Medium    Hyperlipidemia [E78.5]      Priority: Medium    COPD (chronic obstructive pulmonary disease) ([facility] [J44.9]      Priority: Medium     #. ICH: around 9 mm, left subdural hematoma of around 10 mm in thickness, stable scattered foci of subarachnoid hemorrhage secondary to mechanical fall. For ppx Keppra 500mg bid for total 2 weeks, keep bl pr less than 160.   Patient has aneurysm, will need vascular neurosurgery to follow as OP Charmayne Peper)    #. Syncope/Fall: she has history of A fib, now with bradycardia, she has been following with cardiology, has watchman devise, not on anticoagulation because of previous bleeding. She will follow up with cardiology as outpatient, she is stable for now. #. KYARA on top of CKD: creat stable today, follow up creat, not sure her baseline. #. Anemia: HGB 7,6 today, 9.2 on admission, not sure if she lost blood after her fall, it is normocytic. #. Elevated troponin: unlikely primary cardiac event, most likely from fall/ICH. #. HTN: keep less than 160 (it is actually at lower side now, only on amiodarone that can go down with blood pressure)    #. Hypothyroidism: home levothyroxine. #. COPD: stable, inhalers as needed. PT/OT to evaluate, if stable, patient can go to IPR once available.        Valente Brittle, MD

## 2023-01-02 NOTE — PROGRESS NOTES
NEUROLOGY / NEUROCRITICAL CARE PROGRESS NOTE       Patient Name: Rianna Bennett YOB: 1935   Sex: Female Age: 81 yrs     CC / Reason for Consult: traumatic SAH and SDH    Interval Hx / Changes over last 24 hours:   SLP recommending regular diet with thins  PT AM-PAC 16/24  OT AM-PAC 18/24  No adverse overnight events    ROS: wants to get cleaned up and up in the chair. Denies any new neurologic symptoms. HISTORY   Admission HPI:   Rianna Bennett is a 81 y.o. y/o female with PMH significant for HLD, HTN, lymphedema (BLE), frequent falls, a-fib, and osteoarthritis. Per my interview with the patient she was just finishing up putting her groceries in her car when she fell, lost consciousness, and was awakened by people in the parking lot calling EMS. Patient states she only takes a low dose ASA at home. Patient was taken to [facility] ED. CT head showed a 9mm hemorrhagic contusion in her L frontal lobe, and multifocal SAH in the L frontal L parietal lobes. 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 Multiple abrasions and lacerations are noted on the patient's head and leg in the ED. Patient transferred to [facility] ICU fur further evaluation and treatment. Of note: patient has been having frequent falls lately. Patient states she just loses her balance and will fall with no feelings of lightheadedness, dizziness, or weakness. PMH Past Medical History:   Diagnosis Date    Asthma     Cancer ([facility]     skin cancer basal and squamous    History of blood transfusion     Hyperlipidemia     Hypertension     Leg swelling     Osteoarthritis     UTI symptoms     Venous insufficiency of leg       Allergies Allergies   Allergen Reactions    Other      Pollen      Diet ADULT DIET;  Regular; Low Fat/Low Chol/High Fiber/2 gm Na   Isolation No active isolations     CURRENT SCHEDULED MEDICATIONS   Inpatient Medications allopurinol, 100 mg, Oral, Daily    amiodarone, 200 mg, Oral, Daily    levothyroxine, 75 mcg, Oral, Daily    montelukast, 10 mg, Oral, Daily    atorvastatin, 10 mg, Oral, Daily    torsemide, 10 mg, Oral, Daily    levETIRAcetam, 500 mg, IntraVENous, Q12H    budesonide, 0.5 mg, Nebulization, BID **AND** Arformoterol Tartrate, 15 mcg, Nebulization, BID                 LABS   Metabolic Panel Recent Labs     12/30/22 2017 12/31/22  0745 01/01/23  0601    137 139   K 3.9 4.0 4.3   CL 99 102 101   CO2 30 26 24   BUN 17 14 13   CREATININE 1.7* 1.3* 1.3*   GLUCOSE 108* 83 75   CALCIUM 9.1 7.9* 8.9   LABALBU 3.4  --   --    ALKPHOS 149*  --   --    ALT 11  --   --    AST 21  --   --         CBC / Coags Recent Labs     12/30/22 2017 12/31/22  0745 01/01/23  0601   WBC 3.7* 4.0 7.9   RBC 3.20* 2.81* 3.08*   HGB 9.2* 8.2* 9.0*   HCT 29.0* 25.5* 28.1*    162 160   INR 1.06  --   --               DIAGNOSTICS   IMAGES:  Images personally reviewed and agree w/ radiology interpretation. Head CT w/o Contrast:  1. Stable left frontal intraparenchymal hematoma measuring 9 mm   2. Stable left subdural hematoma measuring up to 10 mm in thickness. 3.  Stable scattered foci of subarachnoid hemorrhage. 4.  Stable small left frontal scalp hematoma. CTA of Head / Neck w/ Contrast:  No flow-limiting arterial stenosis in the head and neck. 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 Follow-up catheter angiogram is recommended for complete assessment.      PHYSICAL EXAMINATION     PHYSICAL EXAM:  Vitals:    01/02/23 0500 01/02/23 0600 01/02/23 0802 01/02/23 0837   BP: (!) 105/42 (!) 110/46 (!) 105/42    Pulse: 57 61 58 61   Resp: 18 19 17 16   Temp:  98.7 °F (37.1 °C) 98.4 °F (36.9 °C)    TempSrc:   Oral    SpO2: 95% 92% 92% 91%   Weight:  172 lb 13.5 oz (78.4 kg)           General: Alert, no distress, well-nourished  Neurologic  Mental status:   orientation to person, place, time, situation   Attention intact as able to attend well to the exam     Language fluent in conversation   Comprehension intact; follows simple commands    Cranial nerves:   CN2: Visual fields full w/o extinction on confrontational testing   CN 3,4,6: Pupils equal and reactive to light, extraocular muscles intact  CN5: Facial sensation symmetric   CN7: Face symmetric bilaterally   CN8: Hearing symmetric to spoken voice  CN9: Palate elevated symmetrically  CN11: Traps full strength on shoulder shrug  CN12: Tongue midline with protrusion    Motor Exam:   R  L    Deltoid 5  5   Biceps 5 5   Triceps 5 5   Wrist extension  5 5    5 5      R  L    Hip flexion  5  5   Knee flexion  5 5   Knee extension  5 5   Ankle dorsiflexion  5 5   Ankle plantar flexion  5 5         OTHER SYSTEMS:  Cardiovascular: Warm, appears well perfused  Respiratory: Easy, non-labored respiratory pattern   Abdominal: Abdomen is without distention   Extremities: Upper and lower extremities are atraumatic in appearance without deformity. No swelling or erythema. HEENT: Bruising to left side of forehead and face     ASSESSMENT & RECOMMENDATIONS   Assessment:  Ameena Martinez is an 82 yo female who presents with a traumatic, hemorrhagic contusion and SAH s/p a fall with LOC, she developed left sided SDH over the course of hospitalization with no change in her neurologic exam. 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 Plan:  - Q4H neuro checks and vital signs   - Keep SBP less than 160 mmHg  - Keep Platelets ? 100K, Keep INR ? 1.4   - Advance activity as tolerated  - ST for swallow eval, ok for regular diet  - PT/OT for dizziness and imbalance. Consult rehab if needed. - Continue prophylactic keppra 500mg PO BID for 14 days  - Hold antiplts / full dose anticoagulants x 2 weeks  - Headaches: Tylenol 650mg PO Q6H PRN  - Dizziness: valium 2.5mg PO TID PRN  - Nausea: Zofran 4mg IV Q6H PRN.  Consider scopolamine patch for persistent nausea  - Will require 24 hour supervision for first 48 hours after discharge  - F/U with Dr. Ann Marie Fuentes with neurovascular surgery regarding likely incidental aneurysms. D/w NP Luther Patiño    Neurology/Neurocritical care will sign off. OK to d/c to IPR when otherwise ready. NATALIE Rodriguez - CNP   Neurology & Neurocritical Care   1/2/2023 8:54 AM    Floor / PCU Patients:  Neurology Line: [phone]  [facility]: [facility] Neurology    I spent 26 minutes in the care of this patient. Over 50% of that time was in face-to-face counseling regarding disease process, diagnostic testing, preventative measures, and answering patient and family questions.

## 2023-01-02 NOTE — PROGRESS NOTES
Physical Therapy  Daily Treatment Note    Discharge Recommendations: Albert Guardado scored a 16/24 on the AM-PAC short mobility form. Current research shows that an AM-PAC score of 17 or less is typically not associated with a discharge to the patient's home setting. Based on the patient's AM-PAC score and their current functional mobility deficits, it is recommended that the patient have 5-7 sessions per week of Physical Therapy at d/c to increase the patient's independence. At this time, this patient demonstrates complex nursing, medical, and rehabilitative needs, and would benefit from intensive rehabilitation services upon discharge from the Inpatient setting. This patient demonstrates the ability to participate in and benefit from an intensive therapy program with a coordinated interdisciplinary team approach to foster frequent, structured, and documented communication among disciplines, who will work together to establish, prioritize, and achieve treatment goals. Please see assessment section for further patient specific details. Assessment:  Pt with somewhat improved activity tolerance this session. Mobility weak, slow and guarded. Needing up to Marene Pyo assist for transfers and CGA for ambulation with wheeled walker. Bed mobility has been difficult due to L rib pain. Pt from home alone with history of recent fall with injury. She is currently functioning below her baseline for mobility. Needing cues for safety with walker. Limited by weakness, pain, decreased balance. Would benefit from continued IP PT at D/C prior to returning home. Equipment Needs: Defer to next level of care    Chart Reviewed: Yes     Other position/activity restrictions: no activity restrictions noted   Additional Pertinent Hx: Patient is an 82 y/o female admitted 12/31 s/p fall. CT head (+) forNew left frontal subdural hematoma measuring up to 10 mm in maximal thickness without significant mass effect or midline shift. Stable 9 mm intraparenchymal hematoma within the anterior left frontal lobe. Stable multi foci of subarachnoid hemorrhage. - all stable on repeat exam.      Diagnosis: ICH   Treatment Diagnosis: impaired mobility associated with ICH    Subjective: Pt in chair initially. Agreeable to working with PT. Pain: c/o \"mild headache\" and L \"rib pain\". Tolerable. RN aware. Objective:    Transfers  Sit to stand: CGA from chair (twice); Min assist from raised height commode   Stand to sit:  CGA into chair (twice); CGA onto raised height commode   Other: Cues for hand placement (pushing up/reaching back for sitting surface as opposed to holding onto walker) and for turning/backing up safely with walker. Ambulation  Assistance Level: CGA  Assistive device: Wheeled walker  Distance: 10 ft, 4 ft in room (to commode/sink), 50 ft in ghosh  Quality of gait: Decreased pace; weak, but no overt LOB; mildly flexed posture; cautious/guarded    Exercises  While seated upright in chair:   15 reps B heel/toe raises, LAQ, hip abd/add  10 reps B hip flexion (min assist on R)  Other: Rest breaks between sets. Balance  Sat on raised height commode with SBA  Static stance with wheeled walker CGA  Ambulation with wheeled walker CGA  Stood at sink to wash/dry hands with CGA    Patient Education  Role of PT. Calling for assist with needs. Expressed understanding. Safe hand placement with transfers when using walker. Turning/backing up safely with walker prior to sitting. Needs continued reinforcement/practice. Safety Devices  Pt left with needs in reach. In chair (reclined) with chair alarm on. RN updated.      AM-PAC score  AM-PAC Inpatient Mobility Raw Score : 16  AM-PAC Inpatient T-Scale Score : 40.78  Mobility Inpatient CMS 0-100% Score: 54.16  Mobility Inpatient CMS G-Code Modifier : CK    Goals: (as determined and assessed by primary PT)  Time Frame for Short Term Goals: discharge  Short Term Goal 1: patient will perform bed mobility with SBA   Short Term Goal 2: patient will perform transfers sit<>stand with supervision   Short Term Goal 3: patient will ambulate 48' with FWW and supervision          Plan:  Plan: 5-7 times per week  Current Treatment Recommendations: Strengthening, Balance training, Gait training, Functional mobility training, Transfer training, Endurance training, Patient/Caregiver education & training, Therapeutic activities, Safety education & training    Therapy Time    Individual  Concurrent  Group  Co-treatment    Time In  1238            Time Out  1318            Minutes  40              Timed Code Treatment Minutes: 40  Total Treatment Minutes: 40    Will continue per plan of care. If patient is discharged prior to next treatment, this note will serve as the discharge summary.     Helen [place] #[id number]

## 2023-01-02 NOTE — PROGRESS NOTES
Clinical Pharmacy Progress Note    IVs in NS - Management by Pharmacy    Consult Date(s): 12/31/22  Consulting Provider(s): Dr. Elif Ibrahim / Plan  1)  SAH - All IVs in Normal Saline  Drips will be adjusted to normal saline as appropriate based on compatibility, in an effort to avoid fluid shifts, as D5W is osmotically active. The following intermittent IV drips / infusions have been adjusted to saline:  Levetiracetam  If ordered, the following medications must remain in D5W due to incompatibility with normal saline:  None at this time  Note: Patient may have dextrose ordered as part of hypoglycemia treatment protocol. Total IV fluid delivered to patient over last 24 hrs: ~220ml  Pharmacist will follow daily to ensure all IVPBs / drips are in NS where possible.     Kaylin Stanton, PharmD, [facility]  [id number]    1/2/2023 12:51 PM

## 2023-01-02 NOTE — PROGRESS NOTES
Comprehensive Nutrition Assessment    RECOMMENDATIONS:  PO Diet: Continue Regular; low fat/low chol/high fiber/2g Na diet  ONS: Pt declined  Nutrition Education: Education not appropriate   Monitor nutrition adequacy, pertinent labs, bowel habits, wt changes, and clinical progress    NUTRITION ASSESSMENT:   Nutritional summary & status: Positive nutrition screen: Pt on Regular; low fat/low chol/high fiber/2g Na diet. Pt has had 15% wt loss over the past 9 months and reports a decreased appetite/intake. Pt states that she had COVID in February which resulted in altered taste. She has mostly gotten her taste back and her intakes have improved. Observed pt had eaten 100% of breakfast, no c/o N/V or abdominal pain. Pt does not feel that she needs an ONS at this point. Will continue to monitor. Admission/PMH: Intracranial hemorrhage, COPD, HTN, KYARA    MALNUTRITION ASSESSMENT  Context of Malnutrition: Chronic Illness   Malnutrition Status: Moderate malnutrition  Findings of the 6 clinical characteristics of malnutrition (Minimum of 2 out of 6 clinical characteristics is required to make the diagnosis of moderate or severe Protein Calorie Malnutrition based on AND/ASPEN Guidelines):  Energy Intake:  75% or less estimated energy requirements for 1 month or longer  Weight Loss:  Mild weight loss (specify amount and time period) (15% over 9 months)     Body Fat Loss:  No significant body fat loss     Muscle Mass Loss:  No significant muscle mass loss    Fluid Accumulation:  Mild        NUTRITION DIAGNOSIS   Inadequate energy intake related to altered taste perception as evidenced by poor intake prior to admission, weight loss    Nutrition Monitoring and Evaluation:   Food/Nutrient Intake Outcomes:  Food and Nutrient Intake  Physical Signs/Symptoms Outcomes:  Biochemical Data, Nutrition Focused Physical Findings, Weight     OBJECTIVE DATA: Significant to nutrition assessment  Nutrition Related Findings: Labs reviewed.  Active bowel sounds. +BM 1/1. +2 RLE/LLE edema. Wounds: Open Wounds  Nutrition Goals: PO intake 75% or greater, prior to discharge     [address] DIET; Regular; Low Fat/Low Chol/High Fiber/2 gm Na  PO Intake: %   PO Supplement Intake:None Ordered  Additional Sources of Calories/IVF:N/A     ANTHROPOMETRICS  Current Height: 5' 0.98\" (154.9 cm)  Current Weight: 172 lb 13.5 oz (78.4 kg)    Admission weight: 174 lb 2.6 oz (79 kg)  Ideal Body Weight (IBW): 105 lbs  (48 kg)    BMI: 0    COMPARATIVE STANDARDS  Energy (kcal):  4647-1789 (15-20 kcal/kg CBW)     Protein (g):  63-78 (0.8-1.0 g/kg CBW)       Fluid (mL/day):  1 mL/kcal or per MD    The patient will be monitored per nutrition standards of care. Consult dietitian if additional nutrition interventions are needed prior to RD reassessment.      Mildred Pate, [address], [address] [place] [address] Drive:  [phone]  Office:  [phone]

## 2023-01-03 LAB
ANION GAP SERPL CALCULATED.3IONS-SCNC: 8 MMOL/L (ref 3–16)
BUN BLDV-MCNC: 18 MG/DL (ref 7–20)
CALCIUM SERPL-MCNC: 8.1 MG/DL (ref 8.3–10.6)
CHLORIDE BLD-SCNC: 100 MMOL/L (ref 99–110)
CO2: 27 MMOL/L (ref 21–32)
CREAT SERPL-MCNC: 1.5 MG/DL (ref 0.6–1.2)
GFR SERPL CREATININE-BSD FRML MDRD: 34 ML/MIN/{1.73_M2}
GLUCOSE BLD-MCNC: 86 MG/DL (ref 70–99)
HCT VFR BLD CALC: 23.4 % (ref 36–48)
HEMOGLOBIN: 7.6 G/DL (ref 12–16)
MCH RBC QN AUTO: 29.4 PG (ref 26–34)
MCHC RBC AUTO-ENTMCNC: 32.3 G/DL (ref 31–36)
MCV RBC AUTO: 91 FL (ref 80–100)
PDW BLD-RTO: 17.4 % (ref 12.4–15.4)
PLATELET # BLD: 116 K/UL (ref 135–450)
PMV BLD AUTO: 9.8 FL (ref 5–10.5)
POTASSIUM SERPL-SCNC: 3.9 MMOL/L (ref 3.5–5.1)
RBC # BLD: 2.58 M/UL (ref 4–5.2)
SODIUM BLD-SCNC: 135 MMOL/L (ref 136–145)
WBC # BLD: 3.9 K/UL (ref 4–11)

## 2023-01-03 PROCEDURE — 6370000000 HC RX 637 (ALT 250 FOR IP): Performed by: NURSE PRACTITIONER

## 2023-01-03 PROCEDURE — 1200000000 HC SEMI PRIVATE

## 2023-01-03 PROCEDURE — 6370000000 HC RX 637 (ALT 250 FOR IP)

## 2023-01-03 PROCEDURE — 92526 ORAL FUNCTION THERAPY: CPT

## 2023-01-03 PROCEDURE — 94761 N-INVAS EAR/PLS OXIMETRY MLT: CPT

## 2023-01-03 PROCEDURE — 85027 COMPLETE CBC AUTOMATED: CPT

## 2023-01-03 PROCEDURE — 80048 BASIC METABOLIC PNL TOTAL CA: CPT

## 2023-01-03 PROCEDURE — 99223 1ST HOSP IP/OBS HIGH 75: CPT | Performed by: PHYSICAL MEDICINE & REHABILITATION

## 2023-01-03 PROCEDURE — 97535 SELF CARE MNGMENT TRAINING: CPT

## 2023-01-03 PROCEDURE — 51798 US URINE CAPACITY MEASURE: CPT

## 2023-01-03 PROCEDURE — 36415 COLL VENOUS BLD VENIPUNCTURE: CPT

## 2023-01-03 RX ADMIN — MONTELUKAST 10 MG: 10 TABLET, FILM COATED ORAL at 08:44

## 2023-01-03 RX ADMIN — LEVOTHYROXINE SODIUM 75 MCG: 0.07 TABLET ORAL at 06:23

## 2023-01-03 RX ADMIN — LEVETIRACETAM 500 MG: 500 TABLET, FILM COATED ORAL at 08:44

## 2023-01-03 RX ADMIN — ATORVASTATIN CALCIUM 10 MG: 10 TABLET, FILM COATED ORAL at 08:44

## 2023-01-03 RX ADMIN — ALLOPURINOL 100 MG: 100 TABLET ORAL at 08:44

## 2023-01-03 RX ADMIN — LEVETIRACETAM 500 MG: 500 TABLET, FILM COATED ORAL at 21:14

## 2023-01-03 RX ADMIN — TORSEMIDE 10 MG: 20 TABLET ORAL at 08:44

## 2023-01-03 ASSESSMENT — PAIN SCALES - GENERAL
PAINLEVEL_OUTOF10: 0
PAINLEVEL_OUTOF10: 0

## 2023-01-03 NOTE — PROGRESS NOTES
Speech Language Pathology  Facility/Department: [facility] in [place] ICU  Dysphagia Daily Treatment Note  Discharge     NAME: Catalina Kidd  : 1935  MRN: [id number]    Patient Diagnosis(es):   Patient Active Problem List    Diagnosis Date Noted    Acute intra-cranial hemorrhage ([address] 2022    Intracranial hemorrhage ([address] 2022    KYARA (acute kidney injury) ([address] 2022    Normocytic anemia 2022    Hypertension 2022    Hyperlipidemia 2022    COPD (chronic obstructive pulmonary disease) ([address] 2022    H/O total hysterectomy with bilateral salpingo-oophorectomy (BSO) 2016    Vaginal atrophy 2015    History of endometrial cancer 2015    Frequent UTI 2015     Allergies: Allergies   Allergen Reactions    Other      Pollen       Recent Chest Xray 22      1. Cardiomegaly without evidence of CHF. 2. No confluent airspace consolidation. CT of head 22  Impression       1. Stable left frontal intraparenchymal hematoma measuring 9 mm. 2.  Stable left subdural hematoma measuring up to 10 mm in thickness. 3.  Stable scattered foci of subarachnoid hemorrhage. 4.  Stable small left frontal scalp hematoma. Chart reviewed. Medical Diagnosis: Intracranial hemorrhage ([address] [I62.9]   Treatment Diagnosis:dysphagia     BSE Impression 23  Pt was alert and oriented x3. Speech is clear with no instances of word finding difficulty. Pt able to follow commands and respond to questions appropriately. ROM of oral structures was [facility] [place]. Pt had no difficulty closing lips around spoon or drawing liquid up a straw. Pt had no difficulty with mastication with solids. There was no anterior spillage or oral residue noted with any consistency. Pt demonstrated no s/s aspiration with any consistency presented. Pt able to initiate swallow without difficulty with laryngeal movement observed. Vocal quality remained clear throughout.   No coughing, throat clearing or choking observed with any consistency. Pt consumed 3oz water uninterrupted by cup/straw without difficulty. Given pt's age and results of MRI, will see 1-3x. Dysphagia Diagnosis: Swallow function appears WFL, No clinical indicators of dysphagia    MBS results - not indicated     Pain: denied pain     Current Diet : ADULT DIET; Regular; Low Fat/Low Chol/High Fiber/2 gm Na   Recommended Form of Meds: PO  Compensatory Swallowing Strategies : Upright as possible for all oral intake     Treatment:  Pt seen bedside to address the following goals:  1- The patient will tolerate recommended diet without observed clinical signs of aspiration   1/3-goal met- RN reported no concerns re: swallowing or lung status. Pt reported no difficulty with swallowing. Pt analyzed with trials of water by straw and cracker. Pt had no difficulty with mastication. No anterior spillage or oral residue noted. Pt displayed no s/s aspiration with any consistency, even when consumed successive swallows of water by straw. 2- The pt/caregiver will demonstrate understanding of swallowing recommendations and concerns. 1/1-  The pt was educated to purpose of the visit, anatomy and physiology of the swallow, s/s of  aspiration, swallowing strategies, diet recommendations and possibility of being made NPO if s/s aspiration emerge. The pt stated comprehension. con't goal   1/3- goal met- pt educated to the purpose of the visit , swallowing strategies and plan to discharge from Speech Therapy. Patient/Family/Caregiver Education:  .see above    Compensatory Strategies:  Compensatory Swallowing Strategies : Upright as possible for all oral intake      Plan:  Pt discharged from Speech Therapy services. Pt met all goals for therapy.     Diet recommendations: Regular with thin liquids-Make NPO if s/s aspiration emerge and alert SLP  DC recommendation:No further follow up needed unless s/s aspiration emerge, make pt NPO and re-refer. Treatment: 15  D/W nursing, Marcin  Needs met prior to leaving room, call button in reach.     Rafa Hatfield, [address]  Speech-Language Pathologist  Pager [phone]      If patient is discharged prior to next treatment, this note will serve as the discharge summary

## 2023-01-03 NOTE — PROGRESS NOTES
Pt's exam remains stable. Pt able to walk short distance in ghosh with RN with minimal assistance. Pt using call light appropriately.      Vandana Cruz RN

## 2023-01-03 NOTE — CARE COORDINATION
Case Management Assessment  Initial Evaluation    Date/Time of Evaluation: 1/3/2023 2:06 PM  Assessment Completed by: Yon Davey RN    If patient is discharged prior to next notation, then this note serves as note for discharge by case management. Patient Name: Yvonne Lopez                   YOB: 1935  Diagnosis: Intracranial hemorrhage ([facility] [I62.9]                   Date / Time: 12/31/2022  6:04 AM    Patient Admission Status: Inpatient   Readmission Risk (Low < 19, Mod (19-27), High > 27): Readmission Risk Score: 14    Current PCP: Digna Hutton  PCP verified by CM? Yes [facility])    Chart Reviewed: Yes      History Provided by: Patient, Medical Record  Patient Orientation: Alert and Oriented    Patient Cognition: Alert    Hospitalization in the last 30 days (Readmission):  No    If yes, Readmission Assessment in  Navigator will be completed. Advance Directives:      Code Status: Full Code   Patient's Primary Decision Maker is: Patient Declined (Legal Next of Kin Remains as Decision Maker)      Discharge Planning:    Patient lives with: Alone Type of Home: Trailer/Mobile Home (ramped entrance)  Primary Care Giver: Self  Patient Support Systems include: Family Members, PARISH/Passport (COA aide services 3h 1 time a week)   Current Financial resources: Medicare  Current community resources:  Other (Comment) (active with [place] on Aging for aide services)  Current services prior to admission: Other (Comment) (COA aide 3h once a week)            Current DME:              Type of Home Care services:  None    ADLS  Prior functional level: Assistance with the following:, Housework  Current functional level: Assistance with the following:, Bathing, Housework, Cooking, Shopping, Mobility, Dressing, Toileting    PT AM-PAC: 16 /24  OT AM-PAC: 18 /24    Family can provide assistance at DC: No  Would you like Case Management to discuss the discharge plan with any other family members/significant others, and if so, who? Yes (son, Shukri Chambers or daughter, Rita Rogers)  Plans to Return to Present Housing: Yes  Potential Assistance needed at discharge: Other (Comment) (ARU placement)            Potential DME:  deferred  Patient expects to discharge to: Acute rehab  Plan for transportation at discharge:  tbd    Financial    Payor: Justine Michelle / Plan: Marcus Garzon PPO / Product Type: Medicare /     Does insurance require precert for SNF: Yes    Potential assistance Purchasing Medications: No  Meds-to-Beds request:        [id number] [address] [facility] [phone]  [address]  Phone: [phone] Fax: [phone]    Moira Alfaro [address] [place] [address]. Dina Aguilar [phone] - [address]. [facility] [address]  Phone: [phone] Fax: [phone]      Discharge Plan?       Ashutosh Kirby RN  Case Management Department  [phone] no dysuria/no urinary hesitancy/no hematuria/normal urinary frequency/no flank pain R/no flank pain L

## 2023-01-03 NOTE — PLAN OF CARE
NEUROSURGERY PLAN OF CARE NOTE    Himanshu Allison  [id number]   1935   1/3/2023    Radiological Findings:  CT HEAD WO CONTRAST  Result Date: 12/30/2022  1. 9 mm hemorrhagic contusion in the left frontal lobe and multifocal left subarachnoid hemorrhage with hemorrhage located medially and laterally in the frontal lobe region and in the left parietal region. 2. High attenuation along the course of the right middle and anterior cerebral arteries which could represent subarachnoid hemorrhage in this region. Thrombosis of these arteries could have a similar appearance. CT angiography could be considered for further evaluation. 3. Mucosal thickening in the left maxillary sinus. CTA HEAD NECK W CONTRAST  Result Date: 12/30/2022  No flow-limiting arterial stenosis in the head and neck. 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 Follow-up catheter angiogram is recommended for complete assessment. CT CERVICAL SPINE WO CONTRAST  Result Date: 12/30/2022  Moderate degenerative disc changes throughout the cervical spine with mild subluxation of C3 on C4 and C7 on T1 which is probably degenerative in etiology with no obvious fracture seen at these levels. If there is clinical concern for ligamentous instability, suggest MRI correlation or flexion-extension views for correlation. Small disc osteophyte complex and mild subluxation at C3-4 causing moderate dural sac effacement. Moderate disc osteophyte complexes at C4-5, C5-6, and C6-7 causing moderate dural sac effacement throughout Questionable subchondral cyst or erosion along the odontoid process posteriorly which could be due to rheumatoid disease     CT HEAD WO CONTRAST  Result Date: 12/31/2022  New left frontal subdural hematoma measuring up to 10 mm in maximal thickness without significant mass effect or midline shift. Stable 9 mm intraparenchymal hematoma within the anterior left frontal lobe.  Stable multi foci of subarachnoid hemorrhage. Mild chronic small vessel ischemic changes. CT HEAD WO CONTRAST  Result Date: 12/31/2022  1. Stable left frontal intraparenchymal hematoma measuring 9 mm. 2.  Stable left subdural hematoma measuring up to 10 mm in thickness. 3.  Stable scattered foci of subarachnoid hemorrhage. 4.  Stable small left frontal scalp hematoma. Assessment:  Patient is a 81 y.o. y/o female w/a traumatic subdural hematoma, subarachnoid hemorrhage, and hemorrhagic contusion s/p fall with LOC. 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      Recommendations:  Neurologic exams frequency: Defer to Neurocritical Care  F/U with Dr. Elena Martinez with neurovascular surgery regarding incidental aneurysms    DISPO: Per Primary Team     Electronically signed by: NATALIE Pyle CNP, NATALIE-CNP, 1/3/2023 11:29 AM  [phone]

## 2023-01-03 NOTE — CONSULTS
Consult Note  Physical Medicine and Rehabilitation    Patient: Harvest Moritz  [id number]  Date: 1/3/2023      Chief Complaint: fall    History of Present Illness/Hospital Course:  Ranelle Felty is a 56-year-old female with PMHx of Afib (s/p watchman device), COPD, HTN and multiple AVM who came in with multiple falls in the past week. She was putting groceries in the car and the next thing she remembers is sitting in the chair. She was told that she fell and hit her head on the pole of a handicap sign. 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 Neurosurgery was consulted and was transferred to [facility] ICU. Prior Level of Function:  Independent for mobility, ADLs, and IADLs. Current Level of Function:  Moderate    Pertinent Social History:  Support: None      Past Medical History:   Diagnosis Date    Asthma     Cancer ([facility]     skin cancer basal and squamous    History of blood transfusion     Hyperlipidemia     Hypertension     Leg swelling     Osteoarthritis     UTI symptoms     Venous insufficiency of leg        Past Surgical History:   Procedure Laterality Date    APPENDECTOMY  1969    CAPSULE ENDOSCOPY N/A 4/1/2022    CAPSULE ENDOSCOPY performed by Mey Ely MD at [facility] (CERVIX STATUS UNKNOWN)      JOINT REPLACEMENT Bilateral     bilateral TKR    NASAL SEPTUM SURGERY      MD ARTHRP KNE CONDYLE&PLATU MEDIAL&LAT COMPARTMENTS Right 2000    knee surgery    MD DILATION & CURETTAGE DX&/THER NONOBSTETRIC      D & C    MD VAGINAL HYSTERECTOMY UTERUS 250 GM/<  2004    Hysterectomy    TONSILLECTOMY      VEIN SURGERY      legs       Family History   Problem Relation Age of Onset    Hypertension Mother     Obesity Mother     Alzheimer's Disease Father        Social History     Socioeconomic History    Marital status:       Spouse name: None Number of children: None    Years of education: None    Highest education level: None   Tobacco Use    Smoking status: Former    Smokeless tobacco: Never   Substance and Sexual Activity    Alcohol use: Yes     Alcohol/week: 0.0 standard drinks     Comment: occ    Drug use: No           REVIEW OF SYSTEMS:   CONSTITUTIONAL: negative for fevers, chills, diaphoresis, appetite change, night sweats, unexpected weight change, fatigue  EYES: negative for blurred vision, eye discharge, visual disturbance and icterus  HEENT: negative for hearing loss, tinnitus, ear drainage, sinus pressure, nasal congestion, epistaxis and snoring  RESPIRATORY: Negative for hemoptysis, cough, sputum production  CARDIOVASCULAR: negative for chest pain, palpitations, exertional chest pressure/discomfort, syncope, edema  GASTROINTESTINAL: negative for nausea, vomiting, diarrhea, blood in stool, abdominal pain, constipation  GENITOURINARY: negative for frequency, dysuria, urinary incontinence, decreased urine volume, and hematuria  HEMATOLOGIC/LYMPHATIC: negative for easy bruising, bleeding and lymphadenopathy  ALLERGIC/IMMUNOLOGIC: negative for recurrent infections, angioedema, anaphylaxis and drug reactions  ENDOCRINE: negative for weight changes and diabetic symptoms including polyuria, polydipsia and polyphagia  MUSCULOSKELETAL: negative for pain, joint swelling, decreased range of motion  NEUROLOGICAL: Positive for light-headedness and headaches. Negative for  slurred speech, unilateral weakness  PSYCHIATRIC/BEHAVIORAL: negative for hallucinations, behavioral problems, confusion and agitation.      Physical Examination:  Vitals: Patient Vitals for the past 24 hrs:   BP Temp Temp src Pulse Resp SpO2 Weight   01/03/23 0859 -- -- -- 56 17 95 % --   01/03/23 0700 (!) 104/41 -- -- (!) 49 19 95 % --   01/03/23 0600 (!) 98/42 -- -- 51 17 98 % 176 lb 9.4 oz (80.1 kg)   01/03/23 0500 (!) 109/46 -- -- 53 15 99 % --   01/03/23 0400 (!) 113/41 97 °F (36.1 °C) Axillary 53 16 98 % --   01/03/23 0339 -- -- -- 53 -- -- --   01/03/23 0300 (!) 95/58 -- -- 52 15 100 % --   01/03/23 0200 (!) 104/41 -- -- 52 16 100 % --   01/03/23 0100 (!) 85/31 -- -- 56 19 99 % --   01/03/23 0000 (!) 109/42 97 °F (36.1 °C) Axillary 61 18 100 % --   01/02/23 2300 (!) 110/37 -- -- 60 11 (!) 88 % --   01/02/23 2230 (!) 110/33 -- -- 59 20 92 % --   01/02/23 2215 -- -- -- 56 24 91 % --   01/02/23 2200 (!) 104/34 -- -- 55 20 91 % --   01/02/23 2145 -- -- -- 54 20 94 % --   01/02/23 2130 (!) 118/41 -- -- 54 21 94 % --   01/02/23 2115 82/67 -- -- 55 18 94 % --   01/02/23 2100 (!) 108/51 -- -- 56 21 95 % --   01/02/23 2045 (!) 112/42 -- -- 55 19 94 % --   01/02/23 2030 (!) 108/36 -- -- 55 22 94 % --   01/02/23 2015 (!) 106/41 -- -- 54 16 (!) 86 % --   01/02/23 2000 (!) 100/35 97.6 °F (36.4 °C) Axillary 55 18 96 % --   01/02/23 1800 -- -- -- 54 15 -- --   01/02/23 1555 (!) 103/51 97.8 °F (36.6 °C) Oral 58 19 100 % --   01/02/23 1400 -- -- -- 54 13 -- --   01/02/23 1156 -- -- -- 53 15 -- --   01/02/23 1105 (!) 96/39 98.1 °F (36.7 °C) Oral 53 12 97 % --   01/02/23 1000 -- -- -- 56 21 92 % --     Const: Alert. WDWN. No distress  Eyes: Conjunctiva noninjected, no icterus noted; pupils equal, round, and reactive to light. HENT: Atraumatic, normocephalic; Oral mucosa moist  Neck: Trachea midline, neck supple. No thyromegaly noted. CV: Regular rate and rhythm, no murmur rub or gallop noted  Resp: Lungs clear to auscultation bilaterally, no rales wheezes or ronchi, no retractions. Respirations unlabored. GI: Soft, nontender, nondistended. Normal bowel sounds. No palpable masses. Skin: Normal temperature and turgor. No rashes or breakdown noted. Ext: Bilateral pitting edema 1+. No varicosities. MSK: No joint tenderness, erythema, warmth noted. AROM intact. Neuro: Alert, oriented, appropriate. No cranial nerve deficits appreciated. Sensation intact to light touch.  Motor examination reveals normal strength in all four limbs diffusely. No abnormalities with finger/nose or heel/shin noted. Reflexes normal and symmetric. Psych: Stable mood, normal judgement, normal affect     Lab Results   Component Value Date    WBC 3.9 (L) 01/03/2023    HGB 7.6 (L) 01/03/2023    HCT 23.4 (L) 01/03/2023    MCV 91.0 01/03/2023     (L) 01/03/2023     Lab Results   Component Value Date    INR 1.06 12/30/2022    PROTIME 13.7 12/30/2022     Lab Results   Component Value Date    CREATININE 1.5 (H) 01/03/2023    BUN 18 01/03/2023     (L) 01/03/2023    K 3.9 01/03/2023     01/03/2023    CO2 27 01/03/2023     Lab Results   Component Value Date    ALT 11 12/30/2022    AST 21 12/30/2022    ALKPHOS 149 (H) 12/30/2022    BILITOT 0.5 12/30/2022       CT HEAD WO CONTRAST   Final Result      1. Stable left frontal intraparenchymal hematoma measuring 9 mm. 2.  Stable left subdural hematoma measuring up to 10 mm in thickness. 3.  Stable scattered foci of subarachnoid hemorrhage. 4.  Stable small left frontal scalp hematoma. CT HEAD WO CONTRAST   Final Result      New left frontal subdural hematoma measuring up to 10 mm in maximal thickness without significant mass effect or midline shift. Stable 9 mm intraparenchymal hematoma within the anterior left frontal lobe. Stable multi foci of subarachnoid hemorrhage. Mild chronic small vessel ischemic changes. Critical result called to the patient's nurse in the ICU at 11:11 AM 12/31/2022            Assessment:    1. Intracranial hemorrhage   - 9 mm left subdural hematoma with scattered foci of SAH 2/2 mechanical fall  - Keppra 500 mg BID for seizure ppx  - Neurochecks Q4H  - PT/OT  - SBP goal <160   - Neurology & NSGY following   2. Hypertension  - SBP goal <160  - PRN in place   3. Normocytic anemia 2/2 fall  - Hgb 7.6 with 9.2 on admission; continue to monitor   4.  Hypothyroidism   - Continue home Synthroid     PT/OT: 16/18    Impairments - Decreased functional mobility, Decreased ADLs    Recommendations:    ARU appropriate; start pre-cert    Thank you for this consult. Please contact me with any questions or concerns. Will discuss with attending Dr. Luigi Kearns MD  Internal Medicine PGY-3  1/3/2023  9:53 AM        This patient has been seen, examined, and discussed with the resident. I was part of the key critical services provided to the patient. I agree with the residents documentation. This note may have been altered to reflect my own examination findings, impression, and recommendations.        Dann Pretty D.O. M.P.H  PM&R  1/3/2023  11:52 AM

## 2023-01-03 NOTE — PROGRESS NOTES
Clinical Pharmacy Progress Note    IVs in NS - Management by Pharmacy    Consult Date(s): 12/31/22  Consulting Provider(s): Dr. Rosa Jacobson / Plan  1)  SAH - All IVs in Normal Saline  Drips will be adjusted to normal saline as appropriate based on compatibility, in an effort to avoid fluid shifts, as D5W is osmotically active. The following intermittent IV drips / infusions have been adjusted to saline:  Levetiracetam  If ordered, the following medications must remain in D5W due to incompatibility with normal saline:  None at this time  Note: Patient may have dextrose ordered as part of hypoglycemia treatment protocol. Total IV fluid delivered to patient over last 24 hrs: < 50 mL  As patient is not being treated for pituitary tumor resection or requiring hypertonic saline (defined as >0.9% NaCl), pharmacy will sign off of IV review consult, per protocol.         Sanju Iglesias PharmD., BCPS   1/3/2023 1:07 PM  Wireless: [phone]

## 2023-01-03 NOTE — PROGRESS NOTES
Update 1/5: Pre-cert approved from 1/5- 1/9 next review 1/9. Sergio Ann is RN [address] [facility] [phone]. Fax clinicals to [phone]. Will admit today. The [address] [facility]   After review, this patient is felt to be:       [x]  Appropriate for Acute Inpatient Rehab    []  Appropriate for Acute Inpatient Rehab Pending Insurance Authorization-    []  Not appropriate for Acute Inpatient Rehab    []  Referral received and ARU reviewing patient; Evaluation ongoing. Precert initiated 1/3 for ARU admission. Pt in agreement per  conversation with pt this AM. Ref#: [id number]. Will notify DCP with further updates. Thank you for the referral.    Katrina Issa OTR/L  Clinical Liaison [phone]   Electronically signed by Katrina Issa on 1/3/2023 at 12:57 PM    Chloé Askew RN, BSN.   ARU Clinical Liaison  [facility]  Phone: [phone]  Fax: [phone]

## 2023-01-03 NOTE — PROGRESS NOTES
Progress Note  Admit Date: 12/31/2022       Pt seen and examined  Doing ok no chest pain or shortness of breath no nausea or vomiting    Scheduled Medications:    levETIRAcetam  500 mg Oral BID    allopurinol  100 mg Oral Daily    amiodarone  200 mg Oral Daily    levothyroxine  75 mcg Oral Daily    montelukast  10 mg Oral Daily    atorvastatin  10 mg Oral Daily    torsemide  10 mg Oral Daily    budesonide  0.5 mg Nebulization BID    And    Arformoterol Tartrate  15 mcg Nebulization BID      PRN Medications: ipratropium-albuterol, acetaminophen, acetaminophen  Diet: ADULT DIET; Regular; Low Fat/Low Chol/High Fiber/2 gm Na    Continuous Infusions:    PHYSICAL EXAM:  BP (!) 107/46   Pulse 54   Temp 97.4 °F (36.3 °C) (Oral)   Resp 18   Ht 5' 0.98\" (1.549 m)   Wt 176 lb 9.4 oz (80.1 kg)   SpO2 99%   BMI 33.38 kg/m²   No results for input(s): POCGLU in the last 72 hours.     Intake/Output Summary (Last 24 hours) at 1/3/2023 1550  Last data filed at 1/3/2023 1400  Gross per 24 hour   Intake 540 ml   Output 2350 ml   Net -1810 ml       BP (!) 107/46   Pulse 54   Temp 97.4 °F (36.3 °C) (Oral)   Resp 18   Ht 5' 0.98\" (1.549 m)   Wt 176 lb 9.4 oz (80.1 kg)   SpO2 99%   BMI 33.38 kg/m²   General appearance: alert, appears stated age, and cooperative  Head: Normocephalic, without obvious abnormality, atraumatic  Neck: no adenopathy, no carotid bruit, no JVD, supple, symmetrical, trachea midline, and thyroid not enlarged, symmetric, no tenderness/mass/nodules  Lungs: clear to auscultation bilaterally  Heart: regular rate and rhythm, S1, S2 normal, no murmur, click, rub or gallop  Abdomen: soft, non-tender; bowel sounds normal; no masses,  no organomegaly  Extremities: extremities normal, atraumatic, no cyanosis or edema  Pulses: 2+ and symmetric    LABS:  Recent Labs     01/01/23  0601 01/02/23  0916 01/03/23  0541   WBC 7.9 4.6 3.9*   HGB 9.0* 7.6* 7.6*   HCT 28.1* 23.7* 23.4*    116* 116* Recent Labs     01/01/23  0601 01/02/23  0916 01/03/23  0541    137 135*   K 4.3 4.2 3.9    101 100   CO2 24 26 27   BUN 13 17 18   CREATININE 1.3* 1.3* 1.5*   GLUCOSE 75 82 86     No results for input(s): AST, ALT, ALB, BILITOT, ALKPHOS in the last 72 hours. No results for input(s): TROPONINI in the last 72 hours. No results for input(s): BNP in the last 72 hours. No results for input(s): CHOL, HDL in the last 72 hours. Invalid input(s): LDLCALCU  No results for input(s): INR in the last 72 hours. Assessment & Plan:    Patient Active Problem List:     Vaginal atrophy     History of endometrial cancer     Frequent UTI     H/O total hysterectomy with bilateral salpingo-oophorectomy (BSO)     Acute intra-cranial hemorrhage (HCC)     Intracranial hemorrhage (HCC)     KYARA (acute kidney injury) ([facility] [address]     Normocytic anemia     Hypertension     Hyperlipidemia     COPD (chronic obstructive pulmonary disease) ([facility] [address]      80 yo admitted with fall left subdural hematoma   Has aneurysm as well, neurosurgery following and will see as outpatient. With regards to syncope and fall, she has a hx of afib, she has not had arrhythmias here however she is bradycardic, I have ordered a tsh as one has not been done in some time, pt/ot following along with social work. Hold off on diuretics as well her bp is fine chf is fully compensated. The patient and / or the family were informed of the results of any tests, a time was given to answer questions, a plan was proposed and they agreed with plan.   Disposition: continue inpatient stay  Full MD Ricky William

## 2023-01-03 NOTE — PROGRESS NOTES
Pt alert and oriented in chair this morning. Pt able to eat all of breakfast with no issue. Pt able to walk to bathroom with front wheel walker with minimal assistance from RN. Pt had small BM and urinated. Pt back to chair with no issue. Call light in reach. Per conversation via perfect serve with Dr. Lisa Murphy holding morning Amio.  For low HR and BP    Enid Salinas RN

## 2023-01-03 NOTE — PROGRESS NOTES
Occupational Therapy  Facility/Department: [facility] ICU  Daily Treatment    Name: Shahid Seals  : 1935  MRN: [id number]  Date of Service: 1/3/2023    Discharge Recommendations:  Shahid Seals scored a 17/24 on the AM-PAC ADL Inpatient form. Current research shows that an AM-PAC score of 17 or less is typically not associated with a discharge to the patient's home setting. Based on the patient's AM-PAC score and their current ADL deficits, it is recommended that the patient have 5-7 sessions per week of Occupational Therapy at d/c to increase the patient's independence. At this time, this patient demonstrates complex nursing, medical, and rehabilitative needs, and would benefit from intensive rehabilitation services upon discharge from the Inpatient setting. This patient demonstrates the ability to participate in and benefit from an intensive therapy program with a coordinated interdisciplinary team approach to foster frequent, structured, and documented communication among disciplines, who will work together to establish, prioritize, and achieve treatment goals. Please see assessment section for further patient specific details. If patient discharges prior to next session this note will serve as a discharge summary. Please see below for the latest assessment towards goals. OT Equipment Recommendations  Other: defer       Patient Diagnosis(es): There were no encounter diagnoses. Past Medical History:  has a past medical history of Asthma, Cancer ([facility] [address] History of blood transfusion, Hyperlipidemia, Hypertension, Leg swelling, Osteoarthritis, UTI symptoms, and Venous insufficiency of leg. Past Surgical History:  has a past surgical history that includes pr dilation & curettage dx&/ther nonobstetric; pr vaginal hysterectomy uterus 250 gm/< (); pr arthrp kne condyle&platu medial&lat compartments (Right, ); Appendectomy ();  Carpal tunnel release; Hemorrhoid surgery; hernia repair; Nasal septum surgery; Tonsillectomy; Hysterectomy; joint replacement (Bilateral); Vein Surgery; and Capsule endoscopy (N/A, 4/1/2022). Treatment Diagnosis: impaired ADLs, functional mobility and transfers 2/2 debility      Assessment   Performance deficits / Impairments: Decreased functional mobility ; Decreased endurance;Decreased ADL status; Decreased balance;Decreased strength;Decreased safe awareness;Decreased high-level IADLs  Assessment: Pt is an 82 y/o F s/p SAH after mechanical fall loading groceries into car. Pt reports having baseline balance deficits and has a history of frequent falls. Pt lives alone and reports independence with all ADLs and functional mobility using RW. Pt currently requires CGA for functional transfers and mobility with RW, but is easily fatigued due to impaired activity tolerance. Pt is functioning below baseline and would benefit from intensive ongoing skilled OT to maximize functional independence for safe d/c home as pt will need to be independent since she lives alone. Will follow during inpt stay.   Treatment Diagnosis: impaired ADLs, functional mobility and transfers 2/2 debility  Prognosis: Good  REQUIRES OT FOLLOW-UP: Yes  Activity Tolerance  Activity Tolerance: Patient Tolerated treatment well  Activity Tolerance Comments: Vitals seated EOB at start of session: 60bpm, 97% RA, 113/46        Plan   Occupational Therapy Plan  Times Per Week: 5-7  Current Treatment Recommendations: Strengthening, Functional mobility training, Balance training, Endurance training, Neuromuscular re-education, Patient/Caregiver education & training, Safety education & training, Self-Care / ADL     Restrictions  Position Activity Restriction  Other position/activity restrictions: no activity restrictions noted    Subjective   General  Chart Reviewed: Yes  Patient assessed for rehabilitation services?: Yes  Additional Pertinent Hx: 80yo woman with HLD; HTN; BLE lymphedema; frequent falls; afib; OA. Presented to [facility] and was found to have a 9mm hemorrhagic contusion in her left frontal lobe with some scattered SAH in her left frontal and parietal lobes. 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 Family / Caregiver Present: No  Referring Practitioner: Dante Durbin DO  Diagnosis: intracranial hemorrhage 2/2 fall  Subjective  Subjective: Pt semi supine in bed upon arrival, pleasant and agreeable to OT tx. \"For over a year now the doctor's have recommended I get balance therapy. \" Pt denies pain. Social/Functional History  Social/Functional History  Lives With: Alone  Type of Home: Mobile home  Home Layout: One level  Home Access: Ramped entrance  Bathroom Shower/Tub: Walk-in shower, Shower chair with back  Bathroom Toilet: Handicap height (sink for leverage)  Bathroom Equipment: Hand-held shower, Shower chair, Grab bars in [address] [place]: Joce Janus, rolling, Walker, 4 wheeled, Leg , Sock aid, Reacher  Has the patient had two or more falls in the past year or any fall with injury in the past year?: Yes  ADL Assistance: [address]: Independent  Homemaking Responsibilities: Yes (COA aide recently started assisting for 3 hours once a week with dusting and vaccuuming)  Ambulation Assistance: Independent  Transfer Assistance: Independent  Active : Yes  Mode of Transportation: Car  Additional Comments: reports baseline difficulty with RLE and uses leg  to get into car; pt reports son lives in same mobile home park and daughter lives closeby who works from home and could stay with her if necessary and has multiple friends who can check in       Objective       Safety Devices  Type of Devices: Call light within reach;Nurse notified;Gait belt;Bed alarm in place; Left in bed     Toilet Transfers  Toilet - Technique: Ambulating (with RW)  Equipment Used: Standard toilet (with L grab bar)  Toilet Transfer: Contact guard assistance     ADL  Grooming: Contact guard assistance  UE Bathing: Stand by assistance (ventral sponge bathing at sink)  LE Bathing: Contact guard assistance (varun-area hygiene standing with CGA and BLEs seated with SBA)  Toileting: Contact guard assistance (clothing management standing with RW; SBA for seated hygiene)        Bed mobility  Rolling to Left: Stand by assistance  Rolling to Right: Stand by assistance  Supine to Sit: Minimal assistance (HOB raised)  Sit to Supine: Stand by assistance  Scooting: Stand by assistance (to EOB)  Transfers  Sit to stand: Contact guard assistance  Stand to sit: Contact guard assistance  Transfer Comments: with RW     Cognition  Overall Cognitive Status: WFL  Orientation  Overall Orientation Status: Within Functional Limits                                      Functional mobility: Pt walked to/from bathroom with gait belt, RW, and CGA. Education: Role of OT, safe t/f training, safe use of DME, awareness of deficits, discharge planning, ADL as therapeutic exercise, importance of OOB, energy conservation       AM-PAC Score        AM-PAC Inpatient Daily Activity Raw Score: 17 (01/03/23 1617)  AM-PAC Inpatient ADL T-Scale Score : 37.26 (01/03/23 1617)  ADL Inpatient CMS 0-100% Score: 50.11 (01/03/23 1617)  ADL Inpatient CMS G-Code Modifier : CK (01/03/23 1617)    Goals  Short Term Goals  Time Frame for Short Term Goals: by dc  Short Term Goal 1: Pt will complete LB dressing with SBA  Short Term Goal 2: Pt will don/doff footwear with mod I using AE  Short Term Goal 3: Pt will tolerate 5 minutes in stance for grooming activity. GOAL MET 1/3.  NEW GOAL: 8 mins functional standing activity with SBA  Patient Goals   Patient goals : to go home       Therapy Time   Individual Concurrent Group Co-treatment   Time In 1326         Time Out 1406         Minutes 40         Timed Code Treatment Minutes: 25 Minutes       If patient is discharged prior to next treatment session, this note will serve as the discharge summary.   Roc Hwang, OTR/L #[id number]

## 2023-01-04 LAB
T4 FREE: 1.5 NG/DL (ref 0.9–1.8)
TSH REFLEX: 5.73 UIU/ML (ref 0.27–4.2)

## 2023-01-04 PROCEDURE — 6370000000 HC RX 637 (ALT 250 FOR IP)

## 2023-01-04 PROCEDURE — 84439 ASSAY OF FREE THYROXINE: CPT

## 2023-01-04 PROCEDURE — 6370000000 HC RX 637 (ALT 250 FOR IP): Performed by: NURSE PRACTITIONER

## 2023-01-04 PROCEDURE — 1200000000 HC SEMI PRIVATE

## 2023-01-04 PROCEDURE — 97530 THERAPEUTIC ACTIVITIES: CPT

## 2023-01-04 PROCEDURE — 99232 SBSQ HOSP IP/OBS MODERATE 35: CPT | Performed by: PHYSICAL MEDICINE & REHABILITATION

## 2023-01-04 PROCEDURE — 97116 GAIT TRAINING THERAPY: CPT

## 2023-01-04 PROCEDURE — 84443 ASSAY THYROID STIM HORMONE: CPT

## 2023-01-04 PROCEDURE — 36415 COLL VENOUS BLD VENIPUNCTURE: CPT

## 2023-01-04 RX ADMIN — LEVETIRACETAM 500 MG: 500 TABLET, FILM COATED ORAL at 08:37

## 2023-01-04 RX ADMIN — LEVOTHYROXINE SODIUM 75 MCG: 0.07 TABLET ORAL at 06:18

## 2023-01-04 RX ADMIN — LEVETIRACETAM 500 MG: 500 TABLET, FILM COATED ORAL at 21:15

## 2023-01-04 RX ADMIN — ALLOPURINOL 100 MG: 100 TABLET ORAL at 08:37

## 2023-01-04 RX ADMIN — MONTELUKAST 10 MG: 10 TABLET, FILM COATED ORAL at 08:37

## 2023-01-04 RX ADMIN — ATORVASTATIN CALCIUM 10 MG: 10 TABLET, FILM COATED ORAL at 08:36

## 2023-01-04 RX ADMIN — AMIODARONE HYDROCHLORIDE 200 MG: 200 TABLET ORAL at 08:36

## 2023-01-04 ASSESSMENT — PAIN SCALES - GENERAL: PAINLEVEL_OUTOF10: 1

## 2023-01-04 NOTE — CARE COORDINATION
CM following: CM spoke with Chelle Armenta from [address] [place] [address] who reports the pt's precert is still pending. CM to follow up with pt on precert status. Electronically signed by Sherren Caul, MSW on 1/4/2023 at 1:26 PM  [phone]    ADDENDUM: CM met with pt at bedside. CM updated pt that precert is still pending for [address] [facility]. CM and pt discussed back up SNF options should ARU be denied by insurance. Pt comfortable with referrals to [facility] and [place]. [address] should insurance deny ARU. CM will continue to follow for discharge planning. Electronically signed by Sherren Caul, MSW on 1/4/2023 at 1:56 PM  [phone]    ADDENDUM: CM was informed insurance is requiring P2P by noon tomorrow. [phone] option 4. This request has been passed on to Dr. Marleny Esquivel through [employer].   Electronically signed by Sherren Caul, MSW on 1/4/2023 at 2:53 PM  [phone]

## 2023-01-04 NOTE — PROGRESS NOTES
Progress Note  Physical Medicine and Rehabilitation    Patient: Jenaro Blevins  [id number]  Date: 1/4/2023      Chief Complaint: fall    History of Present Illness/Hospital Course:  Sharda Watson is a 31-year-old female with PMHx of Afib (s/p watchman device), COPD, HTN and multiple AVM who came in with multiple falls in the past week. She was putting groceries in the car and the next thing she remembers is sitting in the chair. She was told that she fell and hit her head on the pole of a handicap sign. 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 Neurosurgery was consulted and was transferred to [facility] ICU. Interval history:   Doing well. Wants to come to the ARU when approved by insurance. Prior Level of Function:  Independent for mobility, ADLs, and IADLs.     Current Level of Function:  Moderate    Pertinent Social History:  Support: None      Past Medical History:   Diagnosis Date    Asthma     Cancer ([facility] [address]     skin cancer basal and squamous    History of blood transfusion     Hyperlipidemia     Hypertension     Leg swelling     Osteoarthritis     UTI symptoms     Venous insufficiency of leg        Past Surgical History:   Procedure Laterality Date    APPENDECTOMY  1969    CAPSULE ENDOSCOPY N/A 4/1/2022    CAPSULE ENDOSCOPY performed by Mone Claudio MD at [address] (CERVIX STATUS UNKNOWN)      JOINT REPLACEMENT Bilateral     bilateral TKR    NASAL SEPTUM SURGERY      GA ARTHRP KNE CONDYLE&PLATU MEDIAL&LAT COMPARTMENTS Right 2000    knee surgery    GA DILATION & CURETTAGE DX&/THER NONOBSTETRIC      D & C    GA VAGINAL HYSTERECTOMY UTERUS [address] GM/<  2004    Hysterectomy    TONSILLECTOMY      VEIN SURGERY      legs       Family History   Problem Relation Age of Onset    Hypertension Mother     Obesity Mother     Alzheimer's Disease Father Social History     Socioeconomic History    Marital status:      Spouse name: None    Number of children: None    Years of education: None    Highest education level: None   Tobacco Use    Smoking status: Former    Smokeless tobacco: Never   Substance and Sexual Activity    Alcohol use: Yes     Alcohol/week: 0.0 standard drinks     Comment: occ    Drug use: No           REVIEW OF SYSTEMS:   CONSTITUTIONAL: negative for fevers, chills, diaphoresis, appetite change, night sweats, unexpected weight change, fatigue  EYES: negative for blurred vision, eye discharge, visual disturbance and icterus  HEENT: negative for hearing loss, tinnitus, ear drainage, sinus pressure, nasal congestion, epistaxis and snoring  RESPIRATORY: Negative for hemoptysis, cough, sputum production  CARDIOVASCULAR: negative for chest pain, palpitations, exertional chest pressure/discomfort, syncope, edema  GASTROINTESTINAL: negative for nausea, vomiting, diarrhea, blood in stool, abdominal pain, constipation  GENITOURINARY: negative for frequency, dysuria, urinary incontinence, decreased urine volume, and hematuria  HEMATOLOGIC/LYMPHATIC: negative for easy bruising, bleeding and lymphadenopathy  ALLERGIC/IMMUNOLOGIC: negative for recurrent infections, angioedema, anaphylaxis and drug reactions  ENDOCRINE: negative for weight changes and diabetic symptoms including polyuria, polydipsia and polyphagia  MUSCULOSKELETAL: negative for pain, joint swelling, decreased range of motion  NEUROLOGICAL: Positive for light-headedness and headaches. Negative for  slurred speech, unilateral weakness  PSYCHIATRIC/BEHAVIORAL: negative for hallucinations, behavioral problems, confusion and agitation.      Physical Examination:  Vitals: Patient Vitals for the past 24 hrs:   BP Temp Temp src Pulse Resp SpO2 Weight   01/04/23 1057 (!) 106/58 97.9 °F (36.6 °C) Oral 51 17 98 % 168 lb 14 oz (76.6 kg)   01/04/23 0715 (!) 116/50 97.4 °F (36.3 °C) Oral 52 16 98 % --   01/04/23 0427 (!) 109/43 98 °F (36.7 °C) Oral 52 16 100 % --   01/04/23 0020 (!) 120/41 98 °F (36.7 °C) Oral 54 16 98 % --   01/03/23 2228 (!) 121/46 -- -- 65 24 -- --   01/03/23 2034 103/81 99.1 °F (37.3 °C) Oral 58 11 98 % --   01/03/23 2027 -- -- -- -- -- 98 % --   01/03/23 1900 -- -- -- 59 16 98 % --   01/03/23 1800 (!) 129/55 -- -- 58 14 96 % --   01/03/23 1600 (!) 114/45 97.2 °F (36.2 °C) Oral 56 16 94 % --   01/03/23 1500 -- -- -- 54 18 99 % --   01/03/23 1400 -- -- -- 52 19 -- --   01/03/23 1300 -- -- -- 61 19 -- --       Const: Alert. WDWN. No distress  Eyes: Conjunctiva noninjected, no icterus noted; pupils equal, round, and reactive to light. HENT: Atraumatic, normocephalic; Oral mucosa moist  Neck: Trachea midline, neck supple. No thyromegaly noted. CV: Regular rate and rhythm, no murmur rub or gallop noted  Resp: Lungs clear to auscultation bilaterally, no rales wheezes or ronchi, no retractions. Respirations unlabored. GI: Soft, nontender, nondistended. Normal bowel sounds. No palpable masses. Skin: Normal temperature and turgor. No rashes or breakdown noted. Ext: Bilateral pitting edema 1+. No varicosities. MSK: No joint tenderness, erythema, warmth noted. AROM intact. Neuro: Alert, oriented, appropriate. No cranial nerve deficits appreciated. Sensation intact to light touch. Motor examination reveals normal strength in all four limbs diffusely. No abnormalities with finger/nose or heel/shin noted. Reflexes normal and symmetric.   Psych: Stable mood, normal judgement, normal affect     Lab Results   Component Value Date    WBC 3.9 (L) 01/03/2023    HGB 7.6 (L) 01/03/2023    HCT 23.4 (L) 01/03/2023    MCV 91.0 01/03/2023     (L) 01/03/2023     Lab Results   Component Value Date    INR 1.06 12/30/2022    PROTIME 13.7 12/30/2022     Lab Results   Component Value Date    CREATININE 1.5 (H) 01/03/2023    BUN 18 01/03/2023     (L) 01/03/2023    K 3.9 01/03/2023     01/03/2023    CO2 27 01/03/2023     Lab Results   Component Value Date    ALT 11 12/30/2022    AST 21 12/30/2022    ALKPHOS 149 (H) 12/30/2022    BILITOT 0.5 12/30/2022       CT HEAD WO CONTRAST   Final Result      1. Stable left frontal intraparenchymal hematoma measuring 9 mm. 2.  Stable left subdural hematoma measuring up to 10 mm in thickness. 3.  Stable scattered foci of subarachnoid hemorrhage. 4.  Stable small left frontal scalp hematoma. CT HEAD WO CONTRAST   Final Result      New left frontal subdural hematoma measuring up to 10 mm in maximal thickness without significant mass effect or midline shift. Stable 9 mm intraparenchymal hematoma within the anterior left frontal lobe. Stable multi foci of subarachnoid hemorrhage. Mild chronic small vessel ischemic changes. Critical result called to the patient's nurse in the ICU at 11:11 AM 12/31/2022            Assessment:    1. Intracranial hemorrhage   - 9 mm left subdural hematoma with scattered foci of SAH 2/2 mechanical fall  - Keppra 500 mg BID for seizure ppx  - Neurochecks Q4H  - PT/OT  - SBP goal <160   - Neurology & NSGY following   2. Hypertension  - SBP goal <160  - PRN in place   3. Normocytic anemia 2/2 fall  - Hgb 7.6 with 9.2 on admission; continue to monitor   4. Hypothyroidism   - Continue home Synthroid     PT/OT: 16/18    Impairments - Decreased functional mobility, Decreased ADLs    Recommendations:    ARU appropriate;     Awaiting precbridget Gary D.O. M.P.H  PM&R  1/4/2023  12:33 PM

## 2023-01-04 NOTE — PLAN OF CARE
Problem: Pain  Goal: Verbalizes/displays adequate comfort level or baseline comfort level  Outcome: Progressing    Pain on with movement, once settled pain goes away. Problem: Safety - Adult  Goal: Free from fall injury  Outcome: Progressing  Uses call light appropriately, yellow socks, bed/ chair alarm      Problem: ABCDS Injury Assessment  Goal: Absence of physical injury  Outcome: Progressing  Bed in the lowest position, yellow socks, bed/chair alarm on, and call light within reach. Problem: Skin/Tissue Integrity  Goal: Absence of new skin breakdown  Description: 1. Monitor for areas of redness and/or skin breakdown  2. Assess vascular access sites hourly  3. Every 4-6 hours minimum:  Change oxygen saturation probe site  4. Every 4-6 hours:  If on nasal continuous positive airway pressure, respiratory therapy assess nares and determine need for appliance change or resting period. Outcome: Progressing  Left side bruising due fall and laceration (head), but no signs of pressure ulcer. Problem: Neurosensory - Adult  Goal: Achieves stable or improved neurological status  Outcome: Progressing  Flowsheets (Taken 1/4/2023 0531)  Achieves stable or improved neurological status: Assess for and report changes in neurological status  Goal: Absence of seizures  Outcome: Progressing  Flowsheets (Taken 1/4/2023 0531)  Absence of seizures: Monitor for seizure activity.   If seizure occurs, document type and location of movements and any associated apnea  Goal: Remains free of injury related to seizures activity  Outcome: Progressing  Goal: Achieves maximal functionality and self care  Outcome: Progressing   Q4 neuros have been normal, and no signs of seizures

## 2023-01-04 NOTE — PROGRESS NOTES
Pt. Was transferred from ICU at 7:15 am. Pt is alert and oriented to X4. VSS. 4 eyes assessment and head to toe assessment completed. Pt. has no any chief complain at this time. Will continue to monitor.

## 2023-01-04 NOTE — PROGRESS NOTES
Update: Precert denied due to pt not looking medically stable. P2P offered and due 1/5 by noon. [phone] Opt 4. Appeal # [phone]; fax [phone]. RN Consultant Macrina's direct line is [phone]. [facility]   After review, this patient is felt to be:       []  Appropriate for Acute Inpatient Rehab    [x]  Appropriate for Acute Inpatient Rehab Pending Insurance Authorization #[id number]    []  Not appropriate for Acute Inpatient Rehab    []  Referral received and ARU reviewing patient; Evaluation ongoing. Precert initiated 7/8/5677 for ARU admission. Pt in agreement per  conversation with pt this AM. Ref#: [id number]. CM aware. Thank you for the referral.     Yasmine Wilson M.A., [address] [place] [address]   (P): [phone]  (F): [phone]    Regina Mujica RN, BSN.   ARU Clinical Liaison  [facility]  Phone: [phone]  Fax: [phone]

## 2023-01-04 NOTE — PROGRESS NOTES
Patient seen and examined  She is doing well she is feeling better no headache no light headedness no nausea or vomiting  No fevers or chills  No chest pain or shortness of breath no light headedness    Vitals:    01/04/23 0020 01/04/23 0427 01/04/23 0715 01/04/23 1057   BP: (!) 120/41 (!) 109/43 (!) 116/50 (!) 106/58   Pulse: 54 52 52 51   Resp: 16 16 16 17   Temp: 98 °F (36.7 °C) 98 °F (36.7 °C) 97.4 °F (36.3 °C) 97.9 °F (36.6 °C)   TempSrc: Oral Oral Oral Oral   SpO2: 98% 100% 98% 98%   Weight:    168 lb 14 oz (76.6 kg)   Height:          Gen: pleasant alert oriented  Neck: no jvd  Chest: clear bilaterally  Cvs: s2s1 sinus bradycardia  Abd: soft nd nt bs normal active  Ext: no edema positive pulses    80 yo female admitted with ICH after fall, arf, anemia. Currently patient is doing great her bp is lower end but without symptoms, she is feeling well and would be a good candidate to go to acute rehab unit as she is otherwise independent. D/w social work will do peer to peer w insurance company.

## 2023-01-04 NOTE — PLAN OF CARE
Problem: Discharge Planning  Goal: Discharge to home or other facility with appropriate resources  Outcome: Progressing     Problem: Pain  Goal: Verbalizes/displays adequate comfort level or baseline comfort level  1/4/2023 1230 by Katalina Molina RN  Outcome: Progressing  1/4/2023 0531 by Mira Mcclure RN  Outcome: Progressing     Problem: Safety - Adult  Goal: Free from fall injury  1/4/2023 1230 by Katalina Molina RN  Outcome: Progressing  Flowsheets (Taken 1/4/2023 1115)  Free From Fall Injury:   Verle Cowing family/caregiver on patient safety   Based on caregiver fall risk screen, instruct family/caregiver to ask for assistance with transferring infant if caregiver noted to have fall risk factors  1/4/2023 0531 by Mira Mcclure RN  Outcome: Progressing     Problem: ABCDS Injury Assessment  Goal: Absence of physical injury  1/4/2023 1230 by Katalina Molina RN  Outcome: Progressing  Flowsheets (Taken 1/4/2023 1115)  Absence of Physical Injury: Implement safety measures based on patient assessment  1/4/2023 0531 by Mira Mcclure RN  Outcome: Progressing     Problem: Skin/Tissue Integrity  Goal: Absence of new skin breakdown  Description: 1. Monitor for areas of redness and/or skin breakdown  2. Assess vascular access sites hourly  3. Every 4-6 hours minimum:  Change oxygen saturation probe site  4. Every 4-6 hours:  If on nasal continuous positive airway pressure, respiratory therapy assess nares and determine need for appliance change or resting period.   1/4/2023 1230 by Katalina Molina RN  Outcome: Progressing  1/4/2023 0531 by Mira Mcclure RN  Outcome: Progressing     Problem: Neurosensory - Adult  Goal: Achieves stable or improved neurological status  1/4/2023 1230 by Katalina Molina RN  Outcome: Progressing  1/4/2023 0531 by Mira Mcclure RN  Outcome: Progressing  Flowsheets (Taken 1/4/2023 0531)  Achieves stable or improved neurological status: Assess for and report changes in neurological status  Goal: Absence of seizures  1/4/2023 1230 by Phillip Nazario RN  Outcome: Progressing  1/4/2023 0531 by Edel Haddad RN  Outcome: Progressing  Flowsheets (Taken 1/4/2023 0531)  Absence of seizures: Monitor for seizure activity.   If seizure occurs, document type and location of movements and any associated apnea  Goal: Remains free of injury related to seizures activity  1/4/2023 1230 by Phillip Nazario RN  Outcome: Progressing  1/4/2023 0531 by Edel Haddad RN  Outcome: Progressing  Goal: Achieves maximal functionality and self care  1/4/2023 1230 by Phillip Nazario RN  Outcome: Progressing  1/4/2023 0531 by Edel Haddad RN  Outcome: Progressing     Problem: Nutrition Deficit:  Goal: Optimize nutritional status  Outcome: Progressing

## 2023-01-04 NOTE — PROGRESS NOTES
4 Eyes Admission Assessment     I agree as the admission nurse that 2 RN's have performed a thorough Head to Toe Skin Assessment on the patient. ALL assessment sites listed below have been assessed on admission. Areas assessed by both nurses: yes  [x]   Head, Face, and Ears   [x]   Shoulders, Back, and Chest  [x]   Arms, Elbows, and Hands   [x]   Coccyx, Sacrum, and Ischium  [x]   Legs, Feet, and Heels        Does the Patient have Skin Breakdown?   No         Jaquan Prevention initiated:  No   Wound Care Orders initiated:  No      [facility] nurse consulted for Pressure Injury (Stage 3,4, Unstageable, DTI, NWPT, and Complex wounds) or Jaquan score 18 or lower:  No      Nurse 1 eSignature: Electronically signed by Maria L Abad RN on 1/4/23 at 7:32 AM EST    **SHARE this note so that the co-signing nurse is able to place an eSignature**    Nurse 2 eSignature: Electronically signed by Maria L Abad RN on 1/4/23 at 7:32 AM EST   #2 signature Abdifatah Riley RN

## 2023-01-04 NOTE — PROGRESS NOTES
Physical Therapy  Facility/Department: [facility]  Physical TherapyTreatment    Name: Baylee Cuellar  : 1935  MRN: [id number]  Date of Service: 2023    Discharge Recommendations: Baylee Cuellar scored a 17/24 on the AM-PAC short mobility form. Current research shows that an AM-PAC score of 17 or less is typically not associated with a discharge to the patient's home setting. Based on the patient's AM-PAC score and their current functional mobility deficits, it is recommended that the patient have 5-7 sessions per week of Physical Therapy at d/c to increase the patient's independence. At this time, this patient demonstrates complex nursing, medical, and rehabilitative needs, and would benefit from intensive rehabilitation services upon discharge from the Inpatient setting. This patient demonstrates the ability to participate in and benefit from an intensive therapy program with a coordinated interdisciplinary team approach to foster frequent, structured, and documented communication among disciplines, who will work together to establish, prioritize, and achieve treatment goals. Please see assessment section for further patient specific details. If patient discharges prior to next session this note will serve as a discharge summary. Please see below for the latest assessment towards goals. PT Equipment Recommendations  Equipment Needed:  (defer)      Patient Diagnosis(es): There were no encounter diagnoses. Past Medical History:  has a past medical history of Asthma, Cancer ([facility] [address] History of blood transfusion, Hyperlipidemia, Hypertension, Leg swelling, Osteoarthritis, UTI symptoms, and Venous insufficiency of leg. Past Surgical History:  has a past surgical history that includes pr dilation & curettage dx&/ther nonobstetric; pr vaginal hysterectomy uterus 250 gm/< (); pr arthrp kne condyle&platu medial&lat compartments (Right, ); Appendectomy ();  Carpal tunnel release; Hemorrhoid surgery; hernia repair; Nasal septum surgery; Tonsillectomy; Hysterectomy; joint replacement (Bilateral); Vein Surgery; and Capsule endoscopy (N/A, 4/1/2022). Assessment   Assessment: Demonstrates mild instability on her feet with use of walker and reports 4 falls recently, most recent one resulting in SDH. Pt also reports difficulty lifting legs into bed and car. Pt is below her baseline, lives alone & has had numerous falls recently. Dizziness limiting ability to ambulate far today. Would benefit from ongoing skilled PT prior to return home. Treatment Diagnosis: impaired mobility associated with ICH  Therapy Prognosis: Good  Requires PT Follow-Up: Yes  Activity Tolerance  Activity Tolerance: Patient limited by fatigue;Patient limited by endurance  Activity Tolerance Comments: some dizziness with activity. /63 sitting prior to session. 133/65 after walking     Plan   Physcial Therapy Plan  General Plan: 5-7 times per week  Current Treatment Recommendations: Strengthening, Balance training, Gait training, Functional mobility training, Transfer training, Endurance training, Patient/Caregiver education & training, Therapeutic activities, Safety education & training  Safety Devices  Type of Devices: Call light within reach, Nurse notified, Gait belt, Bed alarm in place, Left in bed  Restraints  Restraints Initially in Place: No     Restrictions  Position Activity Restriction  Other position/activity restrictions: no activity restrictions noted     Subjective   General  Chart Reviewed: Yes  Additional Pertinent Hx: Patient is an 82 y/o female admitted 12/31 s/p fall. CT head (+) forNew left frontal subdural hematoma measuring up to 10 mm in maximal thickness without significant mass effect or midline shift. Stable 9 mm intraparenchymal hematoma within the anterior left frontal lobe.      Stable multi foci of subarachnoid hemorrhage. - all stable on repeat exam.  Family / Caregiver Present: No  Diagnosis: ICH  Follows Commands: Within Functional Limits  Subjective  Subjective: Found up in chair, agreeable to PT. Reports 4 falls in past month & half. \"I don't remember this fall. \"  Reports that she was supposed to be called by a balance center for therapy but they never did. \"I've been waiting on them to call for a year. \"         Social/Functional History  Social/Functional History  Lives With: Alone  Type of Home: Mobile home  Home Layout: One level  Home Access: Ramped entrance  Bathroom Shower/Tub: Walk-in shower, Shower chair with back  Bathroom Toilet: Handicap height (sink for leverage)  Bathroom Equipment: Hand-held shower, Shower chair, Grab bars in [address]: [address], rolling, Walker, 4 wheeled, Leg , Sock aid, Reacher  Has the patient had two or more falls in the past year or any fall with injury in the past year?: Yes  ADL Assistance: [address] [place] [address] Avenue: Independent  Homemaking Responsibilities: Yes (COA aide recently started assisting for 3 hours once a week with dusting and vaccuuming)  Ambulation Assistance: Independent  Transfer Assistance: Independent  Active : Yes  Mode of Transportation: Car  Additional Comments: reports baseline difficulty with RLE and uses leg  to get into car; pt reports son lives in same mobile home park and daughter lives closeby who works from home and could stay with her if necessary and has multiple friends who can check in       Cognition   Orientation  Overall Orientation Status: Within Normal Limits     Objective                Bed mobility  Sit to Supine: Contact guard assistance (pt having to use her UEs to help LLE in bed)  Transfers  Sit to Stand: Contact guard assistance  Stand to Sit: Contact guard assistance  Ambulation  Device: Rolling Walker  Assistance: Contact guard assistance  Quality of Gait: slight lateral sway with each step, appearing unsteady but no overt loss of balance; tends to leave walker when gets close to chair to sit- cues to keep it with her  Distance: 13' to bathroom, 60' x 2     Balance  Sitting - Static: Good  Exercise Treatment: seated marches x 10 B (effortful on both & decreased motion noted); standing step-ups onto stair with 1 leg at a time x 10 reps CGA- pt reporting fatigue by 10th one especially on R                                                        AM-PAC Score  AM-PAC Inpatient Mobility Raw Score : 17 (01/04/23 1544)  AM-PAC Inpatient T-Scale Score : 42.13 (01/04/23 1544)  Mobility Inpatient CMS 0-100% Score: 50.57 (01/04/23 1544)  Mobility Inpatient CMS G-Code Modifier : CK (01/04/23 1544)               Goals- none met today  Short Term Goals  Time Frame for Short Term Goals: discharge  Short Term Goal 1: patient will perform bed mobility with SBA  Short Term Goal 2: patient will perform transfers sit<>stand with supervision  Short Term Goal 3: patient will ambulate 48' with FWW and supervision  Patient Goals   Patient Goals : none stated       Education  Patient Education  Education Given To: Patient  Education Provided: Role of Therapy;Plan of Care;Transfer Training; Fall Prevention Strategies  Education Provided Comments: keeping walker with her when turning to sit  Education Method: Verbal;Demonstration  Barriers to Learning: None  Education Outcome: Verbalized understanding      Therapy Time   Individual Concurrent Group Co-treatment   Time In 1411         Time Out 1444         Minutes 33          Timed Code Treatment Minutes:  33 Minutes    Total Treatment Minutes:  Felicia, [facility]

## 2023-01-05 ENCOUNTER — HOSPITAL ENCOUNTER (INPATIENT)
Age: 88
LOS: 8 days | Discharge: HOME HEALTH | DRG: 950 | End: 2023-01-13
Attending: PHYSICAL MEDICINE & REHABILITATION | Admitting: PHYSICAL MEDICINE & REHABILITATION
Payer: MEDICARE

## 2023-01-05 VITALS
HEART RATE: 51 BPM | OXYGEN SATURATION: 99 % | WEIGHT: 169.2 LBS | DIASTOLIC BLOOD PRESSURE: 40 MMHG | TEMPERATURE: 97.5 F | BODY MASS INDEX: 31.95 KG/M2 | HEIGHT: 61 IN | SYSTOLIC BLOOD PRESSURE: 102 MMHG | RESPIRATION RATE: 16 BRPM

## 2023-01-05 DIAGNOSIS — S06.5XAA SDH (SUBDURAL HEMATOMA): Primary | ICD-10-CM

## 2023-01-05 LAB — SARS-COV-2, NAAT: NOT DETECTED

## 2023-01-05 PROCEDURE — 6370000000 HC RX 637 (ALT 250 FOR IP): Performed by: PHYSICAL MEDICINE & REHABILITATION

## 2023-01-05 PROCEDURE — 6370000000 HC RX 637 (ALT 250 FOR IP): Performed by: NURSE PRACTITIONER

## 2023-01-05 PROCEDURE — 1280000000 HC REHAB R&B

## 2023-01-05 PROCEDURE — 6370000000 HC RX 637 (ALT 250 FOR IP)

## 2023-01-05 PROCEDURE — 97116 GAIT TRAINING THERAPY: CPT

## 2023-01-05 PROCEDURE — 97110 THERAPEUTIC EXERCISES: CPT

## 2023-01-05 PROCEDURE — 97530 THERAPEUTIC ACTIVITIES: CPT

## 2023-01-05 PROCEDURE — 99232 SBSQ HOSP IP/OBS MODERATE 35: CPT | Performed by: PHYSICAL MEDICINE & REHABILITATION

## 2023-01-05 PROCEDURE — 87635 SARS-COV-2 COVID-19 AMP PRB: CPT

## 2023-01-05 PROCEDURE — 97535 SELF CARE MNGMENT TRAINING: CPT

## 2023-01-05 PROCEDURE — 6370000000 HC RX 637 (ALT 250 FOR IP): Performed by: INTERNAL MEDICINE

## 2023-01-05 RX ORDER — LOPERAMIDE HYDROCHLORIDE 2 MG/1
2 CAPSULE ORAL 4 TIMES DAILY PRN
Status: DISCONTINUED | OUTPATIENT
Start: 2023-01-05 | End: 2023-01-13 | Stop reason: HOSPADM

## 2023-01-05 RX ORDER — ACETAMINOPHEN 325 MG/1
650 TABLET ORAL EVERY 4 HOURS PRN
Status: DISCONTINUED | OUTPATIENT
Start: 2023-01-05 | End: 2023-01-13 | Stop reason: HOSPADM

## 2023-01-05 RX ORDER — MONTELUKAST SODIUM 10 MG/1
10 TABLET ORAL DAILY
Status: DISCONTINUED | OUTPATIENT
Start: 2023-01-06 | End: 2023-01-13 | Stop reason: HOSPADM

## 2023-01-05 RX ORDER — ARFORMOTEROL TARTRATE 15 UG/2ML
15 SOLUTION RESPIRATORY (INHALATION) 2 TIMES DAILY
Status: DISCONTINUED | OUTPATIENT
Start: 2023-01-05 | End: 2023-01-06

## 2023-01-05 RX ORDER — BUDESONIDE 0.5 MG/2ML
0.5 INHALANT ORAL 2 TIMES DAILY
Status: CANCELLED | OUTPATIENT
Start: 2023-01-05

## 2023-01-05 RX ORDER — LEVOTHYROXINE SODIUM 0.07 MG/1
75 TABLET ORAL DAILY
Status: CANCELLED | OUTPATIENT
Start: 2023-01-06

## 2023-01-05 RX ORDER — IPRATROPIUM BROMIDE AND ALBUTEROL SULFATE 2.5; .5 MG/3ML; MG/3ML
1 SOLUTION RESPIRATORY (INHALATION) EVERY 4 HOURS PRN
Status: CANCELLED | OUTPATIENT
Start: 2023-01-05

## 2023-01-05 RX ORDER — MONTELUKAST SODIUM 10 MG/1
10 TABLET ORAL DAILY
Status: CANCELLED | OUTPATIENT
Start: 2023-01-06

## 2023-01-05 RX ORDER — LEVETIRACETAM 500 MG/1
500 TABLET ORAL 2 TIMES DAILY
Qty: 60 TABLET | Refills: 0 | Status: ON HOLD | OUTPATIENT
Start: 2023-01-05 | End: 2023-01-05

## 2023-01-05 RX ORDER — ATORVASTATIN CALCIUM 10 MG/1
10 TABLET, FILM COATED ORAL DAILY
Status: CANCELLED | OUTPATIENT
Start: 2023-01-06

## 2023-01-05 RX ORDER — BUDESONIDE 0.5 MG/2ML
0.5 INHALANT ORAL 2 TIMES DAILY
Status: DISCONTINUED | OUTPATIENT
Start: 2023-01-05 | End: 2023-01-06

## 2023-01-05 RX ORDER — LOPERAMIDE HYDROCHLORIDE 2 MG/1
2 CAPSULE ORAL 4 TIMES DAILY PRN
Status: DISCONTINUED | OUTPATIENT
Start: 2023-01-05 | End: 2023-01-05 | Stop reason: HOSPADM

## 2023-01-05 RX ORDER — ALLOPURINOL 100 MG/1
100 TABLET ORAL DAILY
Status: CANCELLED | OUTPATIENT
Start: 2023-01-06

## 2023-01-05 RX ORDER — ARFORMOTEROL TARTRATE 15 UG/2ML
15 SOLUTION RESPIRATORY (INHALATION) 2 TIMES DAILY
Status: CANCELLED | OUTPATIENT
Start: 2023-01-05

## 2023-01-05 RX ORDER — POLYETHYLENE GLYCOL 3350 17 G/17G
17 POWDER, FOR SOLUTION ORAL DAILY PRN
Status: CANCELLED | OUTPATIENT
Start: 2023-01-05

## 2023-01-05 RX ORDER — ACETAMINOPHEN 325 MG/1
650 TABLET ORAL EVERY 4 HOURS PRN
Status: CANCELLED | OUTPATIENT
Start: 2023-01-05

## 2023-01-05 RX ORDER — LEVETIRACETAM 500 MG/1
500 TABLET ORAL 2 TIMES DAILY
Status: DISCONTINUED | OUTPATIENT
Start: 2023-01-05 | End: 2023-01-13 | Stop reason: HOSPADM

## 2023-01-05 RX ORDER — POLYETHYLENE GLYCOL 3350 17 G/17G
17 POWDER, FOR SOLUTION ORAL DAILY PRN
Status: DISCONTINUED | OUTPATIENT
Start: 2023-01-05 | End: 2023-01-13 | Stop reason: HOSPADM

## 2023-01-05 RX ORDER — AMIODARONE HYDROCHLORIDE 200 MG/1
100 TABLET ORAL DAILY
Status: DISCONTINUED | OUTPATIENT
Start: 2023-01-06 | End: 2023-01-05 | Stop reason: HOSPADM

## 2023-01-05 RX ORDER — LEVETIRACETAM 500 MG/1
500 TABLET ORAL 2 TIMES DAILY
Status: CANCELLED | OUTPATIENT
Start: 2023-01-05

## 2023-01-05 RX ORDER — LEVOTHYROXINE SODIUM 0.07 MG/1
75 TABLET ORAL DAILY
Status: DISCONTINUED | OUTPATIENT
Start: 2023-01-06 | End: 2023-01-13 | Stop reason: HOSPADM

## 2023-01-05 RX ORDER — ATORVASTATIN CALCIUM 10 MG/1
10 TABLET, FILM COATED ORAL DAILY
Status: DISCONTINUED | OUTPATIENT
Start: 2023-01-06 | End: 2023-01-13 | Stop reason: HOSPADM

## 2023-01-05 RX ORDER — ALLOPURINOL 100 MG/1
100 TABLET ORAL DAILY
Status: DISCONTINUED | OUTPATIENT
Start: 2023-01-06 | End: 2023-01-13 | Stop reason: HOSPADM

## 2023-01-05 RX ORDER — BUDESONIDE 0.5 MG/2ML
0.5 INHALANT ORAL 2 TIMES DAILY
Qty: 60 EACH | Refills: 3 | Status: SHIPPED | OUTPATIENT
Start: 2023-01-05

## 2023-01-05 RX ORDER — AMIODARONE HYDROCHLORIDE 200 MG/1
100 TABLET ORAL DAILY
Status: CANCELLED | OUTPATIENT
Start: 2023-01-06

## 2023-01-05 RX ORDER — AMIODARONE HYDROCHLORIDE 100 MG/1
100 TABLET ORAL DAILY
Qty: 30 TABLET | Refills: 1 | Status: ON HOLD
Start: 2023-01-06 | End: 2023-01-13 | Stop reason: HOSPADM

## 2023-01-05 RX ORDER — AMIODARONE HYDROCHLORIDE 200 MG/1
100 TABLET ORAL DAILY
Status: DISCONTINUED | OUTPATIENT
Start: 2023-01-06 | End: 2023-01-13 | Stop reason: HOSPADM

## 2023-01-05 RX ORDER — IPRATROPIUM BROMIDE AND ALBUTEROL SULFATE 2.5; .5 MG/3ML; MG/3ML
1 SOLUTION RESPIRATORY (INHALATION) EVERY 4 HOURS PRN
Status: DISCONTINUED | OUTPATIENT
Start: 2023-01-05 | End: 2023-01-13 | Stop reason: HOSPADM

## 2023-01-05 RX ORDER — LOPERAMIDE HYDROCHLORIDE 2 MG/1
2 CAPSULE ORAL 4 TIMES DAILY PRN
Status: CANCELLED | OUTPATIENT
Start: 2023-01-05

## 2023-01-05 RX ADMIN — ACETAMINOPHEN 650 MG: 325 TABLET ORAL at 17:03

## 2023-01-05 RX ADMIN — LEVOTHYROXINE SODIUM 75 MCG: 0.07 TABLET ORAL at 06:04

## 2023-01-05 RX ADMIN — ATORVASTATIN CALCIUM 10 MG: 10 TABLET, FILM COATED ORAL at 08:21

## 2023-01-05 RX ADMIN — AMIODARONE HYDROCHLORIDE 200 MG: 200 TABLET ORAL at 08:24

## 2023-01-05 RX ADMIN — LEVETIRACETAM 500 MG: 500 TABLET, FILM COATED ORAL at 08:21

## 2023-01-05 RX ADMIN — ACETAMINOPHEN 650 MG: 325 TABLET ORAL at 06:04

## 2023-01-05 RX ADMIN — LEVETIRACETAM 500 MG: 500 TABLET, FILM COATED ORAL at 22:13

## 2023-01-05 RX ADMIN — LOPERAMIDE HYDROCHLORIDE 2 MG: 2 CAPSULE ORAL at 14:48

## 2023-01-05 RX ADMIN — ALLOPURINOL 100 MG: 100 TABLET ORAL at 08:21

## 2023-01-05 RX ADMIN — MONTELUKAST 10 MG: 10 TABLET, FILM COATED ORAL at 08:21

## 2023-01-05 ASSESSMENT — PAIN SCALES - GENERAL
PAINLEVEL_OUTOF10: 1
PAINLEVEL_OUTOF10: 0
PAINLEVEL_OUTOF10: 0

## 2023-01-05 ASSESSMENT — PAIN DESCRIPTION - DESCRIPTORS: DESCRIPTORS: ACHING

## 2023-01-05 ASSESSMENT — PAIN DESCRIPTION - LOCATION: LOCATION: HEAD

## 2023-01-05 ASSESSMENT — PAIN DESCRIPTION - ORIENTATION: ORIENTATION: ANTERIOR

## 2023-01-05 ASSESSMENT — PAIN DESCRIPTION - PAIN TYPE: TYPE: ACUTE PAIN

## 2023-01-05 NOTE — PROGRESS NOTES
Occupational Therapy  Occupational Therapy  Daily Treatment Note  Patient Name: Drew Joel  MRN: [id number]    Chart Reviewed: Yes       Other position/activity restrictions: no activity restrictions noted     Additional Pertinent Hx: Patient is an 80 y/o female admitted 12/31 s/p fall. CT head (+) forNew left frontal subdural hematoma measuring up to 10 mm in maximal thickness without significant mass effect or midline shift. Stable 9 mm intraparenchymal hematoma within the anterior left frontal lobe. Stable multi foci of subarachnoid hemorrhage. - all stable on repeat exam.      Diagnosis: intracranial hemorrhage 2/2 fall  Treatment Diagnosis: impaired ADLs, functional mobility and transfers 2/2 debility    Subjective: Pt seated in chair upon entry, initially declined therapy stating \"I don't need occupational therapy, just balance therapy. \" Pt educated on role of OT and agreeable to therapy session/ADL shower. Pt with feeling of light headedness and BP 91/58 - RN notified. General Comment:  Pt sit to stand CGA and ambulated ~18 ft to bathroom shower with rw. Pt shower transfer CGA. Pt washed UB setup/SBA and LB setup/Mod A to wash both feet (pt CGA in stance to wash buttocks/varun area). Pt donned hospital gown, applied deodorant and lotion. Pt used reacher to thread brief and sock aid to don  socks. Pt CGA in stance to pull up brief. Pt ambulated CGA a few ft to sink and stand to sit CGA. Pt seated at sink combed hair and completed oral care. Pt ambulated ~18ft back to chair with rw at [address]. Pt stand to sit CGA. Pt with report of light headedness and BP 91/58 (RN notified). Call light in reach and chair alarm on.     Pain: Denies    Social/Functional History  Lives With: Alone  Type of Home: Mobile home  Home Layout: One level  Home Access: Ramped entrance  Bathroom Shower/Tub: Walk-in shower, Shower chair with back  Bathroom Toilet: Handicap height (sink for leverage)  Bathroom Equipment: Hand-held shower, Shower chair, Grab bars in shower  Home Equipment: [address], rolling, Walker, 4 wheeled, Leg , Sock aid, Reacher  Has the patient had two or more falls in the past year or any fall with injury in the past year?: Yes  ADL Assistance: [address] [place] [address] Avenue: Independent  Homemaking Responsibilities: Yes (COA aide recently started assisting for 3 hours once a week with dusting and vaccuuming)  Ambulation Assistance: Independent  Transfer Assistance: Independent  Active : Yes  Mode of Transportation: Car  Additional Comments: reports baseline difficulty with RLE and uses leg  to get into car; pt reports son lives in same mobile home park and daughter lives closeby who works from home and could stay with her if necessary and has multiple friends who can check in  Prior Function  ADL Assistance: [address] [place] [address] Avenue: [address] [place] [address] Place: Independent  Transfer Assistance: Independent  Additional Comments: reports baseline difficulty with RLE and uses leg  to get into car; pt reports son lives in same mobile home park and daughter lives closeby who works from home and could stay with her if necessary and has multiple friends who can check in    Objective:    Cognition/Orientation:WFL    Bed mobility - N/A    Functional Mobility   Sit to Stand:CGA  Stand to Sit:CGA  Bed to Chair Transfer: N/A  Commode Transfer:N/A  Shower transfer: to TTB CGA    ADLs   Grooming: Mod I seated at sink with oral care/comb hair  Bathing:UB SBA and LB Mod A for both feet  UB dressing:hospital gown  LB dressing:CGA for brief with reacher and Supervision for  socks with sock aid  Toileting:N/A    UE Exercises N/A  Activity Tolerance: Good/Fair - reported light headed and BP at EOS 91/58      Patient Education: AE for LB dressing, transfer training, role of OT, d/c rec    Safety Devices in Place: Chair alarm on and call light in reach    Assessment:  Pt with shower level ADL this date, completed at A for UB and Mod A LB. Pt CGA for functional transfers and mobility with no LOB. Pt with use of AE dressed LB at [facility]. Pt is limited by generalized weakness, decreased balance and is functioning below baseline, where pt was living alone and reports being Independent. Pt with multiple falls prior to hospitalization. Pt would greatly benefit from intensive inpt therapy before d/c home to maximize functional independence and safety. Continue with POC. Discharge Recommendations: Shelbie Santiago scored a 17/24 on the AM-PAC ADL Inpatient form. Current research shows that an AM-PAC score of 17 or less is typically not associated with a discharge to the patient's home setting. Based on the patient's AM-PAC score and their current ADL deficits, it is recommended that the patient have 5-7 sessions per week of Occupational Therapy at d/c to increase the patient's independence. At this time, this patient demonstrates complex nursing, medical, and rehabilitative needs, and would benefit from intensive rehabilitation services upon discharge from the Inpatient setting. This patient demonstrates the ability to participate in and benefit from an intensive therapy program with a coordinated interdisciplinary team approach to foster frequent, structured, and documented communication among disciplines, who will work together to establish, prioritize, and achieve treatment goals. Please see assessment section for further patient specific details. If patient discharges prior to next session this note will serve as a discharge summary. Please see below for the latest assessment towards goals.     Equipment Needs: defer to next setting (sock aid)     Therapy Time:   Individual Concurrent Group Co-treatment   Time In 954         Time Out 1047         Minutes 53         Timed Code Treatment Minutes:  53  Total Treatment Minutes:  53      Goals:  Short Term Goals  Time Frame for Short Term Goals: by dc  Short Term Goal 1: Pt will complete LB dressing with SBA - CGA brief, continue goal 1/5  Short Term Goal 2: Pt will don/doff footwear with mod I using AE - goal met socks continue for shoes  Short Term Goal 3: Pt will tolerate 5 minutes in stance for grooming activity. GOAL MET 1/3. NEW GOAL: 8 mins functional standing activity with SBA - goal not met 1/5         Plan:      Times Per Week: 5-7        If patient is discharged prior to next treatment, this note will serve as the discharge summary.     Nevaeh Duarte, [address]

## 2023-01-05 NOTE — PROGRESS NOTES
Department of Physical Medicine & Rehabilitation  Progress Note    Patient Identification:  Catalina Kidd  [id number]  : 1935  Admit date: 2022    Chief Complaint: Intracranial hemorrhage ([address]    Subjective:   No acute events overnight. Patient seen this am. She is doing well and has no complaints. Peer to peer required and approved. Admit to ARU today. ROS: No fever, chills, chest pain, shortness of breath, abdominal pain, cough, nausea, vomiting, diarrhea, constipation, dizziness    Objective:  Patient Vitals for the past 24 hrs:   BP Temp Temp src Pulse Resp SpO2 Weight   23 0824 114/74 -- -- 57 -- -- --   23 0715 116/62 98 °F (36.7 °C) Oral 52 16 98 % --   23 0302 106/60 97.8 °F (36.6 °C) Oral 58 16 98 % --   23 2315 104/61 98 °F (36.7 °C) Oral 58 16 98 % --   23 1831 130/70 97.7 °F (36.5 °C) Oral 66 16 96 % --   23 1530 106/62 97.4 °F (36.3 °C) Oral 54 16 96 % --   23 1057 (!) 106/58 97.9 °F (36.6 °C) Oral 51 17 98 % 168 lb 14 oz (76.6 kg)     Const: Alert. No distress, pleasant. HEENT: Normocephalic, atraumatic. Normal sclera/conjunctiva. MMM. CV: Regular rate and rhythm. Resp: No respiratory distress. Lungs CTAB. Abd: Soft, nontender, nondistended, NABS+   Ext: Bilateral KASHMIR 1+. Neuro: Alert, oriented, appropriately interactive. Psych: Cooperative, appropriate mood and affect    Laboratory data: Available via EMR. Last 24 hour lab  No results found for this or any previous visit (from the past 24 hour(s)).     Therapy progress:  PT  Position Activity Restriction  Other position/activity restrictions: no activity restrictions noted  Objective     Sit to Stand: Contact guard assistance  Stand to Sit: Contact guard assistance  Device: Rolling Walker  Assistance: Contact guard assistance  Distance: 15' to bathroom, 60' x 2  OT  PT Equipment Recommendations  Equipment Needed:  (defer)  Toilet - Technique: Ambulating (with RW)  Equipment Used: Standard toilet (with L grab bar)  Toilet Transfers Comments: use of GB on L  Assessment        SLP          Body mass index is 31.92 kg/m². Assessment and Plan:    1. Intracranial hemorrhage   - 9 mm left subdural hematoma with scattered foci of SAH 2/2 mechanical fall  - Keppra 500 mg BID for seizure ppx  - Neurochecks Q4H  - PT/OT  - SBP goal <160   - Neurology & NSGY following   2. Hypertension  - SBP goal <160  - PRN in place   3. Normocytic anemia 2/2 fall  - Hgb 7.6 with 9.2 on admission; continue to monitor   4. Hypothyroidism   - Continue home Synthroid      PT/OT: 16/18     Impairments - Decreased functional mobility, Decreased ADLs    Recommendations:    ARU admit today     Will discuss with attending Dr. Carmen Matta MD  Internal Medicine PGY-3  1/5/2023  9:55 AM      This patient has been seen, examined, and discussed with the resident. I was part of the key critical services provided to the patient. I agree with the residents documentation. This note may have been altered to reflect my own examination findings, impression, and recommendations.        Luther Garcia D.O. M.P.H  PM&R  1/5/2023  11:38 AM

## 2023-01-05 NOTE — PLAN OF CARE
Problem: Discharge Planning  Goal: Discharge to home or other facility with appropriate resources  Outcome: Not Progressing     Problem: Pain  Goal: Verbalizes/displays adequate comfort level or baseline comfort level  Outcome: Not Progressing     Problem: Safety - Adult  Goal: Free from fall injury  Outcome: Not Progressing     Problem: Skin/Tissue Integrity  Goal: Absence of new skin breakdown  Description: 1. Monitor for areas of redness and/or skin breakdown  2. Assess vascular access sites hourly  3. Every 4-6 hours minimum:  Change oxygen saturation probe site  4. Every 4-6 hours:  If on nasal continuous positive airway pressure, respiratory therapy assess nares and determine need for appliance change or resting period.   Outcome: Not Progressing     Problem: Neurosensory - Adult  Goal: Achieves stable or improved neurological status  Outcome: Not Progressing

## 2023-01-05 NOTE — PROGRESS NOTES
Patient alert and oriented x4. VSS on RA. NIH 0. No acute neuro changes noted. Denies pain. X1 assist with gait belt and walker to bathroom. Voiding adequately. Patient able to have BM today although loose. PRN immodium given. Patient able to shower with therapy today. Denies any further needs at this time.

## 2023-01-05 NOTE — PLAN OF CARE
Problem: Safety - Adult  Goal: Free from fall injury  Outcome: Not Progressing     Problem: Neurosensory - Adult  Goal: Achieves stable or improved neurological status  Outcome: Not Progressing     Problem: Discharge Planning  Goal: Discharge to home or other facility with appropriate resources  1/5/2023 0608 by Dilan Vizcarra RN  Outcome: Progressing  1/5/2023 0429 by Dilan Vizcarra RN  Outcome: Not Progressing     Problem: Pain  Goal: Verbalizes/displays adequate comfort level or baseline comfort level  1/5/2023 0608 by Dilan Vizcarra RN  Outcome: Progressing  1/5/2023 0429 by Dilan Vizcarra RN  Outcome: Not Progressing     Problem: Safety - Adult  Goal: Free from fall injury  Outcome: Not Progressing     Problem: Skin/Tissue Integrity  Goal: Absence of new skin breakdown  Description: 1. Monitor for areas of redness and/or skin breakdown  2. Assess vascular access sites hourly  3. Every 4-6 hours minimum:  Change oxygen saturation probe site  4. Every 4-6 hours:  If on nasal continuous positive airway pressure, respiratory therapy assess nares and determine need for appliance change or resting period.   1/5/2023 8277 by Dilan Vizcarra RN  Outcome: Progressing  1/5/2023 0429 by Dilan Vizcarra RN  Outcome: Not Progressing     Problem: Neurosensory - Adult  Goal: Achieves stable or improved neurological status  Outcome: Not Progressing

## 2023-01-05 NOTE — PROGRESS NOTES
NURSING ASSESSMENT: ARU ADMISSION  [facility]    Patient:Bee HATFIELD [facility]     Rehab Dx/Hx: SDH (subdural hematoma) [S06. 5XAA]   :1935  ZOV:[id number]  Date of Admit: 2023  Room #: 3114/3114-01    Subjective:   Patient admitted to room 3114 @ 1730 from  via WC. Alert and oriented x4. Oriented to room and call light system. Oriented to rehab routine and therapy schedules. Informed about care conferences and ordering of meals. Is this a stroke patient?: Yes []    No [x]      If yes, has the PATH-S instrument been given to a family caregiver? Yes []No []        Drug / Medication Review:   Medications were reviewed by RN at time of admission  [x]  No potential or actual clinically significant medication issues were noted.      []   Yes, a clinically significant medication issue was identified                 []  Adverse Drug Event:                  []  Allergy:                  []  Side Effect:                  []  Ineffective Therapy:                  []  Drug Interaction:                 []  Duplicated Therapy:                 []  Untreated Indication:                  []  Non-adherence:                 []  Other:                  Nursing/Pharmacy contacted the physician:     Date:              Time:                  Actions recommended by physician were completed:   Date :            Time:    4 Eyes Skin Assessment   The patient is being assessed for: Admission     I agree that 2 RN's have performed a thorough Head to Toe Skin Assessment on the patient. ALL assessment sites listed below have been assessed. Scattered bruising  Hematoma L forehead   Large bruise on L hip       Areas assessed by both nurses:   [x]   Head, Face, and Ears   [x]   Shoulders, Back, and Chest, Abdomen  [x]   Arms, Elbows, and Hands   [x]   Coccyx, Sacrum, and Ischium  [x]   Legs, Feet, and Heel     Does the Patient have Skin Breakdown?   Yes / No         Jaquan Prevention initiated: No  Wound Care Orders initiated:  Not Applicable      [facility] nurse consulted for Pressure Injury (Stage 3,4, Unstageable, DTI, NWPT, Complex wounds)and New or Established Ostomies:  Not Applicable    Primary Nurse eSignature: Jan Brewer RN  Co-signer eSignature:

## 2023-01-05 NOTE — CARE COORDINATION
Case Management Assessment            Discharge Note                    Date / Time of Note: 1/5/2023 2:00 PM                  Discharge Note Completed by: Neva Padgett RN    Patient Name: Chelsey Avila   YOB: 1935  Diagnosis: Intracranial hemorrhage ([facility] [address] [I62.9]   Date / Time: 12/31/2022  6:04 AM    Current PCP: Sam Elias patient: No    Hospitalization in the last 30 days: No    Advance Directives:  Code Status: Full Code  [place] DNR form completed and on chart: Not Indicated    Financial:  Payor: Susanne Santiago / Plan: Dejah Herrera PPO / Product Type: Medicare /      Pharmacy:    [facility] [id number], [address] [facility] [address] [facility] [phone]  [address] [place] [address] Dr Charly Cantu  Phone: [phone] Fax: [phone]    [address] [facility] [phone] - F [address] [place] [address]  Phone: [phone] Fax: [phone]      Assistance purchasing medications?: Potential Assistance Purchasing Medications: No  Assistance provided by Case Management: None at this time    Does patient want to participate in local refill/ meds to beds program?: No    Meds To Beds General Rules:  1. Can ONLY be done Monday- Friday between 8:30am-5pm  2. Prescription(s) must be in pharmacy by 3pm to be filled same day  3. Copy of patient's insurance/ prescription drug card and patient face sheet must be sent along with the prescription(s)  4. Cost of Rx cannot be added to hospital bill. If financial assistance is needed, please contact unit  or ;  or  CANNOT provide pharmacy voucher for patients co-pays  5.  Patients can then  the prescription on their way out of the hospital at discharge, or pharmacy can deliver to the bedside if staff is available. (payment due at time of pick-up or delivery - cash, check, or card accepted) Able to afford home medications/ co-pay costs: Yes    ADLS:  Current PT AM-PAC Score: 17 /24  Current OT AM-PAC Score: 17 /24      DISCHARGE Disposition: Inpatient Rehab: [facility]  Phone: [phone] Fax: [phone]    LOC at discharge: Not Applicable  SLY Completed: Not Indicated    Notification completed in HENS/PAS?:  Not Applicable    IMM Completed:   Not Indicated    Transportation:  Transportation PLAN for discharge:  bed/WC    Mode of Transport: Not Applicable  Reason for medical transport: Not Applicable  Name of Transport Company: Not Applicable  Time of Transport: n/a    Transport form completed: Not Indicated    Home Care:  [address] ordered at discharge: Not [address]: Not Applicable  Orders faxed: No    Durable Medical Equipment:  DME Provider: n/a  Equipment obtained during hospitalization: defer    Home Oxygen and Respiratory Equipment:  Oxygen needed at discharge?: Not [address]: Not Applicable  Portable tank available for discharge?: No        Additional CM Notes:   Patient is discharging to [facility], p2p approved. Patient will transfer to unit later today once report taken nurse to nurse. Pt will transfer via WC or bed. The Plan for Transition of Care is related to the following treatment goals of Intracranial hemorrhage ([facility] [address] [I62.9]    The Patient and/or patient representative Lyndon Myles and her family were provided with a choice of provider and agrees with the discharge plan Yes    Freedom of choice list was provided with basic dialogue that supports the patient's individualized plan of care/goals and shares the quality data associated with the providers.  Yes    Care Transitions patient: No    Swetha Longoria RN  [facility]  Case Management Department  Ph: [phone]  Fax: [phone]

## 2023-01-05 NOTE — PROGRESS NOTES
Pt is alert and oriented to X4. VSS. No any chief complain. Voiding adequately. Medication done as per [facility]. Forehead wound cleansed with soap and warm water. Will continue to monitor.

## 2023-01-05 NOTE — PLAN OF CARE
ARU PATIENT TREATMENT PLAN  [address], [address]    : 1935  Acct #: [de-identified]  MRN: [id number]  PHYSICIAN:  Guerda Duran, DO  Primary Problem    Patient Active Problem List   Diagnosis    Vaginal atrophy    History of endometrial cancer    Frequent UTI    H/O total hysterectomy with bilateral salpingo-oophorectomy (BSO)    Acute intra-cranial hemorrhage (HCC)    Intracranial hemorrhage (HCC)    KYARA (acute kidney injury) ([facility] [address]    Normocytic anemia    Hypertension    Hyperlipidemia    COPD (chronic obstructive pulmonary disease) (HCC)    SDH (subdural hematoma)    Malnutrition (HCC)       Rehabilitation Diagnosis:  2.22, Traumatic, closed injury  ADMIT DATE:2023    Patient Goals: \"to learn how to get up from a fall and to be independent so I can go home\"  Admitting Impairments: Decreased functional mobility ; Decreased endurance;Decreased ADL status; Decreased balance;Decreased strength;Decreased safe awareness;Decreased high-level IADLs  Activity Restrictions: none  Participation Limitations: none     CARE PLAN   NURSING:  Oscar Albarran while on this unit will:  [x] Be continent of bowel and bladder     [x] Have an adequate number of bowel movements  [x] Urinate with no urinary retention >300ml in bladder  [] Complete bladder protocol with thomas removal  [x] Maintain O2 SATs at _>90_%  [x] Have pain managed while on ARU       [] Be pain free by discharge   [x] Have no skin breakdown while on ARU  [x] Have improved skin integrity via wound measurements  [x] Have no signs/symptoms of infection at the wound site  [x] Be free from injury during hospitalization   [] Complete education with patient/family with understanding demonstrated for:  [] Adjustment   [] Other:     Nursing Interventions will include:  [x] bowel/bladder training   [x] education for medical assistive devices   [x] medication education [x] O2 saturation management   [x] energy conservation   [x] stress management techniques   [x] fall prevention   [x] alarms protocol   [x] seating and positioning   [x] skin/wound care   [x] pressure relief instruction   [x] dressing changes     [x] infection protection   [x] DVT prophylaxis  [x] assistance with in room safety with transfers to bed, toilet, wheelchair, shower   [x] bathroom activities and hygiene  [] Other:    Patient/Caregiver Education for:  [x] Disease/sustained injury/management     [x] Medication Use  [x] Surgical intervention  [x] Safety  [x] Body mechanics and or joint protection  [x] Health maintenance     [] Other:     PHYSICAL THERAPY:  Goals:                  Short Term Goals  Time Frame for Short Term Goals: 10 days  Short Term Goal 1: pt will perform bed mobility independently  Short Term Goal 2: pt will perform sit<>stand with LRAD and mod I  Short Term Goal 3: pt will ambulate 150' with LRAD and mod I  Short Term Goal 4: Pt will complete ascent/descent of 10' ramp with LRAD and mod I  Short Term Goal 5: Pt will compelte ascent/descent of 4 steps with B HR and mod I               These goals were reviewed with this patient at the time of assessment and Albert Guardado is in agreement.      Plan of Care: Pt to be seen 5 out of 7 days per week per ARU protocol (90 minutes with PT)                  Current Treatment Recommendations: Strengthening, Balance training, Gait training, Functional mobility training, Transfer training, Endurance training, Patient/Caregiver education & training, Therapeutic activities, Safety education & training    OCCUPATIONAL THERAPY:  Goals:             Short Term Goals  Time Frame for Short Term Goals: STG=LTG :  Long Term Goals  Time Frame for Long Term Goals : 10 days  Long Term Goal 1: Pt will perform oral hygiene with mod I  Long Term Goal 2: Pt will perform toileting with mod I  Long Term Goal 3: Pt will perform bathing routine with supervision using AE as needed (i.e long handled sponge)  Long Term Goal 4: Pt will perform LB dressing routine with AE as needed and mod I  Long Term Goal 5: Pt will perform iADL task of laundry with AE as needed and supervision : These goals were reviewed with this patient at the time of assessment and Georgette Laura is in agreement    Plan of Care:  Pt to be seen 5 out of 7 days per week per ARU protocol (90 minutes with OT)       SPEECH THERAPY: Goals will be left blank if speech is not following this patient. Goals:  Plan of Care:  Pt to be seen 5 out of 7 days per week per ARU protocol (0 minutes with SLP)    Therapy Treatments will include:  [x]  therapeutic exercises    [x]  gait training     []  neuromuscular re-ed                            [x]  transfer training             [] community reintegration    [x] bed mobility                          []  w/c mobility and training  []  self care    []home mgmt    []  cognitive training            [x]  energy conservation        []  dysphagia tx    []  speech/language/communication therapy   []  group therapy    [x]  patient/family education    [] Other:    CASE MANAGEMENT:  Goals: Assist patient/family with discharge planning, patient/family counseling, and coordination with insurance during ARU stay.     Admission Period/Goal QM SCORES  QM Admit/Goal Score   Eating CARE Score: 5 / Discharge Goal: Independent   Oral Hygiene CARE Score: 4 / Discharge Goal: Independent   Shower/Bathing CARE Score: 3 / Discharge Goal: Supervision or touching assistance   UB Dressing CARE Score: 3 / Discharge Goal: Independent   LB Dressing CARE Score: 3 / Discharge Goal: Independent   Putting on/off Footwear CARE Score: 3 / Discharge Goal: Independent   Toileting Hygiene CARE Score: 3 /  Discharge Goal: Independent   Bladder Continence Bladder Continence: Always continent    Bowel Continence      Toilet Transfers CARE Score: 4 /  Discharge Goal: Independent   Shower/Bathe Self  CARE Score: 3 / Discharge Goal: Supervision or touching assistance   Rolling Left and Right CARE Score: 4 / Discharge Goal: Independent   Sit to Lying CARE Score: 4 / Discharge Goal: Independent   Lying to Sitting on Bedside CARE Score: 4 / Discharge Goal: Independent   Sit to Stand CARE Score: 4 / Discharge Goal: Independent   Chair/Bed to Chair Transfer CARE Score: 4 / Discharge Goal: Independent   Car Transfers CARE Score: 4 / Discharge Goal: Independent   Walk 10 Feet CARE Score: 4 / Discharge Goal: Independent   Walk 50 Feet with Two Turns CARE Score: 4 / Discharge Goal: Independent   Walk 150 Feet CARE Score: 4 / Discharge Goal: Independent   Walk 10 Feet on Uneven Surfaces CARE Score: 4 / Discharge Goal: Independent   1 Step (Curb) CARE Score: 4 / Discharge Goal: Independent   4 Steps CARE Score: 4 / Discharge Goal: Independent   12 Steps CARE Score: 4 / Discharge Goal: Independent   Picking up Object from Floor CARE Score: 4 / Discharge Goal: Independent   Wheel 50 Feet with 2 Turns   /     Type         [] Manual        [] Motorized        [] N/A   Wheel 150 Feet   /     Type         [] Manual        [] Motorized        [] N/A        Drew Joel will be seen a minimum of 3 hours of therapy per day, a minimum of 5 out of 7 days per week (please see above for specific treatment plan per PT/OT/SLP). [] In this rare instance due to the nature of this patient's medical involvement, this patient will be seen 15 hours per week (900 minutes within a 7day period). In addition, dietician/nutritionist may monitor calorie count as well as intake and collaboratively work with SLP on dietary upgrades. Neuropsychology/Psychology may evaluate and provide necessary support.     Medical issues being managed closely and that require 24hour availability of a physician:  [] Swallowing Precautions  [x] Bowel/Bladder Fx  [] Weight bearing precautions  [x] Wound Care    [x] Pain Mgmt   [] Infection Protection  [x] DVT Prophylaxis   [x] Fall Precautions  [x] Fluid/Electrolyte/Nutrition Balance  [] Voice Protection   [] Respiratory  [] Other:    Medical Prognosis: [x] Good  [] Fair    [] Guarded   Total expected IRF days 10  Anticipated discharge destination:   [] Home Independently   [x] Home Modified Independent  [] Home with supervision    []SNF     [] Other                                           Physician anticipated functional outcomes: Patient will progress to a level of MOD I to independent with functional mobility and all ADLs to allow for a safe return to prior living environment. IPOC brief synthesis: Glenn Oscar is a 60-year-old female with PMHx of Afib (s/p watchman device), COPD, HTN and multiple AVM who came in with multiple falls in the past week. She was putting groceries in the car and the next thing she remembers is sitting in the chair. She was told that she fell and hit her head on the pole of a handicap sign. 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 Neurosurgery was consulted and was transferred to [facility] ICU. This initial ARU patient treatment plan of care, together with the IPOC & the Education plan, form the foundation for the patient's plan of care. Weekly patient care conferences are held to evaluate progress towards the initial treatment plan & goals.     I have reviewed this initial plan of care and agree with its contents:    Title   Name    Date    Time    Physician: AMBER GeorgePMirandaH  PM&R  1/7/2023  7:14 AM      Case Mgmt: ARYAN Cheema 1/6/23  0849    OT: DAVION Capone/ANDRES 1/6/2023 15:58    PT: Clive Hollingsworth PT, DPT [id number] 1/6/23 1043    RN: Jan Brewer, KWABENA    ARU Supervisor: Des Lema, PT, DPT 1/6/2023 @ 6392

## 2023-01-05 NOTE — PLAN OF CARE
Problem: Discharge Planning  Goal: Discharge to home or other facility with appropriate resources  1/5/2023 0715 by Medina Greenwood RN  Outcome: Progressing     Problem: Pain  Goal: Verbalizes/displays adequate comfort level or baseline comfort level  1/5/2023 0715 by Medina Greenwood RN  Outcome: Progressing     Problem: Safety - Adult  Goal: Free from fall injury  1/5/2023 0715 by Medina Greenwood RN  Outcome: Progressing     Problem: ABCDS Injury Assessment  Goal: Absence of physical injury  1/5/2023 0715 by Medina Greenwood RN  Outcome: Progressing     Problem: Skin/Tissue Integrity  Goal: Absence of new skin breakdown  Description: 1. Monitor for areas of redness and/or skin breakdown  2. Assess vascular access sites hourly  3. Every 4-6 hours minimum:  Change oxygen saturation probe site  4. Every 4-6 hours:  If on nasal continuous positive airway pressure, respiratory therapy assess nares and determine need for appliance change or resting period.   1/5/2023 0715 by Medina Greenwood RN  Outcome: Progressing     Problem: Neurosensory - Adult  Goal: Achieves stable or improved neurological status  1/5/2023 0715 by Medina Greenwood RN  Outcome: Progressing     Problem: Nutrition Deficit:  Goal: Optimize nutritional status  Outcome: Progressing

## 2023-01-05 NOTE — PROGRESS NOTES
01/05/23 1526   Encounter Summary   Encounter Overview/Reason  Initial Encounter   Service Provided For: Patient   Referral/Consult From: Bernice   Last Encounter  01/05/23  (deena)   Complexity of Encounter Moderate   Begin Time 1415   End Time  1420   Total Time Calculated 5 min   Encounter    Type Initial Screen/Assessment   Assessment/Intervention/Outcome   Assessment Calm; Hopeful   Intervention Active listening;Nurtured Hope   Outcome Comfort;Expressed Gratitude; Refused/Declined   Staff Pema Mcdermott MA, [facility]

## 2023-01-05 NOTE — PLAN OF CARE
Problem: Discharge Planning  Goal: Discharge to home or other facility with appropriate resources  1/5/2023 1431 by Vince Henry RN  Outcome: Completed  1/5/2023 0715 by Vince Henry RN  Outcome: Progressing  1/5/2023 0608 by Marleny Fink RN  Outcome: Progressing  1/5/2023 0429 by Marleny Fink RN  Outcome: Not Progressing     Problem: Pain  Goal: Verbalizes/displays adequate comfort level or baseline comfort level  1/5/2023 1431 by Vince Henry RN  Outcome: Completed  1/5/2023 0715 by Vince Henry RN  Outcome: Progressing  1/5/2023 0608 by Marleny Fink RN  Outcome: Progressing  1/5/2023 0429 by Marleny Fink RN  Outcome: Not Progressing     Problem: Safety - Adult  Goal: Free from fall injury  1/5/2023 1431 by Vince Henry RN  Outcome: Completed  1/5/2023 0715 by Vince Henry RN  Outcome: Progressing  1/5/2023 0429 by Marleny Fink RN  Outcome: Not Progressing     Problem: ABCDS Injury Assessment  Goal: Absence of physical injury  1/5/2023 1431 by Vince Henry RN  Outcome: Completed  1/5/2023 0715 by Vince Henry RN  Outcome: Progressing  1/5/2023 0608 by Marleny Fink RN  Outcome: Progressing     Problem: Skin/Tissue Integrity  Goal: Absence of new skin breakdown  Description: 1. Monitor for areas of redness and/or skin breakdown  2. Assess vascular access sites hourly  3. Every 4-6 hours minimum:  Change oxygen saturation probe site  4. Every 4-6 hours:  If on nasal continuous positive airway pressure, respiratory therapy assess nares and determine need for appliance change or resting period.   1/5/2023 1431 by Vince Henry RN  Outcome: Completed  1/5/2023 0715 by Vince Henry RN  Outcome: Progressing  1/5/2023 9512 by Marleny Fink RN  Outcome: Progressing  1/5/2023 0429 by Marleny Fink RN  Outcome: Not Progressing     Problem: Neurosensory - Adult  Goal: Achieves stable or improved neurological status  1/5/2023 1431 by Vince Henry RN  Outcome: Completed  1/5/2023 0715 by Asad Odom RN  Outcome: Progressing  1/5/2023 0429 by Bradly Walker RN  Outcome: Not Progressing  Goal: Absence of seizures  Outcome: Completed  Goal: Remains free of injury related to seizures activity  Outcome: Completed  Goal: Achieves maximal functionality and self care  Outcome: Completed     Problem: Nutrition Deficit:  Goal: Optimize nutritional status  1/5/2023 1431 by Asad Odom RN  Outcome: Completed  1/5/2023 0715 by Asad Odom RN  Outcome: Progressing     Problem: Discharge Planning  Goal: Discharge to home or other facility with appropriate resources  1/5/2023 1431 by Asad Odom RN  Outcome: Completed  1/5/2023 0715 by Asad Odom RN  Outcome: Progressing  1/5/2023 0608 by Bradly Walker RN  Outcome: Progressing  1/5/2023 0429 by Bradly Walker RN  Outcome: Not Progressing     Problem: Pain  Goal: Verbalizes/displays adequate comfort level or baseline comfort level  1/5/2023 1431 by Asad Odom RN  Outcome: Completed  1/5/2023 0715 by Asad Odom RN  Outcome: Progressing  1/5/2023 0608 by Bradly Walker RN  Outcome: Progressing  1/5/2023 0429 by Bradly Walker RN  Outcome: Not Progressing     Problem: Safety - Adult  Goal: Free from fall injury  1/5/2023 1431 by Asad Odom RN  Outcome: Completed  1/5/2023 0715 by Asad Odom RN  Outcome: Progressing  1/5/2023 0429 by Bradly Walker RN  Outcome: Not Progressing     Problem: Skin/Tissue Integrity  Goal: Absence of new skin breakdown  Description: 1. Monitor for areas of redness and/or skin breakdown  2. Assess vascular access sites hourly  3. Every 4-6 hours minimum:  Change oxygen saturation probe site  4. Every 4-6 hours:  If on nasal continuous positive airway pressure, respiratory therapy assess nares and determine need for appliance change or resting period.   1/5/2023 1431 by Asad Odom RN  Outcome: Completed  1/5/2023 0715 by Asad Odom RN  Outcome: Progressing  1/5/2023 0608 by Karen Spain RN  Outcome: Progressing  1/5/2023 0429 by Karen Spain RN  Outcome: Not Progressing     Problem: Neurosensory - Adult  Goal: Achieves stable or improved neurological status  1/5/2023 1431 by Roxanna Quiles RN  Outcome: Completed  1/5/2023 0715 by Roxanna Quiles RN  Outcome: Progressing  1/5/2023 0429 by Karen Spain RN  Outcome: Not Progressing

## 2023-01-05 NOTE — PROGRESS NOTES
Physical Therapy  Daily Treatment Note    Discharge Recommendations: Segundo Martinez scored a 17/24 on the AM-PAC short mobility form. Current research shows that an AM-PAC score of 17 or less is typically not associated with a discharge to the patient's home setting. Based on the patient's AM-PAC score and their current functional mobility deficits, it is recommended that the patient have 5-7 sessions per week of Physical Therapy at d/c to increase the patient's independence. At this time, this patient demonstrates complex nursing, medical, and rehabilitative needs, and would benefit from intensive rehabilitation services upon discharge from the Inpatient setting. This patient demonstrates the ability to participate in and benefit from an intensive therapy program with a coordinated interdisciplinary team approach to foster frequent, structured, and documented communication among disciplines, who will work together to establish, prioritize, and achieve treatment goals. Please see assessment section for further patient specific details. Assessment:  Pt with good effort and participation. She seems very motivated to regain her strength and independence. Pt needing CGA for transfers/ambulation and up to [address] assist for bed mobility. Also needing cues for safety with transfers & when turning backing up to sit with walker. Limited by fatigue, weakness, decreased balance. Pt lives alone in mobile home. Has history of multiple falls--most recently resulting in injury. She would benefit from continued IP PT at D/C to improves strength and balance prior to returning home. Plan is for ARU. Equipment Needs: Defer to next level of care    Chart Reviewed: Yes     Other position/activity restrictions: no activity restrictions noted   Additional Pertinent Hx: Patient is an 82 y/o female admitted 12/31 s/p fall.   CT head (+) forNew left frontal subdural hematoma measuring up to 10 mm in maximal thickness without significant mass effect or midline shift. Stable 9 mm intraparenchymal hematoma within the anterior left frontal lobe. Stable multi foci of subarachnoid hemorrhage. - all stable on repeat exam.      Diagnosis: ICH   Treatment Diagnosis: impaired mobility associated with ICH    Subjective: Pt in chair initially. Agreeable to working with PT. Hopes to be D/Dave to ARU today. Pain: None at rest. C/o some R rib discomfort with activity. Tolerable at this time s/p Tylenol this AM, per pt report. \"It was bad earlier this morning. \"     Objective:    Transfers  Sit to stand: CGA from chair (twice); CGA from bed; CGA from commode with grab bar  Stand to sit: CGA into chair (twice); CGA onto bed; CGA onto commode with grab bar  Other: Pt needing cues for safety with each transfer (ex: keeping walker with her when turning/backing up to sit, pushing up/reaching back for sitting surface as opposed to holding onto walker.)    Ambulation  Assistance Level: CGA  Assistive device: Wheeled walker  Distance: 20 ft, 10 ft in room; 60 ft x 2 in halls  Quality of gait: Step-through pattern; decreased pace; decreased step height/length; no exaggerated weight shifting, but no LOB noted    Bed mobility  Supine to sit: Min assist, HOB flat. Effortful. Sit to Supine: CGA, HOB flat. Exercises  While seated in chair:   2 sets x 15 reps B heel raises, toe raises, LAQ, hip abd/add  2 sets x 10 reps B hip flexion   Other: Occasional rest breaks with exercises. Patient Education  Role of PT. Calling for assist with needs. Expressed understanding. Safety with transfers when using walker. Needs continued reinforcement/education. Safety Devices  Pt left with needs in reach. In chair with chair alarm on. LEs elevated on footstool. RN aware.      AM-PAC score  AM-PAC Inpatient Mobility Raw Score : 17  AM-PAC Inpatient T-Scale Score : 42.13  Mobility Inpatient CMS 0-100% Score: 50.57  Mobility Inpatient CMS G-Code Modifier : CK    Goals: (as determined and assessed by primary PT)  Time Frame for Short Term Goals: discharge  Short Term Goal 1: patient will perform bed mobility with SBA   Short Term Goal 2: patient will perform transfers sit<>stand with supervision   Short Term Goal 3: patient will ambulate 48' with FWW and supervision          Plan:  Plan: 5-7 times per week  Current Treatment Recommendations: Strengthening, Balance training, Gait training, Functional mobility training, Transfer training, Endurance training, Patient/Caregiver education & training, Therapeutic activities, Safety education & training    Therapy Time    Individual  Concurrent  Group  Co-treatment    Time In  842          Time Out  924            Minutes  42              Timed Code Treatment Minutes: 42  Total Treatment Minutes: 42    Will continue per plan of care. If patient is discharged prior to next treatment, this note will serve as the discharge summary.     Shekhar Cervantes #[id number]

## 2023-01-05 NOTE — DISCHARGE INSTR - COC
Continuity of Care Form    Patient Name: Jameson Morris   :  1935  MRN:  [id number]    Admit date:  2022  Discharge date:  2023    Code Status Order: Full Code   Advance Directives:     Admitting Physician:  Ana Boss MD  PCP: Dominique Banerjee    Discharging Nurse: Sigrid Odell [facility] Unit/Room#: 5048/5487-77  Discharging Unit Phone Number: [phone]    Emergency Contact:   Extended Emergency Contact Information  Primary Emergency Contact: Ken [address] Phone: [phone]  Mobile Phone: [phone]  Relation: Child  Secondary Emergency Contact: Shayna Richey  Address: [address] [place] [address], [address] [place] [address] Phone: [phone]  Mobile Phone: [phone]  Relation: Child    Past Surgical History:  Past Surgical History:   Procedure Laterality Date    APPENDECTOMY  1969    CAPSULE ENDOSCOPY N/A 2022    CAPSULE ENDOSCOPY performed by Orion Matos MD at [address] [place] [address] (CERVIX STATUS UNKNOWN)      JOINT REPLACEMENT Bilateral     bilateral TKR    NASAL SEPTUM SURGERY      DC ARTHRP KNE CONDYLE&PLATU MEDIAL&LAT COMPARTMENTS Right 2000    knee surgery    DC DILATION & CURETTAGE DX&/THER NONOBSTETRIC      D & C    DC VAGINAL HYSTERECTOMY UTERUS 250 GM/<  2004    Hysterectomy    TONSILLECTOMY      VEIN SURGERY      legs       Immunization History: There is no immunization history on file for this patient.     Active Problems:  Patient Active Problem List   Diagnosis Code    Vaginal atrophy N95.2    History of endometrial cancer Z85.42    Frequent UTI N39.0    H/O total hysterectomy with bilateral salpingo-oophorectomy (BSO) Z90.710, Z90.722, Z90.79    Acute intra-cranial hemorrhage (HCC) I62.9    Intracranial hemorrhage (HCC) I62.9    KYARA (acute kidney injury) ([facility] [address] N17.9    Normocytic anemia D64.9    Hypertension I10 Hyperlipidemia E78.5    COPD (chronic obstructive pulmonary disease) ([facility] J44.9       Isolation/Infection:   Isolation            No Isolation          Patient Infection Status       Infection Onset Added Last Indicated Last Indicated By Review Planned Expiration Resolved Resolved By    None active    Resolved    COVID-19 (Rule Out) 12/30/22 12/30/22 12/30/22 COVID-19, Rapid (Ordered)   12/30/22 Rule-Out Test Resulted            Nurse Assessment:  Last Vital Signs: /74   Pulse 57   Temp 98 °F (36.7 °C) (Oral)   Resp 16   Ht 5' 0.98\" (1.549 m)   Wt 168 lb 14 oz (76.6 kg)   SpO2 98%   BMI 31.92 kg/m²     Last documented pain score (0-10 scale): Pain Level: 0  Last Weight:   Wt Readings from Last 1 Encounters:   01/04/23 168 lb 14 oz (76.6 kg)     Mental Status:  orientedx4    IV Access:  - Peripheral IV - site  R Cephalic, insertion date: 1/3/2023    Nursing Mobility/ADLs:  Walking   Assisted  Transfer  Assisted  Bathing  Assisted  Dressing  Assisted  Toileting  Assisted  Feeding  Independent  Med Admin  Assisted  Med Delivery   whole    Wound Care Documentation and Therapy:  Wound 12/30/22 Head Left;Upper swollen forehead hematoma with old bloody drainage and a piece of tape drsg on the bleeding site (Active)   Wound Etiology Traumatic 01/05/23 0800   Dressing Status Intact; Old drainage noted 01/05/23 0800   Wound Cleansed Soap and water 01/05/23 0411   Dressing/Treatment Open to air 01/05/23 0800   Wound Assessment Dry;Superficial 01/05/23 0800   Drainage Amount None 01/05/23 0800   Odor None 01/05/23 0800   Mayra-wound Assessment Ecchymosis 01/05/23 0800   Margins Attached edges 01/05/23 0800   Number of days: 6        Elimination:  Continence:    Bowel: Yes  Bladder: Yes  Urinary Catheter: None   Colostomy/Ileostomy/Ileal Conduit: No       Date of Last BM: 1/5/2023    Intake/Output Summary (Last 24 hours) at 1/5/2023 1022  Last data filed at 1/5/2023 0942  Gross per 24 hour   Intake 1140 ml   Output -- Net 1140 ml     I/O last 3 completed shifts: In: 940 [P.O.:940]  Out: 900 [Urine:900]    Safety Concerns:     History of Falls (last 30 days) and At Risk for Falls    Impairments/Disabilities:      None    Nutrition Therapy:  Current Nutrition Therapy:   - Oral Diet:  General, low fat, low chol, high fiber, 2 gm Na    Routes of Feeding: Oral  Liquids: Thin Liquids  Daily Fluid Restriction: no  Last Modified Barium Swallow with Video (Video Swallowing Test): not done    Treatments at the Time of Hospital Discharge:   Respiratory Treatments:   Oxygen Therapy:  is not on home oxygen therapy. Ventilator:    - No ventilator support    Rehab Therapies: Physical Therapy and Occupational Therapy  Weight Bearing Status/Restrictions: No weight bearing restrictions  Other Medical Equipment (for information only, NOT a DME order):  walker  Other Treatments:     Patient's personal belongings (please select all that are sent with patient):  Glasses, cell phone, clothing, packet belonging bag    RN SIGNATURE:  Electronically signed by Dianne Quan RN on 1/5/23 at 2:37 PM EST    CASE MANAGEMENT/SOCIAL WORK SECTION    Inpatient Status Date: ***    Readmission Risk Assessment Score:  Readmission Risk              Risk of Unplanned Readmission:  14           Discharging to Facility/ Agency   Name:   Address:  Phone:  Fax:    Dialysis Facility (if applicable)   Name:  Address:  Dialysis Schedule:  Phone:  Fax:    / signature: {Esignature:[id number]}    PHYSICIAN SECTION    Prognosis: Good    Condition at Discharge: Stable    Rehab Potential (if transferring to Rehab): Good    Recommended Labs or Other Treatments After Discharge:   Routine labs    Physician Certification: I certify the above information and transfer of Shahid Seals  is necessary for the continuing treatment of the diagnosis listed and that she requires Acute Rehab for greater or less than 30 days.      Update Admission H&P: Changes in H&P as follows - adjustment in medications hold anticoagulant    PHYSICIAN SIGNATURE:  Electronically signed by Marilee Lopez MD on 1/5/23 at 10:23 AM EST

## 2023-01-05 NOTE — DISCHARGE SUMMARY
Hospital Discharge Summary    Patient's PCP: Love Gifford  Admit Date: 12/31/2022   Discharge Date: 1/5/2023    Admitting Physician: Dr. Eliud Jett MD  Discharge Physician: Dr. Marianna Albrecht MD   Consults: neurosurgery pt/ot    HPI: 82 yo admitted with fall  Brief hospital course:  Given the concern of the patients presentation and the concern of the possible multi-factorial etiology contributing to patients symptomatology. Patient was admitted and evaluated and found to have fall secondary to perhaps hypotension. The patient was on thiazide diuretic and torsemide at home, she was also on norvasc. She states her edema has largely resolved, the fall however resulted in traumatic hemorrhagic contusion and SAH. 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 She will follow up with neursurgery for the aneurysm however for now her asa is being held for two weeks. Her antihypertensives have been reduced and she is feeling great, she was orthostatic initially that has resolved. She is asked to monitor for recurrence of symptoms or new symptoms including but not limited to chest pain shortness of breath nausea vomiting fevers or chills and seek immediate medical attention or call 911.       Discharge Diagnoses:   Patient Active Problem List   Diagnosis    Vaginal atrophy    History of endometrial cancer    Frequent UTI    H/O total hysterectomy with bilateral salpingo-oophorectomy (BSO)    Acute intra-cranial hemorrhage (HCC)    Intracranial hemorrhage (HCC)    KYARA (acute kidney injury) ([facility] [address]    Normocytic anemia    Hypertension    Hyperlipidemia    COPD (chronic obstructive pulmonary disease) (HCC)       Physical Exam: BP (!) 102/40   Pulse 51   Temp 97.5 °F (36.4 °C) (Oral)   Resp 16   Ht 5' 0.98\" (1.549 m)   Wt 169 lb 3.2 oz (76.7 kg)   SpO2 99%   BMI 31.99 kg/m²     No results for input(s): POCGLU in the last 72 hours. BP (!) 102/40   Pulse 51   Temp 97.5 °F (36.4 °C) (Oral)   Resp 16   Ht 5' 0.98\" (1.549 m)   Wt 169 lb 3.2 oz (76.7 kg)   SpO2 99%   BMI 31.99 kg/m²   General appearance: alert, appears stated age, and cooperative  Head: Normocephalic, without obvious abnormality, atraumatic  Neck: no adenopathy, no carotid bruit, no JVD, supple, symmetrical, trachea midline, and thyroid not enlarged, symmetric, no tenderness/mass/nodules  Lungs: clear to auscultation bilaterally  Heart:  s2s1 sinus,   Abdomen: soft, non-tender; bowel sounds normal; no masses,  no organomegaly  Extremities: extremities normal, atraumatic, no cyanosis or edema  Pulses: 2+ and symmetric    LABS:  Recent Labs     01/03/23  0541   WBC 3.9*   HGB 7.6*   *      Recent Labs     01/03/23  0541   *   K 3.9      CO2 27   BUN 18   CREATININE 1.5*   GLUCOSE 86     No results for input(s): INR in the last 72 hours. Discharge Medications:     Medication List        START taking these medications      budesonide 0.5 MG/2ML nebulizer suspension  Commonly known as: PULMICORT  Take 2 mLs by nebulization in the morning and 2 mLs in the evening.      levETIRAcetam 500 MG tablet  Commonly known as: KEPPRA  Take 1 tablet by mouth 2 times daily for 14 days            CHANGE how you take these medications      amiodarone 100 MG tablet  Commonly known as: PACERONE  Take 1 tablet by mouth daily  Start taking on: January 6, 2023  What changed:   medication strength  how much to take            CONTINUE taking these medications      allopurinol 100 MG tablet  Commonly known as: ZYLOPRIM     amitriptyline 50 MG tablet  Commonly known as: ELAVIL     estradiol 0.1 MG/GM vaginal cream  Commonly known as: ESTRACE     levothyroxine 75 MCG tablet  Commonly known as: SYNTHROID     montelukast 10 MG tablet  Commonly known as: SINGULAIR     simvastatin 20 MG tablet  Commonly known as: ZOCOR     vitamin D 50 MCG (2000 UT) Caps capsule STOP taking these medications      aspirin 81 MG EC tablet     torsemide 20 MG tablet  Commonly known as: DEMADEX               Where to Get Your Medications        These medications were sent to [facility], [address] [facility]. Dina Aguilar [phone] Anna Cinthia [phone]  [address] [address]      Phone: [phone]   budesonide 0.5 MG/2ML nebulizer suspension  levETIRAcetam 500 MG tablet       Information about where to get these medications is not yet available    Ask your nurse or doctor about these medications  amiodarone 100 MG tablet        Activity: activity as tolerated  Diet: cardiac diet  Disposition: home  Discharged Condition: Stable  Follow Up: Primary Care Physician in one week    Total time spent on discharge, finalizing medications, referrals and arranging outpatient follow up was more than 30 minutes    Thank you Dr. Nancy Powell for the opportunity to be involved in this patients care. If you have any questions or concerns please feel free to contact me at [phone].

## 2023-01-05 NOTE — PROGRESS NOTES
[facility] Admission Assessment    Ethnicity  \"Are you of , /a, or [place]ian origin? \"  Check all that apply:  [x] A. No, not of , /a, or [place] Origin  [] B.  Yes, [place], [place] American, Chicano/a  [] C.  Yes, [address] [place] [address]  [] D.  Yes, [place]  [] E.  Yes, another , , or Ghanaian origin  [] X. Patient unable to respond  [] Y. Patient declines to respond    Race  \"What is your race? \"  Check all that apply:  [x] A. White  [] B. Black or   [] C. American [place] [facility] or [place] Native  [] D.  [place] [facility]  [] E. [place]  [] F. Spanish  [] G. [place]  [] Cathyann Coca  [] I. [place]  [] J.  Other   [] K.   [] L. Rwandan or [place]  [] M. Niuean  [] N. Other [place]  [] X. Patient unable to respond  [] Y. Patient declines to respond  [] Z. None of the above    Language  A. \"What is your preferred language? \"   English    B. \"Do you need or want an  to communicate with a doctor or health care staff? \"  Check only one:  [x] 0. No  [] 1. Yes  [] 9. Unable to determine    Transportation  \"Has lack of transportation kept you from medical appointments, meetings, work, or from getting things needed for daily living? \"Check all that apply:  [] A.  Yes, it has kept me from medical appointments or from getting my medications  [] B.  Yes, it has kept me from non-medical meetings, appointments, work, or from getting things that I need  [x] C.  No  [] X. Patient unable to respond  [] Y. Patient declines to respond    Hearing  Ability to hear (with hearing aid or hearing appliances if normally used)  []  0. Adequate - no difficulty in normal conversation, social interaction, listening to TV  [x]  1. Minimal difficulty - difficulty in some environments (e.g. when person speaks softly or setting is noisy)  []  2. Moderate difficulty - speaker has to increase volume and speak distinctly   []  3.   Highly impaired - absence of useful hearing    Vision  Ability to see in adequate light (with glasses or other visual appliances)  []  0. Adequate - sees fine detail, such as regular print in newspapers/books  [x]  1. Impaired - sees large print, but not regular print in newspapers/books  []  2. Moderately impaired - limited vision; not able to see newspaper headlines but can identify objects  []  3. Highly impaired - object identification in question, but eyes appear to follow objects  []  4. Severely impaired - no vision or sees only light, colors, or shapes; eyes do not appear to follow objects    Health Literacy  \"How often do you need to have someone help you when you read instructions, pamphlets, or other written material from your doctor or pharmacy? \"  [x]  0. Never  []  1. Rarely  []  2. Sometimes  []  3. Often  []  4. Always  []  8. Patient unable to respond    BIMS - **Must be completed in the flowsheet at admission prior to proceeding with Delirium Assessment**  [x] BIMS completed in flowsheet at admission    Signs and Symptoms of Delirium  A. Acute Onset Mental Status Change - Is there evidence of an acute change in mental status from the patient's baseline? [x] 0. No  [] 1. Yes    B. Inattention - Did the patient have difficulty focusing attention, for example being easily distractible or having difficulty keeping track of what was being said? [x]  0. Behavior not present  []  1. Behavior continuously present, does not fluctuate  []  2. Behavior present, fluctuates (comes and goes, changes in severity)    C. Disorganized thinking - Was the patient's thinking disorganized or incoherent (rambling or irrelevant conversation, unclear or illogical flow of ideas, or unpredictable switching from subject to subject)? [x]  0. Behavior not present  []  1. Behavior continuously present, does not fluctuate  []  2. Behavior present, fluctuates (comes and goes, changes in severity)    D.   Altered level of consciousness - Did the patient have altered level of consciousness as indicated by any of the following criteria? Vigilant - startled easily to any sound or touch  Lethargic - repeatedly dozed off while being asked questions, but responded to voice or touch  Stuporous - very difficulty to arouse and keep aroused for the interview  Comatose - could not be aroused  []  0. Behavior not present  []  1. Behavior continuously present, does not fluctuate  []  2. Behavior present, fluctuates (comes and goes, changes in severity)    Mood    \"Over the last 2 weeks, have you been bothered by any of the following problems?\" 1. Symptom Presence    0 = No  1 = Yes  9 = No Response 2. Symptom Frequency    0 = Never or 1 day  1 = 2-6 days (several days)  2 = 7-11 days (half or more of the days)  3 = 12-14 days (nearly every day)  **Leave blank if 'No Reponse'**      Enter scores in boxes    Column 1 Column 2   Little interest or pleasure in doing things   0    Feeling down, depressed, or hopeless   0    **If either A or B in column 2 is coded 2 or 3, CONTINUE asking the questions below. If not, END the interview. **     Trouble falling or staying asleep, or sleeping too much       Feeling tired or having little energy       Poor appetite or overeating       Feeling bad about yourself - or that you are a failure or have let yourself or your family down       Trouble concentrating on things, such as reading the newspaper or watching television       Moving or speaking so slowly that other people could have noticed. Or the opposite- being so fidgety or restless that you have been moving around a lot more than usual.       Thoughts that you would be better off dead, or of hurting yourself in some way. Total Severity: Add scores for all frequency responses in column 2 (possible score 0-27, or enter 99 if unable to complete (if symptom frequency (column 2) is blank for 3 or more items).         Social Isolation  \"How often do you feel lonely or isolated from those around you? \"  [x] 0. Never  [] 1. Rarely  [] 2. Sometimes  [] 3. Often  [] 4. Always  [] 7. Patient declines to respond  [] 8. Patient unable to respond    Pain Effect on Sleep  \"Over the past 5 days, how much of the time has pain made it hard for you to sleep at night? \"  []  0. Does not apply - I have not had any pain or hurting in the past 5 days  [x]  1. Rarely or not at all  []  2. Occasionally  []  3. Frequently  []  4. Almost constantly  []  8. Unable to answer    **If the patient answers \"0. Does not apply\" to this question, skip the next two \"Pain Effect. Erleen Mcghee Erleen Mcghee \" questions**    Pain Interference with Therapy Activities  \"Over the past 5 days, how often have you limited your participation in rehabilitation therapy sessions due to pain? \"  []  0. Does not apply - I have not received rehabilitation therapy in the past 5 days  [x]  1. Rarely or not at all  []  2. Occasionally  []  3. Frequently  []  4. Almost constantly  []  8. Unable to answer    Pain Interference with Day-to-Day Activities: \"Over the past 5 days, how often have you limited your day-to-day activities (excluding rehabilitation therapy session)? \"  [x]  1. Rarely or not at all  []  2. Occasionally  []  3. Frequently  []  4. Almost constantly  []  8. Unable to answer    Nutritional Approaches  Check all of the following nutritional approaches that apply on admission:  []  A. Parenteral/IV feeding (including IV fluids if needed for hydration, but not as part of dialysis/chemo)  []  B. Feeding tube (e.g., nasogastric or abdominal (PEG))  []  C. Mechanically altered diet - requires change in texture of food or liquids (e.g., pureed food, thickened liquids)  []  D. Therapeutic diet (e.g., low salt, diabetic, low cholesterol)  [x]  Z.   None of the above    High Risk Drug Classes:  Use and Indication    Is taking: Check if the pt is taking any medications by pharmacological classification, not how it is used, in the following classes  Indication noted: If column 1 is checked, check if there is an indication noted for all meds in the drug class Is taking  (check all that apply) Indication noted (check all that apply)   Antipsychotic [] []   Anticoagulant [] []   Antibiotic [] []   Opioid [] []   Antiplatelet [] []   Hypoglycemic (including insulin) [] []   None of the above [x]     Special Treatments, Procedures, and Programs    Check all of the following treatments, procedures, and programs that apply on admission. On admission (check all that apply)   Cancer Treatments   A1. Chemotherapy []           A2. IV []           A3. Oral []           A10. Other []   B1. Radiation []   Respiratory Therapies   C1. Oxygen Therapy []           C2. Continuous (continuously for at least 14 hours per day) []           C3. Intermittent [x]           C4. High-concentration []   D1. Suctioning (Does not include oral suctioning) []           D2. Scheduled []           D3. As needed []   E1. Tracheostomy Care []   F1. Invasive Mechanical Ventilator (ventilator or respirator) []   G1. Non-invasive Mechanical Ventilator []           G2. BiPAP []           G3. CPAP []   Other   H1. IV Medications (Do not include sub Q pumps, flushes, Dextrose 50% or lactated ringers) []           H2. Vasoactive medications []           H3. Antibiotics []           H4. Anticoagulation []           H10. Other []   I1. Transfusions []   J1. Dialysis []           J2. Hemodialysis []           J3. Peritoneal dialysis []   O1. IV access (including a catheter in a vein) []           O2. Peripheral [x]           O3. Midline []           O4. Central (PICC, tunneled, port) []      None of the above (select if no Cancer, Respiratory, or Other boxes are checked) []     The above items have been reviewed and updated as necessary, and are accurate for the admission assessment period.     Assessing/Reviewing RN: Rambo Cardona    Assessing/Reviewing RN: Jonathan Bobo [facility] RN

## 2023-01-05 NOTE — PROGRESS NOTES
Patient A&O X 4. VSS. No new Neuro changes during this shift. No c/o of pain. Up with walker and gait belt X 4. Patient had diarrhea x 3 and states she have chronic diarrhea, PRN imodium ordered . All fall precaution in place. Will continue to monitor.

## 2023-01-05 NOTE — PROGRESS NOTES
Patient seen and examined  She is doing well she is feeling better no headache no light headedness no nausea or vomiting  No fevers or chills  No chest pain or shortness of breath no light headedness    Vitals:    01/04/23 2315 01/05/23 0302 01/05/23 0715 01/05/23 0824   BP: 104/61 106/60 116/62 114/74   Pulse: 58 58 52 57   Resp: 16 16 16    Temp: 98 °F (36.7 °C) 97.8 °F (36.6 °C) 98 °F (36.7 °C)    TempSrc: Oral Oral Oral    SpO2: 98% 98% 98%    Weight:       Height:          Gen: pleasant alert oriented  Neck: no jvd  Chest: clear bilaterally  Cvs: s2s1 sinus bradycardia  Abd: soft nd nt bs normal active  Ext: no edema positive pulses    Tsh 5.73 t4 is 1.5  82 yo female admitted with ICH after fall, arf, anemia. Currently patient is doing great her bp is lower end but without symptoms, she is feeling well and would be a good candidate to go to acute rehab unit as she is otherwise independent. D/w social work will do peer to peer w insurance company I called yesterday and did not receive a call back will re attempt today.

## 2023-01-06 PROBLEM — E46 MALNUTRITION (HCC): Chronic | Status: ACTIVE | Noted: 2023-01-06

## 2023-01-06 LAB
ANION GAP SERPL CALCULATED.3IONS-SCNC: 9 MMOL/L (ref 3–16)
BASOPHILS ABSOLUTE: 0.1 K/UL (ref 0–0.2)
BASOPHILS RELATIVE PERCENT: 1.6 %
BUN BLDV-MCNC: 15 MG/DL (ref 7–20)
CALCIUM SERPL-MCNC: 8.4 MG/DL (ref 8.3–10.6)
CHLORIDE BLD-SCNC: 102 MMOL/L (ref 99–110)
CO2: 28 MMOL/L (ref 21–32)
CREAT SERPL-MCNC: 1.1 MG/DL (ref 0.6–1.2)
EOSINOPHILS ABSOLUTE: 0.2 K/UL (ref 0–0.6)
EOSINOPHILS RELATIVE PERCENT: 4.9 %
GFR SERPL CREATININE-BSD FRML MDRD: 49 ML/MIN/{1.73_M2}
GLUCOSE BLD-MCNC: 82 MG/DL (ref 70–99)
HCT VFR BLD CALC: 24.8 % (ref 36–48)
HEMOGLOBIN: 8 G/DL (ref 12–16)
LYMPHOCYTES ABSOLUTE: 0.9 K/UL (ref 1–5.1)
LYMPHOCYTES RELATIVE PERCENT: 28.2 %
MCH RBC QN AUTO: 29.5 PG (ref 26–34)
MCHC RBC AUTO-ENTMCNC: 32.1 G/DL (ref 31–36)
MCV RBC AUTO: 92 FL (ref 80–100)
MONOCYTES ABSOLUTE: 0.4 K/UL (ref 0–1.3)
MONOCYTES RELATIVE PERCENT: 13.1 %
NEUTROPHILS ABSOLUTE: 1.7 K/UL (ref 1.7–7.7)
NEUTROPHILS RELATIVE PERCENT: 52.2 %
PDW BLD-RTO: 17 % (ref 12.4–15.4)
PLATELET # BLD: 150 K/UL (ref 135–450)
PMV BLD AUTO: 9.5 FL (ref 5–10.5)
POTASSIUM REFLEX MAGNESIUM: 4.8 MMOL/L (ref 3.5–5.1)
RBC # BLD: 2.7 M/UL (ref 4–5.2)
SODIUM BLD-SCNC: 139 MMOL/L (ref 136–145)
WBC # BLD: 3.2 K/UL (ref 4–11)

## 2023-01-06 PROCEDURE — 94150 VITAL CAPACITY TEST: CPT

## 2023-01-06 PROCEDURE — 92523 SPEECH SOUND LANG COMPREHEN: CPT

## 2023-01-06 PROCEDURE — 97535 SELF CARE MNGMENT TRAINING: CPT

## 2023-01-06 PROCEDURE — 97166 OT EVAL MOD COMPLEX 45 MIN: CPT

## 2023-01-06 PROCEDURE — 80048 BASIC METABOLIC PNL TOTAL CA: CPT

## 2023-01-06 PROCEDURE — 1280000000 HC REHAB R&B

## 2023-01-06 PROCEDURE — 94761 N-INVAS EAR/PLS OXIMETRY MLT: CPT

## 2023-01-06 PROCEDURE — 97162 PT EVAL MOD COMPLEX 30 MIN: CPT

## 2023-01-06 PROCEDURE — 36415 COLL VENOUS BLD VENIPUNCTURE: CPT

## 2023-01-06 PROCEDURE — 2580000003 HC RX 258: Performed by: PHYSICAL MEDICINE & REHABILITATION

## 2023-01-06 PROCEDURE — 97116 GAIT TRAINING THERAPY: CPT

## 2023-01-06 PROCEDURE — 97530 THERAPEUTIC ACTIVITIES: CPT

## 2023-01-06 PROCEDURE — 6370000000 HC RX 637 (ALT 250 FOR IP): Performed by: PHYSICAL MEDICINE & REHABILITATION

## 2023-01-06 PROCEDURE — 85025 COMPLETE CBC W/AUTO DIFF WBC: CPT

## 2023-01-06 PROCEDURE — 99222 1ST HOSP IP/OBS MODERATE 55: CPT | Performed by: PHYSICAL MEDICINE & REHABILITATION

## 2023-01-06 RX ORDER — SODIUM CHLORIDE 0.9 % (FLUSH) 0.9 %
5-40 SYRINGE (ML) INJECTION 2 TIMES DAILY
Status: DISCONTINUED | OUTPATIENT
Start: 2023-01-06 | End: 2023-01-13 | Stop reason: HOSPADM

## 2023-01-06 RX ADMIN — SODIUM CHLORIDE, PRESERVATIVE FREE 10 ML: 5 INJECTION INTRAVENOUS at 20:31

## 2023-01-06 RX ADMIN — LEVOTHYROXINE SODIUM 75 MCG: 0.07 TABLET ORAL at 06:00

## 2023-01-06 RX ADMIN — ATORVASTATIN CALCIUM 10 MG: 10 TABLET, FILM COATED ORAL at 08:14

## 2023-01-06 RX ADMIN — AMIODARONE HYDROCHLORIDE 100 MG: 200 TABLET ORAL at 08:14

## 2023-01-06 RX ADMIN — ACETAMINOPHEN 650 MG: 325 TABLET ORAL at 06:00

## 2023-01-06 RX ADMIN — LEVETIRACETAM 500 MG: 500 TABLET, FILM COATED ORAL at 08:14

## 2023-01-06 RX ADMIN — SODIUM CHLORIDE, PRESERVATIVE FREE 10 ML: 5 INJECTION INTRAVENOUS at 08:29

## 2023-01-06 RX ADMIN — MONTELUKAST 10 MG: 10 TABLET, FILM COATED ORAL at 08:15

## 2023-01-06 RX ADMIN — ALLOPURINOL 100 MG: 100 TABLET ORAL at 08:14

## 2023-01-06 RX ADMIN — LEVETIRACETAM 500 MG: 500 TABLET, FILM COATED ORAL at 20:31

## 2023-01-06 ASSESSMENT — PAIN DESCRIPTION - LOCATION
LOCATION: HEAD
LOCATION: HEAD

## 2023-01-06 ASSESSMENT — PAIN DESCRIPTION - DESCRIPTORS
DESCRIPTORS: ACHING
DESCRIPTORS: ACHING

## 2023-01-06 ASSESSMENT — PAIN SCALES - WONG BAKER: WONGBAKER_NUMERICALRESPONSE: 0

## 2023-01-06 ASSESSMENT — PAIN DESCRIPTION - PAIN TYPE: TYPE: ACUTE PAIN

## 2023-01-06 ASSESSMENT — PAIN - FUNCTIONAL ASSESSMENT: PAIN_FUNCTIONAL_ASSESSMENT: ACTIVITIES ARE NOT PREVENTED

## 2023-01-06 ASSESSMENT — PAIN DESCRIPTION - ORIENTATION
ORIENTATION: ANTERIOR
ORIENTATION: ANTERIOR

## 2023-01-06 ASSESSMENT — PAIN SCALES - GENERAL
PAINLEVEL_OUTOF10: 2
PAINLEVEL_OUTOF10: 2

## 2023-01-06 NOTE — PROGRESS NOTES
Patient refused [place] and Pulmicort. Patient states she takes all inhalers at home as needed. Only took [place] and Pulmicort when she was sick with RSV.

## 2023-01-06 NOTE — PROGRESS NOTES
Patient alert and oriented x4. With stable VS. Able to transfer using walker. Walks around the room with the walker. Admission question was answered. Noted to have wounds on head. Fall precautions due to weakness. Call light within reach. Calls appropriately when in need of assistance in transferring. Due meds given as ordered. Needs attended. Handed off to incoming nurse.

## 2023-01-06 NOTE — PROGRESS NOTES
Physical Therapy  Facility/Department: [facility] ACUTE REHAB UNIT  Rehabilitation Physical Therapy Initial Assessment    NAME: Chelsey Avila  : 1935 (29 y.o.)  MRN: [id number]  CODE STATUS: Full Code    Date of Service: 23      Past Medical History:   Diagnosis Date    Asthma     Cancer ([facility] [address]     skin cancer basal and squamous    History of blood transfusion     Hyperlipidemia     Hypertension     Leg swelling     Osteoarthritis     UTI symptoms     Venous insufficiency of leg      Past Surgical History:   Procedure Laterality Date    APPENDECTOMY      CAPSULE ENDOSCOPY N/A 2022    CAPSULE ENDOSCOPY performed by Tracey Broderikc MD at [facility] (CERVIX STATUS UNKNOWN)      JOINT REPLACEMENT Bilateral     bilateral TKR    NASAL SEPTUM SURGERY      CT ARTHRP KNE CONDYLE&PLATU MEDIAL&LAT COMPARTMENTS Right     knee surgery    CT DILATION & CURETTAGE DX&/THER NONOBSTETRIC      D & C    CT VAGINAL HYSTERECTOMY UTERUS 250 GM/<      Hysterectomy    TONSILLECTOMY      VEIN SURGERY      legs       Chart Reviewed: Yes  Patient assessed for rehabilitation services?: Yes  Additional Pertinent Hx: Patient is an 80 y/o female admitted  s/p fall. CT head (+) forNew left frontal subdural hematoma measuring up to 10 mm in maximal thickness without significant mass effect or midline shift. Stable 9 mm intraparenchymal hematoma within the anterior left frontal lobe.      Stable multi foci of subarachnoid hemorrhage. - all stable on repeat exam.  Family / Caregiver Present: No  Referring Practitioner: DO Roger  Diagnosis: ICH  General Comment  Comments: Pt seated in recliner upon approach and agreeable to PT    Restrictions:  Position Activity Restriction  Other position/activity restrictions: no activity restrictions noted     SUBJECTIVE  Subjective: Pt reports she had a HA earlier but denies pain currently    Prior Level of Function:  Social/Functional History  Lives With: Alone (dog)  Type of Home: Mobile home  Home Layout: One level  Home Access: Ramped entrance, Stairs to enter with rails  Entrance Stairs - Number of Steps: uses ramp to enter and 4 steps with B HR to exit  Entrance Stairs - Rails: Both  Bathroom Shower/Tub: Walk-in shower, Shower chair with back  Bathroom Toilet: Handicap height (sink for leverage)  Bathroom Equipment: Hand-held shower, Shower chair, Grab bars in shower  Home Equipment: Walker, rolling, Walker, 4 wheeled, Leg , Sock aid, Reacher, Wheelchair-manual (HR on bed)  Has the patient had two or more falls in the past year or any fall with injury in the past year?: Yes (reports 5 falls in month leading up to last fall, looses balance walking to BR without device / with slippery socks on/ slip on rug)  ADL Assistance: Independent  Homemaking Assistance: Independent  Homemaking Responsibilities: Yes (COA aide recently started assisting for 3 hours once a week with dusting and vaccuuming, cooks)  Ambulation Assistance: Independent (In home: RW, cane, HR in hallway; community: RW)  Transfer Assistance: Independent  Active : Yes  Mode of Transportation: Car  Occupation: Retired  Type of Occupation: elementary   Leisure & Hobbies: play bingo, play cards at park, go out to eat  Additional Comments: reports baseline difficulty with RLE and uses leg  to get into car; pt reports son lives in same mobile home park and daughter lives closeby who works from home and could stay with her if necessary and has multiple friends who can check in; gives some conflicting information concerning which device she uses in home/ if she uses a device in home      OBJECTIVE  Vision  Vision: Impaired  Vision Exceptions: Wears glasses for reading    Hearing  Hearing: Within functional limits    Cognition  Overall Cognitive Status: WFL  Cognition Comment: decreased short term memory noted - gives some conflicting information concerning which device she uses in home/ if she uses a device in home, also says \"I think I was telling you_____\" multiple times when talking about something she had not talked about with PT previously    ROM  AROM RLE (degrees)  RLE AROM: WFL  AROM LLE (degrees)  LLE AROM : WFL    Strength  Strength RLE  Strength RLE: WFL  Comment: 3+/5 hip flexion, 4+/5 knee extension/PF, 4/5 DF; reports decreased R LE strength since back surgery  Strength LLE  Strength LLE: WFL  Comment: 4/5 hip flexion/knee extension/DF, 4+/5 PF    Functional Mobility  Bed mobility  Rolling to Left: Supervision  Rolling to Right: Supervision  Supine to Sit: Stand by assistance  Sit to Supine: Stand by assistance  Scooting: Stand by assistance (seated EOB)  Bed Mobility Comments: compelted with HOB flat and use of HR, pt has HR on bed at home  Transfers  Sit to Stand: Contact guard assistance (at recliner/ EOB/ wc/ commode with RW, VC for hand placement)  Stand to Sit: Contact guard assistance (at recliner/ EOB/ wc/ commode with RW, VC for hand placement)  Car Transfer: Contact guard assistance (wc<>car without device, increased time and effort required to lift R LE into car)  Balance  Sitting - Static: Good;- (supv EOB/commode)  Sitting - Dynamic: Good;- (supv EOB/commode)  Standing - Static: Fair (CGA without device)  Standing - Dynamic: Fair;- (CGA for brief/pants management, hand washing without UE support, CGA for amb with RW)  Comments: Pt picks up sock off floor with UL UE support on RW and CGA    Environmental Mobility  Ambulation  Surface: Level tile  Device: Rolling Walker  Assistance: Contact guard assistance  Quality of Gait: slight lateral sway with each step, 1 L LOB which she is able to correct with CGA; tends to abandon walker when gets close to chair to sit/goes to sink to wash hands  Gait Deviations: Slow Sandra;Decreased step length;Decreased step height  Distance: 15', 200' (including ascent/descent of 15' ramp), 75', 10'  Stairs/Curb  Stairs?: Yes  Stairs  # Steps : 12  Rails: Bilateral  Assistance: Contact guard assistance  Comment: VC for RW management and upright posture, VC to keep both hands on RW instead of reaching for HR with 1 hand on compeltion of ramp         ASSESSMENT       Activity Tolerance  Activity Tolerance: Patient tolerated evaluation without incident;Patient limited by endurance    Assessment  Assessment: Pt presents to [facility] s/p multiple falls at home with most recent fall resulting in [address]. Pt currently requires CGA for transfers and ambualtion with safety cues required. Normally home alone with intermittant use of devices - reports 1 fall occured when she was not using device. Pt would benefit from continued skilled PT in order to improve functiona lmobility and independence for safer discharge home. Treatment Diagnosis: decreased functional mobility  Therapy Prognosis: Good  Decision Making: Medium Complexity  PT D/C Equipment  Other: pt owns cane/RW/rollator  PT Equipment Recommendations  Equipment Needed: No  Other: pt owns cane/RW/rollator    CLINICAL IMPRESSION   Pt presents to [facility] s/p multiple falls at home with most recent fall resulting in [address]. Pt currently requires CGA for transfers and ambualtion with safety cues required. Normally home alone with intermittant use of devices - reports 1 fall occured when she was not using device. Pt would benefit from continued skilled PT in order to improve functiona lmobility and independence for safer discharge home.     GOALS  Patient Goals   Patient Goals : to go home  Short Term Goals  Time Frame for Short Term Goals: 10 days  Short Term Goal 1: pt will perform bed mobility independently  Short Term Goal 2: pt will perform sit<>stand with LRAD and mod I  Short Term Goal 3: pt will ambulate 150' with LRAD and mod I  Short Term Goal 4: Pt will complete ascent/descent of 10' ramp with LRAD and mod I  Short Term Goal 5: Pt will compelte ascent/descent of 4 steps with B HR and mod I    PLAN OF CARE  Frequency: 1-2 treatment sessions per day, 5-7 days per week  Physcial Therapy Plan  Days Per Week: 6 Days  Hours Per Day: 1 hour  Therapy Duration: 10 Days  Current Treatment Recommendations: Strengthening;Balance training;Gait training;Functional mobility training;Transfer training; Endurance training;Patient/Caregiver education & training; Therapeutic activities; Safety education & training  Safety Devices  Type of Devices: Call light within reach;Nurse notified;Gait belt; Chair alarm in place; Left in chair    EDUCATION  Education  Education Given To: Patient  Education Provided: Role of Therapy;Plan of Care;Safety;Transfer Training;Mobility Training  Education Method: Verbal  Barriers to Learning: Cognition (decreased short term memory)  Education Outcome: Verbalized understanding;Continued education needed        Therapy Time   Individual Concurrent Group Co-treatment   Time In  0830         Time Out  0930         Minutes  60         Time Coded Tx Minutes: [address] PT, [address] [facility]

## 2023-01-06 NOTE — PLAN OF CARE
Problem: Discharge Planning  Goal: Discharge to home or other facility with appropriate resources  1/6/2023 0025 by Julio Cesar Vang RN  Outcome: [place] (Taken 1/5/2023 1945)  Discharge to home or other facility with appropriate resources: Identify barriers to discharge with patient and caregiver  1/5/2023 1900 by Jarek Lan RN  Outcome: Progressing     Problem: Pain  Goal: Verbalizes/displays adequate comfort level or baseline comfort level  1/6/2023 0025 by Julio Cesar Vang RN  Outcome: Progressing  1/5/2023 1900 by Jarek Lan RN  Outcome: Progressing     Problem: Skin/Tissue Integrity  Goal: Absence of new skin breakdown  Description: 1. Monitor for areas of redness and/or skin breakdown  2. Assess vascular access sites hourly  3. Every 4-6 hours minimum:  Change oxygen saturation probe site  4. Every 4-6 hours:  If on nasal continuous positive airway pressure, respiratory therapy assess nares and determine need for appliance change or resting period.   1/6/2023 0025 by Julio Cesar Vang RN  Outcome: Progressing  1/5/2023 1900 by Jarek aLn RN  Outcome: Progressing

## 2023-01-06 NOTE — PROGRESS NOTES
Comprehensive Nutrition Assessment    RECOMMENDATIONS:  PO Diet: Regular; Low Sodium;discontinue low fat/low chol modifier  ONS: n/a  Nutrition Education: No recommendation at this time       NUTRITION ASSESSMENT:   Nutritional summary & status: Consult for ONS. Pt is new to [facility]. Visited this am. Receiving a Regular; Low Fat; Low Chol;High Fiber/2 gm Na diet with meal intakes of %. Requested diet liberalization of low fat, low chol restriction due to limited selections and not finding foods she will eat. Aware of need to continue low sodium diet.  lbs. Noted wt of 205 lbs (4/22) indicating  loss of 16% in 10 months, not significant. Stated that she wished to lose wt, however, intake had decreased following Covid 19 and loss of taste. Appetite now returning. Did not wish to receive ONS at this time. Nutrition status compromised due recent unplanned weight loss and decrease in appetite. Will discontinue low fat/low chol diet modifier. Will monitor po intake for adequacy and possible need for ONS. Admission/PMH: Admit. Subdural hematoma  PMHx: COPD, HLD, HTN, OA, AF    MALNUTRITION ASSESSMENT  Context of Malnutrition: Chronic Illness   Malnutrition Status:  Moderate malnutrition  Findings of the 6 clinical characteristics of malnutrition (Minimum of 2 out of 6 clinical characteristics is required to make the diagnosis of moderate or severe Protein Calorie Malnutrition based on AND/ASPEN Guidelines):  Energy Intake:  75% or less estimated energy requirements for 1 month or longer  Weight Loss:  Mild weight loss (specify amount and time period) (16% wt loss in 10 months)     Body Fat Loss:  No significant body fat loss     Muscle Mass Loss:  No significant muscle mass loss    Fluid Accumulation:  Mild Extremities   Strength:  Not Performed    NUTRITION DIAGNOSIS   Moderate malnutrition related to altered taste perception, inadequate protein-energy intake as evidenced by poor intake prior to admission, weight loss    Nutrition Monitoring and Evaluation:   Food/Nutrient Intake Outcomes:  Food and Nutrient Intake  Physical Signs/Symptoms Outcomes:  Biochemical Data, Meal Time Behavior, Weight, Skin     OBJECTIVE DATA: Significant to nutrition assessment  Nutrition Related Findings: LBM1/05. Nutrition related labs wnl. RLE/LLE +1 pitting edema, generalized non pitting edema. Wounds:  (traumatic wound lt upper head)  Nutrition Goals: PO intake 75% or greater, by next RD assessment     CURRENT NUTRITION THERAPIES  ADULT DIET; Regular; Low Sodium (2 gm)  PO Intake: %   PO Supplement Intake:0% (ordered per protocol; pt did not want)  Additional Sources of Calories/IVF:n/a     ANTHROPOMETRICS  Current Height: 5' (152.4 cm)  Current Weight: 171 lb 1.2 oz (77.6 kg)    Admission weight: 171 lb 1.2 oz (77.6 kg)  Ideal Body Weight (IBW): 100 lbs  (45 kg)    Usual Bodyweight     BMI: 32.4    COMPARATIVE STANDARDS  Energy (kcal):  4808-7530 (15-18 kcal/CBW/77.6 kg)     Protein (g):  62-78 (.8-1.0 gm/CBW/77.6 kg)       Fluid (mL/day):  1 ml/kcal or per MD    The patient will be monitored per nutrition standards of care. Consult dietitian if additional nutrition interventions are needed prior to RD reassessment.      Leonila Walton, [facility]:  [phone]  Office:  [phone]

## 2023-01-06 NOTE — PROGRESS NOTES
Occupational Therapy  Facility/Department: [facility] ACUTE REHAB UNIT  Occupational Therapy Initial Assessment/Treatment    Name: Radha Kim  : 1935  MRN: [id number]  Date of Service: 2023    Discharge Recommendations:   Continue to assess pending progress, 24 hour supervision or assist, Home with Home health OT  OT Equipment Recommendations  Equipment Needed: Yes  Mobility Devices: ADL Assistive Devices  ADL Assistive Devices: Grab Bars - shower (has shower chair already)     OT Equipment Recommendations  Other: Continue to assess during stay- Pt will need grab bar installed in shower     Patient Diagnosis(es): There were no encounter diagnoses. Past Medical History:  has a past medical history of Asthma, Cancer ([facility] [address] History of blood transfusion, Hyperlipidemia, Hypertension, Leg swelling, Osteoarthritis, UTI symptoms, and Venous insufficiency of leg. Past Surgical History:  has a past surgical history that includes pr dilation & curettage dx&/ther nonobstetric; pr vaginal hysterectomy uterus 250 gm/< (); pr arthrp kne condyle&platu medial&lat compartments (Right, ); Appendectomy (); Carpal tunnel release; Hemorrhoid surgery; hernia repair; Nasal septum surgery; Tonsillectomy; Hysterectomy; joint replacement (Bilateral); Vein Surgery; and Capsule endoscopy (N/A, 2022). Treatment Diagnosis: impaired ADLs, iADLs, functional mobility and transfers    Assessment   Performance deficits / Impairments: Decreased functional mobility ; Decreased endurance;Decreased ADL status; Decreased balance;Decreased strength;Decreased safe awareness;Decreased high-level IADLs  Assessment: Pt is an 80 yo F admitted to ARU after hospitalization for mechanical fall that resulted in SDH. Pt is typically highly independent with all ADLs and fx mobility with 2WW but reports having increased falls at home recently (~5 in past month).  Pt is from home alone and recently started having assistance from [address] services for cleaning. Pt was an active  and motivated to return back to prior level of independence. Pt is currently needing mod A fo rLB dressing and bathing with increased time and effort. Pt is performing BUE bathing and toileting with min A. Pt currently requires CGA with transfers and fx mobility with 2WW with VC to keep 2WW near self at all times and not pull up with walker during transfers. Pt has decreased AROM in R shoulder 2/2 prior rotator cuff injury and has scattered brusing throughout body from fall. Pt would benefit from ongoing intensive inpatient therapy services to regain her independence, strength, functional mobility, and improve her balance for safety since pt is anticipating returning home alone. Will continue to follow on POC. Treatment Diagnosis: impaired ADLs, iADLs, functional mobility and transfers  Prognosis: Good  Decision Making: Medium Complexity  REQUIRES OT FOLLOW-UP: Yes  Activity Tolerance  Activity Tolerance: Patient Tolerated treatment well        Plan   Occupational Therapy Plan  Times Per Week: 90 mins per day / 5x per week  Times Per Day: Once a day  Days Per Week: 5 Days  Hours Per Day: 1.5 hours  Current Treatment Recommendations: Strengthening, Functional mobility training, Balance training, Endurance training, Neuromuscular re-education, Patient/Caregiver education & training, Safety education & training, Self-Care / ADL     Restrictions  Position Activity Restriction  Other position/activity restrictions: no activity restrictions noted    Subjective   General  Chart Reviewed: Yes  Patient assessed for rehabilitation services?: Yes  Additional Pertinent Hx: 82yo woman with HLD; HTN; BLE lymphedema; frequent falls; afib; OA. Presented to [facility] and was found to have a 9mm hemorrhagic contusion in her left frontal lobe with some scattered SAH in her left frontal and parietal lobes.  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 Pt admitted to ARU 1/5  Family / Caregiver Present: No  Referring Practitioner: Lillian Duran DO  Diagnosis: intracranial hemorrhage 2/2 fall  Subjective  Subjective: Pt seated in recliner upon OT arrival. Pt very pleasant and agreeable to OT evaluation. Pt stating, \"I knocked myself up pretty good. I have a big ole bruise on my L hip. I have noticed my legs are somewhat swollen but I need special compression socks with my lymphadema and my old ones don't fit anymore\".      Social/Functional History  Social/Functional History  Lives With: Alone (dog)  Type of Home: Mobile home  Home Layout: One level  Home Access: Ramped entrance, Stairs to enter with rails  Entrance Stairs - Number of Steps: uses ramp to enter and 4 steps with B HR to exit  Entrance Stairs - Rails: Both  Bathroom Shower/Tub: Walk-in shower, Shower chair with back  Bathroom Toilet: Handicap height (sink for leverage)  Bathroom Equipment: Hand-held shower, Shower chair, Grab bars in shower  Home Equipment: Walker, rolling, Walker, 4 wheeled, Leg , Sock aid, Reacher, Wheelchair-manual (HR on bed)  Has the patient had two or more falls in the past year or any fall with injury in the past year?: Yes (reports 5 falls in month leading up to last fall, looses balance walking to BR without device / with slippery socks on/ slip on rug)  ADL Assistance: Independent  Homemaking Assistance: Independent  Homemaking Responsibilities: Yes ([employer] aide recently started assisting for 3 hours once a week with dusting and vaccuuming, cooks)  Bill Paying/Finance Responsibility: Primary (checkwriting, daughter brought up info yesterday and pt completed online)  Health Care Management: Primary (organizer)  Ambulation Assistance: Independent (In home: RW, cane, HR in hallway; community: RW)  Transfer Assistance: Independent  Active : Yes  Mode of Transportation: Car  Education: 12th grade  Occupation: Retired  Type of Occupation: elementary   Leisure & Hobbies: play bingo, play cards at park, go out to eat  Additional Comments: reports baseline difficulty with RLE and uses leg  to get into car; pt reports son lives in same mobile home park and daughter lives closeby who works from home and could stay with her if necessary and has multiple friends who can check in; gives some conflicting information concerning which device she uses in home/ if she uses a device in home     Objective   Heart Rate: 55  Heart Rate Source: Monitor  BP: (!) 102/42  BP Location: Left upper arm  BP Method: Automatic  Patient Position: Semi fowlers  MAP (Calculated): 62  SpO2: 95 %  O2 Device: None (Room air)          Observation/Palpation  Posture: Good  Observation: bruising on L hip; knot on forehead with bruising from fall  Safety Devices  Type of Devices: Call light within reach;Nurse notified;Gait belt; Chair alarm in place; Left in chair  Restraints  Restraints Initially in Place: No  Balance  Sitting: Intact  Standing: With support (use of 2WW)  Gait  Overall Level of Assistance: Contact-guard assistance  Distance (ft):  (Pt ambulated from recliner chair to commode. From commode to sink. From sink to tub transfer bench. From TTB to recliner.)  Assistive Device: Walker  Wheelchair Management  Wheelchair Management: No  Toilet Transfers  Toilet - Technique: Ambulating  Equipment Used: Grab bars  Toilet Transfer: Contact guard assistance  Toilet Transfers Comments: Pt pulling self up with 2WW  Tub Transfers  Tub - Transfer From: Raymundo Cramer  Tub - Transfer Type: To and From  Tub - Transfer To:  Transfer tub bench  Tub Transfers: Contact guard  AROM: Generally decreased, functional (Pt reports needing rotator cuff surgery on RUE that was delayed by this hospitalization and fall; pt is able to move [facility] but reports pain with shoulder flexion and has difficulty putting on coats per report)  Strength: Generally decreased, functional  Coordination: Within functional limits  Tone: Normal  Sensation:  (WFL for BUE; peripheral neuropathy in BLEs)  ADL  Feeding: Beverage management;Setup  Grooming: Contact guard assistance;Setup;Verbal cueing  Grooming Skilled Clinical Factors: pt washed hands and performed 2 part oral hygiene in stance at sink (teeth brushing + mouthwash), pt required VCs for RW placement and setup of items  UE Bathing: Verbal cueing;Contact guard assistance  UE Bathing Skilled Clinical Factors: Pt sat on tub shower bench and washed BUE and chest with CGA; pt needs VC to wash soap with HH shower hose prior to moving towards next step  LE Bathing: Moderate assistance  LE Bathing Skilled Clinical Factors: Pt required mod A to wash BLE with inability to reach her feet while seated on tub transfer bench; pt was able to stand for brief interval to clean buttocks with soapy wash cloth and was able to clean tops of thighs + varun area from seated  UE Dressing: Minimal assistance; Increased time to complete  UE Dressing Skilled Clinical Factors: Pt requires min A to thred sleeved knitted ambrosio over RUE ; pt able to pull over head and don LUE but unable to pull down back needing assistance  LE Dressing: Moderate assistance  LE Dressing Skilled Clinical Factors: Pt doffed briefs from standing level at commode with min A. Pt seated on tub transfer bench was able to doff socks by performing trunk flexion and reaching forward with increased time and CGA. Pt then required max A to don new socks on feet and mod A for initial thredding of briefs and PJ pants to ankles. Pt then performed donning shoes in long leg seated position with BLE supported on couch with min A. Pt required increased time and effort. Pt required rest breaks throughout task. Toileting: Minimal assistance  Toileting Skilled Clinical Factors: Pt continent of urine but needing min A to discard briefs.  Pt performed own varun care from partial stance level with CGA. Pt donned new brief with mod A after bathing routine.      Activity Tolerance  Activity Tolerance: Patient tolerated evaluation without incident;Patient limited by endurance     Transfers  Sit to stand: Contact guard assistance  Stand to sit: Contact guard assistance  Transfer Comments: with RW- pt attempts to pull self up with RW  Vision  Vision: Impaired  Vision Exceptions: Wears glasses for reading (need to wear glasses at all times; was supposed to  the day of accident)  Hearing  Hearing: Within functional limits  Cognition  Overall Cognitive Status: WFL  Cognition Comment: decreased safety awareness at times; pt tends to not keep 2WW close by at all times and needs cues for hand placement; pt at times providing conficting information about prior DME use with 2WW vs just using grab bars/furniture walking in home  Orientation  Overall Orientation Status: Within Normal Limits  Orientation Level: Oriented X4                  Education Given To: Patient  Education Provided: Role of Therapy;Plan of Care  Education Method: Verbal  Barriers to Learning: None  Education Outcome: Verbalized understanding                    G-Code     OutComes Score                                                  AM-PAC Score             Tinneti Score     Goals  Short Term Goals  Time Frame for Short Term Goals: STG=LTG  Long Term Goals  Time Frame for Long Term Goals : 10 days  Long Term Goal 1: Pt will perform oral hygiene with mod I  Long Term Goal 2: Pt will perform toileting with mod I  Long Term Goal 3: Pt will perform bathing routine with supervision using AE as needed (i.e long handled sponge)  Long Term Goal 4: Pt will perform LB dressing routine with AE as needed and mod I  Long Term Goal 5: Pt will perform iADL task of laundry with AE as needed and supervision  Patient Goals   Patient goals : \"to learn how to get up from a fall and to be independent so I can go home\"     Therapy Time   Individual Concurrent Group Co-treatment   Time In 1100         Time Out 1200         Minutes 60         Timed Code Treatment Minutes: 45 Minutes (+ 15 min OT eval)       Quentin Murillo, OT

## 2023-01-06 NOTE — PROGRESS NOTES
Speech Language Pathology  Facility/Department: [facility] ACUTE REHAB UNIT  Initial Speech/Language/Cognitive Assessment    NAME: Rianna Bennett  : 1935   MRN: [id number]  ADMISSION DATE: 2023  ADMITTING DIAGNOSIS: has Vaginal atrophy; History of endometrial cancer; Frequent UTI; H/O total hysterectomy with bilateral salpingo-oophorectomy (BSO); Acute intra-cranial hemorrhage ([address] Intracranial hemorrhage ([address] KYARA (acute kidney injury) ([address] Normocytic anemia; Hypertension; Hyperlipidemia; COPD (chronic obstructive pulmonary disease) ([address] SDH (subdural hematoma); and Malnutrition ([address] on their problem list.  DATE ONSET: 2022    Date of Eval: 2023   Evaluating Therapist: CARLOS MANUEL Vincent    RECENT RESULTS  CT OF HEAD: 2022  Impression       1. Stable left frontal intraparenchymal hematoma measuring 9 mm. 2.  Stable left subdural hematoma measuring up to 10 mm in thickness. 3.  Stable scattered foci of subarachnoid hemorrhage. 4.  Stable small left frontal scalp hematoma. Primary Complaint:     Pain: None indicated    Vision/ Hearing  Vision  Vision: Within Functional Limits  Vision Exceptions:  (need to wear glasses at all times; was supposed to  the day of accident)  Hearing  Hearing: Within functional limits    Assessment:  Cognitive Diagnosis: WFL   Diagnosis: No cognitive-linguistic impairments were noted. Pt did score below in the attention domain (moderate severity) secondary to errors of commission equalling correct responses resulting in a score of 0 on symbol cancellation. There were no attention deficits across tasks noted. Pt demo'd hand confusion only on clock construction resulting in still Optireno performance per scoring guidelines on standardized assessment. pt was stimulable to identify errors on errored responses.  The patient performed Optireno for check writing and a strong predictability for independent functioning with understanding medicine labels. Recommendations:  Recommendations  Requires SLP Intervention: No  Patient Education: Role of SLP, rationale for cognitive assessment, results, no treatment recommended. Patient Education Response: Verbalizes understanding  Timed Code Treatment Minutes: 0 Minutes  Total Treatment Time: 60       Plan: No treatment warranted. Safety Devices  Safety Devices in place: Yes  Type of devices: Call light within reach; Chair alarm in place (Reviewed call light policy)    Goals:  N/A    Subjective:  Pt had fall with loss of consciousness in parking lot with bystander responders calling EMS. Pt brought in to [facility] on 12/30 and pt had abnormal CT findings prompting transfer to [facility] for neurosurgical management. CT head showed a 9mm hemrorhagic contusion in her L frontal lobe, and multifocal SAH in the L frontal L parietal lobes. Pt was evaluted by SLP for swallowing only with no dysphagia identified. Pt transfers to ARU in order to rehab to return home.   Social/Functional History  Lives With: Alone  Homemaking Responsibilities: Yes  Bill Paying/Finance Responsibility: Primary (checkwriting, daughter brought up info yesterday and pt completed online)  Health Care Management: Primary (organizer)  Active : Yes  Mode of Transportation: Car  Education: 12th grade  Occupation: Retired  Type of Occupation: elementary   Leisure & Hobbies: play bingo, play cards at park, go out to eat  Vision  Vision: Within [employer]  Vision Exceptions:  (need to wear glasses at all times; was supposed to  the day of accident)  Hearing  Hearing: Within functional limits     Objective:  Motor Speech  Intelligibility: No impairment    Auditory Comprehension  Comprehension: Within Functional Limits     Expression  Primary Mode of Expression: Verbal  Verbal Expression  Verbal Expression: Within functional limits  Written Expression  Dominant Hand: Right  Written Expression:  (only assessed for single digits and writing (not print) within highly contextual task (check writing))     Pragmatics/Social Functioning  Pragmatics: Within functional limits    Cognition:   Orientation  Overall Orientation Status: Within Functional Limits (Grossly to situation - \"my head\", able to recall 3 different areas of brain)  Attention  Attention: Within Functional Limits (Pt scored below on symbol cancellation task only due to errors of commission x12)  Memory  Memory: Within Functional Limits  Problem Solving  Problem Solving: Within Functional Limits  Safety/Judgment  Safety/Judgment: Within Functional Limits    Pt was administered the Cognitive Linguistic Quick Test (CLQT) with the following results:    Attention- moderate (due to errors of commission = correct responses; pt stimulable following task for identifying error and differences)  Memory- [facility]  Executive Functions- [facility]  Language- WFL  Visuospatial Skills- [facility]  Composite Severity Rating- [facility]  Clock Drawing Severity Rating- [facility]    Patient was administered portions of the DEBORAH (Assessment of Language-Related Functional Activities) with the following results:  Writing a Check and Balancing a Checkbook: Completing a check writing task only - 100% accuracy. Understanding Medication Labels:  80% accuracy; This percentage correlates with an Independent Functioning Rating of 1 which indicates a high probability of independent functioning on this task. Error x1 would impact med administration but appeared to be related to novelty of label and once pt was instructed to read label in its entirety, pt was able to complete [facility]. Education:  Patient Education: Role of SLP, rationale for cognitive assessment, results, no treatment recommended. Patient Education Response: Verbalizes understanding  Safety Devices in place: Yes  Type of devices: Call light within reach; Chair alarm in place (Reviewed call light policy)    Therapy Time:   Individual Concurrent Group Co-treatment   Time In [address] [place] [address]   Time Out 1356 0000 0000 0000   Minutes 60 0 0 0   Variance: 0  Timed Code Treatment Minutes: 0 Minutes  Total Treatment Time: 61    Sanjay Choi M.A., [place] [id number]  Speech-Language Pathologist

## 2023-01-06 NOTE — H&P
Department of Physical Medicine & Rehabilitation  History & Physical      Patient Identification:  Lida Espino  : 1935  Admit date: 2023   Attending provider: Christen Troncoso DO        Primary care provider: Nika Dillon     Chief Complaint: ICH    History of Present Illness/Hospital Course:  Yareli Celis is a 69-year-old female with PMHx of Afib (s/p watchman device), COPD, HTN and multiple AVM who came in with multiple falls in the past week. She was putting groceries in the car and the next thing she remembers is sitting in the chair. She was told that she fell and hit her head on the pole of a handicap sign. 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 Neurosurgery was consulted and was transferred to [facility] ICU.       Prior Level of Function:  Independent for mobility, ADLs, and IADLs    Current Level of Function:  Mod assist    Pertinent Social History:  Support: lives alone      Past Medical History:   Diagnosis Date    Asthma     Cancer ([address]     skin cancer basal and squamous    History of blood transfusion     Hyperlipidemia     Hypertension     Leg swelling     Osteoarthritis     UTI symptoms     Venous insufficiency of leg      Fall in past year: yes    Past Surgical History:   Procedure Laterality Date    APPENDECTOMY      CAPSULE ENDOSCOPY N/A 2022    CAPSULE ENDOSCOPY performed by Lan Prieto MD at [address] (CERVIX STATUS UNKNOWN)      JOINT REPLACEMENT Bilateral     bilateral TKR    NASAL SEPTUM SURGERY      CO ARTHRP KNE CONDYLE&PLATU MEDIAL&LAT COMPARTMENTS Right     knee surgery    CO DILATION & CURETTAGE DX&/THER NONOBSTETRIC      D & C    CO VAGINAL HYSTERECTOMY UTERUS 250 GM/<  2004    Hysterectomy    TONSILLECTOMY      VEIN SURGERY      legs     Major Surgery in past 100 days: No    Family History Problem Relation Age of Onset    Hypertension Mother     Obesity Mother     Alzheimer's Disease Father        Social History     Socioeconomic History    Marital status:    Tobacco Use    Smoking status: Former    Smokeless tobacco: Never   Substance and Sexual Activity    Alcohol use:  Yes     Alcohol/week: 0.0 standard drinks     Comment: occ    Drug use: No       Allergies   Allergen Reactions    Other      Pollen         Current Facility-Administered Medications   Medication Dose Route Frequency Provider Last Rate Last Admin    sodium chloride flush 0.9 % injection 5-40 mL  5-40 mL IntraVENous BID Floyd L Heis, DO   10 mL at 01/06/23 0829    allopurinol (ZYLOPRIM) tablet 100 mg  100 mg Oral Daily Floyd L Heis, DO   100 mg at 01/06/23 4738    amiodarone (CORDARONE) tablet 100 mg  100 mg Oral Daily Floyd L Heis, DO   100 mg at 01/06/23 0814    atorvastatin (LIPITOR) tablet 10 mg  10 mg Oral Daily Floyd L Heis, DO   10 mg at 01/06/23 0814    budesonide (PULMICORT) nebulizer suspension 500 mcg  0.5 mg Nebulization BID Floyd L Heis, DO        And    arformoterol tartrate (BROVANA) nebulizer solution 15 mcg  15 mcg Nebulization BID Floyd L Heis, DO        ipratropium-albuterol (DUONEB) nebulizer solution 1 ampule  1 ampule Inhalation Q4H PRN Floyd L Heis, DO        levETIRAcetam (KEPPRA) tablet 500 mg  500 mg Oral BID Floyd L Heis, DO   500 mg at 01/06/23 0814    levothyroxine (SYNTHROID) tablet 75 mcg  75 mcg Oral Daily Floyd L Heis, DO   75 mcg at 01/06/23 0600    loperamide (IMODIUM) capsule 2 mg  2 mg Oral 4x Daily PRN Floyd L Heis, DO        montelukast (SINGULAIR) tablet 10 mg  10 mg Oral Daily Floyd L Heis, DO   10 mg at 01/06/23 0815    acetaminophen (TYLENOL) tablet 650 mg  650 mg Oral Q4H PRN Floyd L Heis, DO   650 mg at 01/06/23 0600    bisacodyl (DULCOLAX) EC tablet 5 mg  5 mg Oral Daily Floyd L Heis, DO        magnesium hydroxide (MILK OF MAGNESIA) 400 MG/5ML suspension 30 mL  30 mL Oral Daily PRN Floyd Duran, DO        polyethylene glycol (GLYCOLAX) packet 17 g  17 g Oral Daily PRN Floyd Duran, DO             REVIEW OF SYSTEMS:   Const: Alert. WDWN. No distress  Eyes: Conjunctiva noninjected, no icterus noted; pupils equal, round, and reactive to light. HENT: Atraumatic, normocephalic; Oral mucosa moist  Neck: Trachea midline, neck supple. No thyromegaly noted. CV: Regular rate and rhythm, no murmur rub or gallop noted  Resp: Lungs clear to auscultation bilaterally, no rales wheezes or ronchi, no retractions. Respirations unlabored. GI: Soft, nontender, nondistended. Normal bowel sounds. No palpable masses. Skin: Normal temperature and turgor. No rashes or breakdown noted. Ext: Bilateral pitting edema 1+. No varicosities. MSK: No joint tenderness, erythema, warmth noted. AROM intact. Neuro: Alert, oriented, appropriate. No cranial nerve deficits appreciated. Sensation intact to light touch. Motor examination reveals normal strength in all four limbs diffusely. No abnormalities with finger/nose or heel/shin noted. Reflexes normal and symmetric. Psych: Stable mood, normal judgement, normal affect     All pertinent positives are noted in the HPI. Physical Examination:  Vitals: Patient Vitals for the past 24 hrs:   BP Temp Temp src Pulse Resp SpO2 Height Weight   01/06/23 0800 (!) 102/42 97.9 °F (36.6 °C) Oral 55 -- 92 % -- --   01/05/23 1945 119/63 97.6 °F (36.4 °C) Oral 51 16 98 % -- --   01/05/23 1830 -- -- -- -- -- -- 5' 0.98\" (1.549 m) 171 lb 1.2 oz (77.6 kg)   01/05/23 1820 (!) 138/56 97.9 °F (36.6 °C) Oral (!) 49 16 98 % -- --       Const: Alert. WDWN. No distress  Eyes: Conjunctiva noninjected, no icterus noted; pupils equal, round, and reactive to light. HENT: Atraumatic, normocephalic; Oral mucosa moist  Neck: Trachea midline, neck supple. No thyromegaly noted.   CV: Regular rate and rhythm, no murmur rub or gallop noted  Resp: Lungs clear to auscultation bilaterally, no rales wheezes or ronchi, no retractions. Respirations unlabored. GI: Soft, nontender, nondistended. Normal bowel sounds. No palpable masses. Skin: Normal temperature and turgor. No rashes or breakdown noted. Ext: No significant edema appreciated. No varicosities. MSK: No joint tenderness, erythema, warmth noted. AROM intact. Neuro:   -Mental status: Alert. Oriented to person, place, time, situation. 3 word immediate and delayed recall (sock, bed, blue) intact. Attention intact (months of year in reverse). -Language: Speech fluent, repetition and naming intact  -Cranial nerves: VFF, PERRL, EOMI, Facial sensation intact, Face symmetric, Hearing intact, Palate elevation symmetric, Shoulder shrug intact. Tongue midline.   -Sensation intact to light touch. -Motor examination reveals normal strength in all four limbs diffusely.   -No abnormalities with finger/nose noted. -Reflexes 2+ and symmetric. Negative Lennox  Psych: Stable mood, normal judgement, normal affect     Lab Results   Component Value Date    WBC 3.2 (L) 01/06/2023    HGB 8.0 (L) 01/06/2023    HCT 24.8 (L) 01/06/2023    MCV 92.0 01/06/2023     01/06/2023     Lab Results   Component Value Date    INR 1.06 12/30/2022    PROTIME 13.7 12/30/2022     Lab Results   Component Value Date    CREATININE 1.1 01/06/2023    BUN 15 01/06/2023     01/06/2023    K 4.8 01/06/2023     01/06/2023    CO2 28 01/06/2023     Lab Results   Component Value Date    ALT 11 12/30/2022    AST 21 12/30/2022    ALKPHOS 149 (H) 12/30/2022    BILITOT 0.5 12/30/2022         No orders to display       The above laboratory data have been reviewed. The above imaging data have been reviewed. The above medical testing have been reviewed. Body mass index is 32.34 kg/m².     POST ADMISSION PHYSICIAN EVALUATION  The patient has agreed to being admitted to our comprehensive inpatient rehabilitation facility and can tolerate the intensity of service consisting of at least:  --180 minutes of therapy a day, 5 out of 7 days a week. OR  --15 hours of intensive therapy within a 7 consecutive day period. The patient/family has a good understanding of our discharge process and will benefit from an interdisciplinary inpatient rehabilitation program. The patient has potential to make improvement and is in need of at least two of the following multidisciplinary therapies including but not limited to physical, occupational, respiratory, and speech, nutritional services, wound care, and prosthetics and orthotics. Given the patients complex condition and risk of further medical complications, rehabilitation services cannot be safely provided at a lower level of care such as a skilled nursing facility. All of the goals listed below were reviewed with the patient and he/she is in agreement. I have compared the patients medical and functional status at the time of the preadmission screening and the same on this date, and there are no significant changes. By signing this document, I acknowledge that I have personally performed a full physical examination on this patient within 24 hours of admission to this inpatient rehabilitation facility and have determined the patient to be able to tolerate the above course of treatment at an intensive level for a reasonable period of time. I will be completing a detailed individualized  Plan of Care for this patient by day four of the patients stay based upon the Preadmission Screen, this Post-Admission Evaluation, and the therapy evaluations. Barriers: Decreased functional mobility, medical comorbidities  Services Required: PT, OT, SLP  Goals: mod I  Prognosis: Good  Anticipated Dispo: home  ELOS: TBD    Rehabilitation Diagnosis:   2.22, Traumatic, closed injury      Assessment and Plan:  1.  Intracranial hemorrhage   - 9 mm left subdural hematoma with scattered foci of SAH 2/2 mechanical fall  - Keppra 500 mg BID for seizure ppx  - Neurochecks Q4H  - PT/OT  - SBP goal <160   - Neurology & NSGY following   2. Hypertension  - SBP goal <160  - PRN in place   3. Normocytic anemia 2/2 fall  - Hgb 7.6 with 9.2 on admission; continue to monitor   4. Hypothyroidism   - Continue home Synthroid     Impairments: Decreased functional mobility, Decreased ADLs    Bladder - high risk retention - Monitor PVRs, SC prn >300cc    Bowel - high risk constipation - colace BID, PRN miralax and MoM. follow bowel movements. Enema or suppository if needed.      Safety - fall precautions    PPx  DVT: SCD  GI: pantoprazole    FULL CODE    Michelle Krause D.O. M.P.H  PM&R  1/6/2023  9:32 AM

## 2023-01-07 PROCEDURE — 1280000000 HC REHAB R&B

## 2023-01-07 PROCEDURE — 97110 THERAPEUTIC EXERCISES: CPT

## 2023-01-07 PROCEDURE — 99232 SBSQ HOSP IP/OBS MODERATE 35: CPT | Performed by: PHYSICAL MEDICINE & REHABILITATION

## 2023-01-07 PROCEDURE — 6370000000 HC RX 637 (ALT 250 FOR IP): Performed by: PHYSICAL MEDICINE & REHABILITATION

## 2023-01-07 PROCEDURE — 97530 THERAPEUTIC ACTIVITIES: CPT

## 2023-01-07 PROCEDURE — 97116 GAIT TRAINING THERAPY: CPT

## 2023-01-07 PROCEDURE — 2580000003 HC RX 258: Performed by: PHYSICAL MEDICINE & REHABILITATION

## 2023-01-07 PROCEDURE — 97535 SELF CARE MNGMENT TRAINING: CPT

## 2023-01-07 RX ADMIN — ALLOPURINOL 100 MG: 100 TABLET ORAL at 08:51

## 2023-01-07 RX ADMIN — LEVOTHYROXINE SODIUM 75 MCG: 0.07 TABLET ORAL at 05:46

## 2023-01-07 RX ADMIN — LEVETIRACETAM 500 MG: 500 TABLET, FILM COATED ORAL at 08:52

## 2023-01-07 RX ADMIN — ATORVASTATIN CALCIUM 10 MG: 10 TABLET, FILM COATED ORAL at 08:51

## 2023-01-07 RX ADMIN — SODIUM CHLORIDE, PRESERVATIVE FREE 10 ML: 5 INJECTION INTRAVENOUS at 20:20

## 2023-01-07 RX ADMIN — SODIUM CHLORIDE, PRESERVATIVE FREE 10 ML: 5 INJECTION INTRAVENOUS at 08:51

## 2023-01-07 RX ADMIN — AMIODARONE HYDROCHLORIDE 100 MG: 200 TABLET ORAL at 08:51

## 2023-01-07 RX ADMIN — LEVETIRACETAM 500 MG: 500 TABLET, FILM COATED ORAL at 20:20

## 2023-01-07 RX ADMIN — MONTELUKAST 10 MG: 10 TABLET, FILM COATED ORAL at 08:51

## 2023-01-07 RX ADMIN — ACETAMINOPHEN 650 MG: 325 TABLET ORAL at 16:46

## 2023-01-07 RX ADMIN — ACETAMINOPHEN 650 MG: 325 TABLET ORAL at 03:45

## 2023-01-07 ASSESSMENT — PAIN DESCRIPTION - DESCRIPTORS
DESCRIPTORS: ACHING
DESCRIPTORS: ACHING
DESCRIPTORS: ACHING;POUNDING
DESCRIPTORS: ACHING

## 2023-01-07 ASSESSMENT — PAIN DESCRIPTION - ONSET: ONSET: ON-GOING

## 2023-01-07 ASSESSMENT — PAIN SCALES - GENERAL
PAINLEVEL_OUTOF10: 0
PAINLEVEL_OUTOF10: 0
PAINLEVEL_OUTOF10: 5
PAINLEVEL_OUTOF10: 3
PAINLEVEL_OUTOF10: 2
PAINLEVEL_OUTOF10: 7

## 2023-01-07 ASSESSMENT — PAIN SCALES - WONG BAKER: WONGBAKER_NUMERICALRESPONSE: 0

## 2023-01-07 ASSESSMENT — PAIN DESCRIPTION - ORIENTATION
ORIENTATION: ANTERIOR;LEFT
ORIENTATION: LEFT;ANTERIOR
ORIENTATION: MID
ORIENTATION: MID

## 2023-01-07 ASSESSMENT — PAIN - FUNCTIONAL ASSESSMENT
PAIN_FUNCTIONAL_ASSESSMENT: ACTIVITIES ARE NOT PREVENTED

## 2023-01-07 ASSESSMENT — PAIN DESCRIPTION - PAIN TYPE: TYPE: ACUTE PAIN

## 2023-01-07 ASSESSMENT — PAIN DESCRIPTION - LOCATION
LOCATION: HEAD

## 2023-01-07 ASSESSMENT — PAIN DESCRIPTION - FREQUENCY: FREQUENCY: INTERMITTENT

## 2023-01-07 NOTE — PLAN OF CARE
Problem: Pain  Goal: Verbalizes/displays adequate comfort level or baseline comfort level  Recent Flowsheet Documentation  Taken 1/7/2023 0845 by Diaz Hare RN  Verbalizes/displays adequate comfort level or baseline comfort level: Encourage patient to monitor pain and request assistance

## 2023-01-07 NOTE — PROGRESS NOTES
Department of Physical Medicine & Rehabilitation  Progress Note    Patient Identification:  Lida Espino  [id number]  : 1935  Admit date: 2023    Chief Complaint: SDH (subdural hematoma)    Subjective:   No acute events overnight. Patient seen this am. She slept well and has no new complaints this morning. Able to participate in therapy today. She is looking forward to going home. Seen in her room this morning. Bruises healing slowly. ROS: No f/c, n/v, cp     Objective:  Patient Vitals for the past 24 hrs:   BP Temp Temp src Pulse Resp SpO2 Height   23 0545 121/62 -- -- 62 16 -- --   23 (!) 116/42 97.5 °F (36.4 °C) Oral 56 16 99 % --   23 1250 -- -- -- -- -- -- 5' (1.524 m)   23 1105 -- -- -- -- -- 95 % --   23 0800 (!) 102/42 97.9 °F (36.6 °C) Oral 55 -- 92 % --     Const: Alert. No distress, pleasant. HEENT: Normocephalic, atraumatic. Normal sclera/conjunctiva. MMM. - healing ecchymosis   CV: Regular rate and rhythm. Resp: No respiratory distress. Lungs CTAB. Abd: Soft, nontender, nondistended, NABS+   Ext: No edema. Neuro: Alert, oriented, appropriately interactive. Psych: Cooperative, appropriate mood and affect    Laboratory data: Available via EMR. Last 24 hour lab  No results found for this or any previous visit (from the past 24 hour(s)).     Therapy progress:  PT  Position Activity Restriction  Other position/activity restrictions: no activity restrictions noted  Objective     Sit to Stand: Contact guard assistance (at recliner/ EOB/ wc/ commode with RW, VC for hand placement)  Stand to Sit: Contact guard assistance (at recliner/ EOB/ wc/ commode with RW, VC for hand placement)  Device: Rolling Walker  Assistance: Contact guard assistance  Distance: 15', 200' (including ascent/descent of 15' ramp), 75', 10'  OT  PT Equipment Recommendations  Equipment Needed: No  Other: pt owns cane/RW/rollator  Toilet - Technique: Ambulating  Equipment Used: Grab bars  Toilet Transfers Comments: Pt pulling self up with 2WW  Assessment        SLP          Body mass index is 33.41 kg/m². Assessment and Plan:  1. Intracranial hemorrhage   - 9 mm left subdural hematoma with scattered foci of SAH 2/2 mechanical fall  - Keppra 500 mg BID for seizure ppx  - PT/OT  - SBP goal <160   - Neurology & NSGY following     2. Hypertension  - SBP goal <160  - PRN in place     3. Normocytic anemia 2/2 fall  - Hgb 7.6 with 9.2 on admission; continue to monitor     4.  Hypothyroidism   - Continue home Synthroid     Rehab Progress: Improving  Anticipated Dispo: home  Services/DME: KAREN  ELOS: KAREN Carranza D.O. M.P.H  PM&R  1/7/2023  7:35 AM

## 2023-01-07 NOTE — PROGRESS NOTES
Patient alert and oriented X4, assessment done as charted c/o slight headache, but declined pain medication. Worked well with therapy today had good appetite.

## 2023-01-07 NOTE — PROGRESS NOTES
Physical Therapy  Facility/Department: [facility] ACUTE REHAB UNIT  Rehabilitation Physical Therapy Treatment Note    NAME: Sean Skinner  : 1935 (81 y.o.)  MRN: [id number]  CODE STATUS: Full Code    Date of Service: 23       Restrictions:  Position Activity Restriction  Other position/activity restrictions: no activity restrictions noted     SUBJECTIVE  Subjective  Subjective: \"I have a bad headache. \" The pt presents sitting up in chair and willing to work with therapist.  Pain: Pt did not rate headache pain, stated RN aware        Post Treatment Pain Screening         OBJECTIVE  Cognition  Overall Cognitive Status: WFL  Orientation  Overall Orientation Status: Within Normal Limits  Orientation Level: Oriented X4    Functional Mobility  Transfers  Surface: To chair with arms;From chair with arms  Additional Factors: Verbal cues (cueing for hand placement)  Sit to Stand  Assistance Level: Stand by assist (from recliner and chair in gym)  Skilled Clinical Factors: cueing for hand placement occasionally  Stand to Sit  Assistance Level: Stand by assist      Environmental Mobility  Ambulation  Surface: Level surface  Device: Rolling walker  Distance: 300' (including up/down 70' ramp) + 70'  Activity Comments: decreased gait speed, decreeased step height, decreased step length irma  Assistance Level: Contact guard assist             PT Exercises  Exercise Treatment: sit to stands x 10, marches with 1 HHA x 20 irma, calf raises/toe raises x 20 irma with 2 HHA, and hip abd x 20 irma with 2 HHA    Second Session: pt seated in chair on entry, agreeable to therapy. Denies pain but reports previous headache but states it is better now. Pt requesting to use bathroom and work on floor recovery. Pt amb to toilet with RW and CGA/SBA with cues for bringing walker back to toilet fully before initiating sit. Pt manages pants and pericare after continent of bladder.  Pt amb 150ft in hallway with CGA progressing to SBA, no LOB noted. In gym, reviewed floor recovery options. On mat table pt perform rolling supine>prone, prone to quadruped via R hip hike and min A lifting at hips. Pt unable to sustain position for long due to BUE weakness and req'd rest break. PT assisted pt to half kneeling on mat on floor, then assisted to supine position. Pt able to scoot on bottom in long sitting toward mat table, cued to bring forearms onto mat table and perform half kneeling at table - req'd min A at hips to attain position. Then req'd min A to lift self onto mat. Pt unable to lift self due to BUE weakness, especially triceps. Pt unsafe to complete floor recovery alone at this point, rec life alert and assist of 1 to complete transfer. Pt states she does have a life alert. PT provided green tband and edu on perf tricep ext and chair push ups to begin strengthening BUEs. Pt completed 2x10 each. Pt left in room with all needs in reach, alarm on. ASSESSMENT/PROGRESS TOWARDS GOALS       Assessment  Assessment: Pt able to tolerate long gait distance including ramp though demonstrates some LE weakness and decreased endurance with mobility, requiring rest breaks. Pt with impaired balance as well during exercises. She would benefit from further IP PT to decrease her risk of falling which she has a history of and improve her safety with mobility.   Activity Tolerance: Patient tolerated evaluation without incident;Patient limited by endurance  PT Equipment Recommendations  Equipment Needed: No  Other: pt owns cane/RW/rollator    Goals  Patient Goals   Patient Goals : to go home  Short Term Goals  Time Frame for Short Term Goals: 10 days  Short Term Goal 1: pt will perform bed mobility independently  Short Term Goal 2: pt will perform sit<>stand with LRAD and mod I  Short Term Goal 3: pt will ambulate 150' with LRAD and mod I  Short Term Goal 4: Pt will complete ascent/descent of 10' ramp with LRAD and mod I  Short Term Goal 5: Pt will compelte ascent/descent of 4 steps with B HR and mod I    PLAN OF CARE/SAFETY  Physcial Therapy Plan  General Plan: 5-7 times per week  Days Per Week: 6 Days  Hours Per Day: 1 hour  Therapy Duration: 10 Days  Current Treatment Recommendations: Strengthening;Balance training;Gait training;Functional mobility training;Transfer training; Endurance training;Patient/Caregiver education & training; Therapeutic activities; Safety education & training  Safety Devices  Type of Devices: Call light within reach;Nurse notified;Gait belt; Chair alarm in place; Left in chair  Restraints  Restraints Initially in Place: No    EDUCATION  Education  Education Given To: Patient  Education Provided: Role of Therapy; Safety;Transfer Training;Mobility Training  Education Method: Verbal  Barriers to Learning: Cognition (decreased short term memory)  Education Outcome: Verbalized understanding;Continued education needed        Therapy Time   Individual Concurrent Group Co-treatment   Time In 0735         Time Out 0805         Minutes 30         Second Session Therapy Time:   Individual Concurrent Group Co-treatment   Time In 1000         Time Out 1100         Minutes 60           Timed Code Treatment Minutes:  24+92    Total Treatment Minutes:  80        [place], [place], 01/07/23 at 8:33 AM     Second session only: Manuel Thomas PT, DPT, NCS, CSRS

## 2023-01-07 NOTE — PLAN OF CARE
Problem: Discharge Planning  Goal: Discharge to home or other facility with appropriate resources  Outcome: Progressing  Flowsheets (Taken 1/6/2023 0819)  Discharge to home or other facility with appropriate resources: Identify barriers to discharge with patient and caregiver     Problem: Pain  Goal: Verbalizes/displays adequate comfort level or baseline comfort level  Outcome: Progressing  Flowsheets (Taken 1/6/2023 2012)  Verbalizes/displays adequate comfort level or baseline comfort level:   Encourage patient to monitor pain and request assistance   Assess pain using appropriate pain scale     Problem: Skin/Tissue Integrity  Goal: Absence of new skin breakdown  Description: 1. Monitor for areas of redness and/or skin breakdown  2. Assess vascular access sites hourly  3. Every 4-6 hours minimum:  Change oxygen saturation probe site  4. Every 4-6 hours:  If on nasal continuous positive airway pressure, respiratory therapy assess nares and determine need for appliance change or resting period.   Outcome: Progressing     Problem: Safety - Adult  Goal: Free from fall injury  Outcome: Progressing  Flowsheets (Taken 1/6/2023 2012)  Free From Fall Injury: Instruct family/caregiver on patient safety     Problem: ABCDS Injury Assessment  Goal: Absence of physical injury  Outcome: Progressing     Problem: Nutrition Deficit:  Goal: Optimize nutritional status  Recent Flowsheet Documentation  Taken 1/6/2023 1250 by Oneida House RD  Nutrient intake appropriate for improving, restoring, or maintaining nutritional needs:   Assess nutritional status and recommend course of action   Monitor oral intake, labs, and treatment plans   Recommend appropriate diets, oral nutritional supplements, and vitamin/mineral supplements

## 2023-01-07 NOTE — PLAN OF CARE
Problem: Discharge Planning  Goal: Discharge to home or other facility with appropriate resources  1/7/2023 0040 by Aman Wiggins  Outcome: Progressing  1/7/2023 0024 by Aman Wiggins  Outcome: Progressing  Flowsheets (Taken 1/6/2023 2100)  Discharge to home or other facility with appropriate resources: Identify barriers to discharge with patient and caregiver  1/6/2023 2012 by Nitish Gee RN  Outcome: Progressing  Flowsheets (Taken 1/6/2023 8030)  Discharge to home or other facility with appropriate resources: Identify barriers to discharge with patient and caregiver     Problem: Pain  Goal: Verbalizes/displays adequate comfort level or baseline comfort level  1/7/2023 0040 by JAZLYN Palafox  Outcome: Progressing  1/7/2023 0024 by Aman Wiggins  Outcome: Progressing  Flowsheets (Taken 1/6/2023 2015)  Verbalizes/displays adequate comfort level or baseline comfort level: Encourage patient to monitor pain and request assistance  1/6/2023 2012 by Nitish Gee RN  Outcome: Progressing  Flowsheets (Taken 1/6/2023 2012)  Verbalizes/displays adequate comfort level or baseline comfort level:   Encourage patient to monitor pain and request assistance   Assess pain using appropriate pain scale     Problem: Skin/Tissue Integrity  Goal: Absence of new skin breakdown  Description: 1. Monitor for areas of redness and/or skin breakdown  2. Assess vascular access sites hourly  3. Every 4-6 hours minimum:  Change oxygen saturation probe site  4. Every 4-6 hours:  If on nasal continuous positive airway pressure, respiratory therapy assess nares and determine need for appliance change or resting period.   1/7/2023 0040 by Aman Wiggins  Outcome: Progressing  1/7/2023 0024 by Aman Wiggins  Outcome: Progressing  1/6/2023 2012 by Nitish Gee RN  Outcome: Progressing     Problem: Safety - Adult  Goal: Free from fall injury  1/6/2023 2012 by Nitish Gee RN  Outcome: Progressing  Flowsheets (Taken 1/6/2023 2012)  Free From Fall Injury: Instruct family/caregiver on patient safety     Problem: ABCDS Injury Assessment  Goal: Absence of physical injury  1/6/2023 2012 by Clovis Lott RN  Outcome: Progressing

## 2023-01-07 NOTE — PROGRESS NOTES
Occupational Therapy  Facility/Department: [address] [place] [address]  Occupational Therapy Treatment    Name: Harvest Moritz  : 1935  MRN: [id number]  Date of Service: 2023    Discharge Recommendations:      OT Equipment Recommendations  Other: Continue to assess during stay- Pt will need grab bar installed in shower       Patient Diagnosis(es): There were no encounter diagnoses. Past Medical History:  has a past medical history of Asthma, Cancer ([address] History of blood transfusion, Hyperlipidemia, Hypertension, Leg swelling, Osteoarthritis, UTI symptoms, and Venous insufficiency of leg. Past Surgical History:  has a past surgical history that includes pr dilation & curettage dx&/ther nonobstetric; pr vaginal hysterectomy uterus 250 gm/< (); pr arthrp kne condyle&platu medial&lat compartments (Right, ); Appendectomy (); Carpal tunnel release; Hemorrhoid surgery; hernia repair; Nasal septum surgery; Tonsillectomy; Hysterectomy; joint replacement (Bilateral); Vein Surgery; and Capsule endoscopy (N/A, 2022). Treatment Diagnosis: impaired ADLs, iADLs, functional mobility and transfers      Assessment   Performance deficits / Impairments: Decreased functional mobility ; Decreased endurance;Decreased ADL status; Decreased balance;Decreased strength;Decreased safe awareness;Decreased high-level IADLs  Assessment: Pt admit to ARU for fall with SDH. Reporting feeling LH this date. Completing mobility  with RW and CGA in room, bathroom, toileting, stance at sink for grooming, and with mobility in hallway. Pt unsteady at times however no LOB. Pt with standing rest breaks throughout session 2/2 SOB and fatigue. Pt completing mobility ~250' in hallways. Pt standing at sink for oral care, comb hair. Seated in recliner chair for UE/LE exercise. Setup with lunch end of session. Pt cont to require increased cues for RW management, hand placement with transfers.  Pt is from home and normally IND with mobility, transfers, and ADL. Would benefit from ongoing intensive inpt therapy to rtn to baseline status. Will cont POC. Treatment Diagnosis: impaired ADLs, iADLs, functional mobility and transfers  Activity Tolerance  Activity Tolerance: Patient Tolerated treatment well;Patient limited by fatigue        Plan   Occupational Therapy Plan  Times Per Week: 90 mins per day / 5x per week  Times Per Day: Once a day  Days Per Week: 5 Days  Hours Per Day: 1.5 hours  Current Treatment Recommendations: Strengthening, Functional mobility training, Balance training, Endurance training, Neuromuscular re-education, Patient/Caregiver education & training, Safety education & training, Self-Care / ADL     Restrictions  Position Activity Restriction  Other position/activity restrictions: no activity restrictions noted    Subjective   General  Chart Reviewed: Yes  Patient assessed for rehabilitation services?: Yes  Additional Pertinent Hx: 82yo woman with HLD; HTN; BLE lymphedema; frequent falls; afib; OA. Presented to [facility] and was found to have a 9mm hemorrhagic contusion in her left frontal lobe with some scattered SAH in her left frontal and parietal lobes. 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 Pt admitted to ARU 1/5  Family / Caregiver Present: No  Referring Practitioner: Magen Duran DO  Diagnosis: intracranial hemorrhage 2/2 fall  Subjective  Subjective: In chair agreeable to session, reporting no pain, \"I have still been getting lightheaded, and I had a horrible headache earlier today. \"  No c/o pain during session. States she previously had a headache but is better now.   Social/Functional History  Social/Functional History  Lives With: Alone (dog)  Type of Home: Mobile home  Home Layout: One level  Home Access: Ramped entrance, Stairs to enter with rails  Entrance Stairs - Number of Steps: uses ramp to enter and 4 steps with B HR to exit  Entrance Stairs - Rails: Both  Bathroom Shower/Tub: Walk-in shower, Shower chair with back  Bathroom Toilet: Handicap height (sink for leverage)  Bathroom Equipment: Hand-held shower, Shower chair, Grab bars in shower  Home Equipment: Joce Janus, rolling, Walker, 4 wheeled, Leg , Sock aid, Reacher, Wheelchair-manual (HR on bed)  Has the patient had two or more falls in the past year or any fall with injury in the past year?: Yes (reports 5 falls in month leading up to last fall, looses balance walking to BR without device / with slippery socks on/ slip on rug)  ADL Assistance: Independent  Homemaking Assistance: Independent  Homemaking Responsibilities: Yes ([employer] aide recently started assisting for 3 hours once a week with dusting and vaccuuming, cooks)  Bill Paying/Finance Responsibility: Primary (checkwriting, daughter brought up info yesterday and pt completed online)  Health Care Management: Primary (organizer)  Ambulation Assistance: Independent (In home: RW, cane, HR in hallway; community: RW)  Transfer Assistance: Independent  Active : Yes  Mode of Transportation: Car  Education: 12th grade  Occupation: Retired  Type of Occupation: elementary   Leisure & Hobbies: play bingo, play cards at park, go out to eat  Additional Comments: reports baseline difficulty with RLE and uses leg  to get into car; pt reports son lives in same mobile home park and daughter lives closeby who works from home and could stay with her if necessary and has multiple friends who can check in; gives some conflicting information concerning which device she uses in home/ if she uses a device in home                    Safety Devices  Type of Devices: Call light within reach;Nurse notified;Gait belt; Chair alarm in place; Left in chair; All fall risk precautions in place  Restraints  Restraints Initially in Place: No           ADL  Feeding: Beverage management;Setup  Grooming: Contact guard assistance (stance at sink, oral care, comb hair, no LOB)  LE Dressing:  (able to slip on crocs with SBA seated in recliner chair)  Toileting: Contact guard assistance     Activity Tolerance  Activity Tolerance: Patient tolerated evaluation without incident;Patient limited by endurance           Cognition  Overall Cognitive Status: WFL  Orientation  Overall Orientation Status: Within Normal Limits  Orientation Level: Oriented X4               A/AROM Exercises: UE- shoulder flex/ext, adduction abduction x15 BUE. BLE, marching, hip adduction/abduction x15 with resistance band green for hip abduction         SECOND THERAPY SESSION  Pt sleeping in recliner upon entry, pt easily awakened with vcs. Pt is very pleasant and agreeable to therapy session. Pt reports her headache \"is getting better. \" Pt sit to stand SBA, ambulated SBA to bathroom toilet ~15ft. Pt toilet transfer and toileted SBA - doffed brief/pants. Pt ambulated to sink and stand to sit at sink SBA, pt reports feeling light headed, /64. Pt seated at sink with oral care, wash UB, wash face, comb hair and apply deodorant. Pt donned sweatshirt with seutp. Pt in stance SBA to wash varun area/buttocks. Pt ambulated back to recliner SBA and stand to sit SBA. Pt threaded brief and pants seated on recliner and pulled up in stance SBA. Pt doffed  socks and crocs. Pt required assist to don tenso shapes, pt used sock aid to don  socks and donned crocs. Pt sit to stand SBA and ambulated into gym ~60ft. Pt stand to sit SBA for seated rest break. Pt with reacher, ambulated with rw at [facility] and retrieved 4 pillowcases from floor. Pt placed pillowcases over rw and then transported to laundry container. Pt ambulated back to room SBA and stand to sit EOB SBA. Pt doffed crocs and sit to supine SBA. Call light in reach and bed alarm on. RN notified pt wearing tenso shapes due to swelling and need to be taken off at night.               Goals  Short Term Goals  Time Frame for Short Term Goals: STG=LTG  Short Term Goal 1: Pt will complete LB dressing with SBA - CGA brief, continue goal 1/7  Short Term Goal 2: Pt will don/doff footwear with mod I using AE - goal met socks continue for shoes  Short Term Goal 3: Pt will tolerate 5 minutes in stance for grooming activity. GOAL MET 1/3.  NEW GOAL: 8 mins functional standing activity with SBA - goal not met 1/7  Long Term Goals  Time Frame for Long Term Goals : 10 days  Long Term Goal 1: Pt will perform oral hygiene with mod I  Long Term Goal 2: Pt will perform toileting with mod I  Long Term Goal 3: Pt will perform bathing routine with supervision using AE as needed (i.e long handled sponge)  Long Term Goal 4: Pt will perform LB dressing routine with AE as needed and mod I  Long Term Goal 5: Pt will perform iADL task of laundry with AE as needed and supervision  Patient Goals   Patient goals : \"to learn how to get up from a fall and to be independent so I can go home\"       Therapy Time   Individual Concurrent Group Co-treatment   Time In  1130         Time Out  1200         Minutes  30          Timed Code Treatment Minutes:  30 Minutes  Total Treatment Minutes:  [address], OT    Second Session Therapy Time:   Individual Concurrent Group Co-treatment   Time In 1315         Time Out 1415         Minutes 60         Timed Code Treatment Minutes:  60    Total Treatment Minutes:   60 + 30 = 90 minutes    JUDITH Dixon/L [id number] (Treatment and documentation for 2nd therapy session)

## 2023-01-07 NOTE — PROGRESS NOTES
Alert x4 denies pain/discomfort , perfect serve MD d/t blood pressure, no response noted , pt denies being light headed,dizziness, took all hs meds no acute distress noted call light in reach

## 2023-01-08 PROCEDURE — 2580000003 HC RX 258: Performed by: PHYSICAL MEDICINE & REHABILITATION

## 2023-01-08 PROCEDURE — 6370000000 HC RX 637 (ALT 250 FOR IP): Performed by: PHYSICAL MEDICINE & REHABILITATION

## 2023-01-08 PROCEDURE — 99232 SBSQ HOSP IP/OBS MODERATE 35: CPT | Performed by: PHYSICAL MEDICINE & REHABILITATION

## 2023-01-08 PROCEDURE — 1280000000 HC REHAB R&B

## 2023-01-08 RX ORDER — SODIUM CHLORIDE 9 MG/ML
INJECTION, SOLUTION INTRAVENOUS CONTINUOUS
Status: ACTIVE | OUTPATIENT
Start: 2023-01-08 | End: 2023-01-08

## 2023-01-08 RX ADMIN — ACETAMINOPHEN 650 MG: 325 TABLET ORAL at 05:18

## 2023-01-08 RX ADMIN — SODIUM CHLORIDE, PRESERVATIVE FREE 10 ML: 5 INJECTION INTRAVENOUS at 19:48

## 2023-01-08 RX ADMIN — SODIUM CHLORIDE, PRESERVATIVE FREE 10 ML: 5 INJECTION INTRAVENOUS at 08:11

## 2023-01-08 RX ADMIN — ALLOPURINOL 100 MG: 100 TABLET ORAL at 08:11

## 2023-01-08 RX ADMIN — MONTELUKAST 10 MG: 10 TABLET, FILM COATED ORAL at 08:10

## 2023-01-08 RX ADMIN — SODIUM CHLORIDE: 9 INJECTION, SOLUTION INTRAVENOUS at 09:12

## 2023-01-08 RX ADMIN — ATORVASTATIN CALCIUM 10 MG: 10 TABLET, FILM COATED ORAL at 08:10

## 2023-01-08 RX ADMIN — LEVETIRACETAM 500 MG: 500 TABLET, FILM COATED ORAL at 08:10

## 2023-01-08 RX ADMIN — AMIODARONE HYDROCHLORIDE 100 MG: 200 TABLET ORAL at 08:10

## 2023-01-08 RX ADMIN — LEVETIRACETAM 500 MG: 500 TABLET, FILM COATED ORAL at 19:48

## 2023-01-08 RX ADMIN — LEVOTHYROXINE SODIUM 75 MCG: 0.07 TABLET ORAL at 06:53

## 2023-01-08 ASSESSMENT — PAIN DESCRIPTION - LOCATION: LOCATION: HEAD

## 2023-01-08 ASSESSMENT — PAIN SCALES - GENERAL
PAINLEVEL_OUTOF10: 0
PAINLEVEL_OUTOF10: 3
PAINLEVEL_OUTOF10: 0
PAINLEVEL_OUTOF10: 0

## 2023-01-08 ASSESSMENT — PAIN DESCRIPTION - ORIENTATION: ORIENTATION: ANTERIOR

## 2023-01-08 ASSESSMENT — PAIN - FUNCTIONAL ASSESSMENT: PAIN_FUNCTIONAL_ASSESSMENT: PREVENTS OR INTERFERES SOME ACTIVE ACTIVITIES AND ADLS

## 2023-01-08 ASSESSMENT — PAIN DESCRIPTION - DESCRIPTORS: DESCRIPTORS: ACHING;DISCOMFORT

## 2023-01-08 NOTE — PROGRESS NOTES
Department of Physical Medicine & Rehabilitation  Progress Note    Patient Identification:  Shahid Seals  [id number]  : 1935  Admit date: 2023    Chief Complaint: SDH (subdural hematoma)    Subjective:   Low BP overnight. She admits to not drinking much water the past few days. Gentle fluids started. She is resting in bed today on exam. No other new complaints. Ready for therapy tomorrow. ROS: No f/c, n/v, cp     Objective:  Patient Vitals for the past 24 hrs:   BP Temp Temp src Pulse Resp SpO2   23 0800 (!) 98/45 98.9 °F (37.2 °C) Oral 53 16 --   23 (!) 110/50 97.9 °F (36.6 °C) Oral -- 16 97 %       Const: Alert. No distress, pleasant. HEENT: Normocephalic, atraumatic. Normal sclera/conjunctiva. MMM. - healing ecchymosis   CV: Regular rate and rhythm. Resp: No respiratory distress. Lungs CTAB. Abd: Soft, nontender, nondistended, NABS+   Ext: No edema. Neuro: Alert, oriented, appropriately interactive. Psych: Cooperative, appropriate mood and affect    Laboratory data: Available via EMR. Last 24 hour lab  No results found for this or any previous visit (from the past 24 hour(s)). Therapy progress:  PT  Position Activity Restriction  Other position/activity restrictions: no activity restrictions noted  Objective     Sit to Stand: Contact guard assistance (at recliner/ EOB/ wc/ commode with RW, VC for hand placement)  Stand to Sit: Contact guard assistance (at recliner/ EOB/ wc/ commode with RW, VC for hand placement)  Device: Rolling Walker  Assistance: Contact guard assistance  Distance: 15', 200' (including ascent/descent of 15' ramp), 75', 10'  OT  PT Equipment Recommendations  Equipment Needed: No  Other: pt owns cane/RW/rollator  Toilet - Technique: Ambulating  Equipment Used: Grab bars  Toilet Transfers Comments: Pt pulling self up with 2WW  Assessment        SLP          Body mass index is 33.41 kg/m². Assessment and Plan:  1.  Intracranial hemorrhage - 9 mm left subdural hematoma with scattered foci of SAH 2/2 mechanical fall  - Keppra 500 mg BID for seizure ppx  - PT/OT  - SBP goal <160   - Neurology & NSGY following     2. Hypertension  - SBP goal <160  - PRN in place     3. Normocytic anemia 2/2 fall  - Hgb 7.6 with 9.2 on admission; continue to monitor     4.  Hypothyroidism   - Continue home Synthroid     - start NS for 1000mL at 100hr    Rehab Progress: Improving  Anticipated Dispo: home  Services/DME: KAREN  ELOS: KAREN Jules D.O. M.P.H  PM&R  1/8/2023  10:56 AM

## 2023-01-08 NOTE — PROGRESS NOTES
A&O.  B/P low this am.  C/o dizziness. N.O. per Dr Luther Cueto for NSS @100 ml/hr for ten hours. IV Infusing. Denies pain at this time. Denies headache currently. In bed. Safety measures in place. Assessment complete. BLE edematous. Tensos in place. Elevated on pillow.

## 2023-01-08 NOTE — PROGRESS NOTES
Shift assessment complete, VSS, afebrile, medications taken without difficulty. Pt. Remains A & O X 4, sleeping comfortably thus far during the night. Pt. Ambulates CGA X1 walker/GB, remains A & O X 4. Pt. Calls out appropriately, call light and over bed table within reach. Hourly rounding and frequent visual checks in place.

## 2023-01-08 NOTE — PLAN OF CARE
Problem: Discharge Planning  Goal: Discharge to home or other facility with appropriate resources  1/8/2023 0042 by Rambo Cardona, RN  Outcome: Progressing  Flowsheets (Taken 1/7/2023 2011)  Discharge to home or other facility with appropriate resources: Identify discharge learning needs (meds, wound care, etc)     Problem: Pain  Goal: Verbalizes/displays adequate comfort level or baseline comfort level  1/8/2023 1149 by Marixa Evangelista RN  Outcome: Progressing  1/8/2023 0042 by Rambo Cardona, RN  Outcome: Progressing

## 2023-01-08 NOTE — PLAN OF CARE
Problem: Discharge Planning  Goal: Discharge to home or other facility with appropriate resources  1/8/2023 0042 by Neno Kitchen RN  Outcome: Progressing  Flowsheets (Taken 1/7/2023 2011)  Discharge to home or other facility with appropriate resources: Identify discharge learning needs (meds, wound care, etc)    Problem: Pain  Goal: Verbalizes/displays adequate comfort level or baseline comfort level  Outcome: Progressing   Pt has no c/o pain at this time. Will continue to reassess pain level throughout shift and provide interventions as needed. Problem: Skin/Tissue Integrity  Goal: Absence of new skin breakdown  Description: 1. Monitor for areas of redness and/or skin breakdown  2. Assess vascular access sites hourly  3. Every 4-6 hours minimum:  Change oxygen saturation probe site  4. Every 4-6 hours:  If on nasal continuous positive airway pressure, respiratory therapy assess nares and determine need for appliance change or resting period. Outcome: Progressing   No new skin issues noted thus far. Problem: Safety - Adult  Goal: Free from fall injury  Outcome: Progressing  Free From Fall Injury: Instruct family/caregiver on patient safety   Pt remains free from accidental injury during this stay on the ARU. Will continue to monitor pt and assess per schedule and prn.

## 2023-01-09 LAB
ANION GAP SERPL CALCULATED.3IONS-SCNC: 8 MMOL/L (ref 3–16)
BASOPHILS ABSOLUTE: 0.1 K/UL (ref 0–0.2)
BASOPHILS RELATIVE PERCENT: 1.3 %
BUN BLDV-MCNC: 16 MG/DL (ref 7–20)
CALCIUM SERPL-MCNC: 8 MG/DL (ref 8.3–10.6)
CHLORIDE BLD-SCNC: 102 MMOL/L (ref 99–110)
CO2: 27 MMOL/L (ref 21–32)
CREAT SERPL-MCNC: 1.1 MG/DL (ref 0.6–1.2)
EOSINOPHILS ABSOLUTE: 0.1 K/UL (ref 0–0.6)
EOSINOPHILS RELATIVE PERCENT: 2.6 %
GFR SERPL CREATININE-BSD FRML MDRD: 49 ML/MIN/{1.73_M2}
GLUCOSE BLD-MCNC: 88 MG/DL (ref 70–99)
HCT VFR BLD CALC: 23.6 % (ref 36–48)
HEMOGLOBIN: 7.7 G/DL (ref 12–16)
LYMPHOCYTES ABSOLUTE: 1 K/UL (ref 1–5.1)
LYMPHOCYTES RELATIVE PERCENT: 25.3 %
MCH RBC QN AUTO: 29.3 PG (ref 26–34)
MCHC RBC AUTO-ENTMCNC: 32.5 G/DL (ref 31–36)
MCV RBC AUTO: 90.3 FL (ref 80–100)
MONOCYTES ABSOLUTE: 0.5 K/UL (ref 0–1.3)
MONOCYTES RELATIVE PERCENT: 11.9 %
NEUTROPHILS ABSOLUTE: 2.4 K/UL (ref 1.7–7.7)
NEUTROPHILS RELATIVE PERCENT: 58.9 %
PDW BLD-RTO: 16.7 % (ref 12.4–15.4)
PLATELET # BLD: 169 K/UL (ref 135–450)
PMV BLD AUTO: 9.4 FL (ref 5–10.5)
POTASSIUM REFLEX MAGNESIUM: 4.4 MMOL/L (ref 3.5–5.1)
RBC # BLD: 2.62 M/UL (ref 4–5.2)
SODIUM BLD-SCNC: 137 MMOL/L (ref 136–145)
WBC # BLD: 4.1 K/UL (ref 4–11)

## 2023-01-09 PROCEDURE — 97530 THERAPEUTIC ACTIVITIES: CPT

## 2023-01-09 PROCEDURE — 97535 SELF CARE MNGMENT TRAINING: CPT

## 2023-01-09 PROCEDURE — 80048 BASIC METABOLIC PNL TOTAL CA: CPT

## 2023-01-09 PROCEDURE — 97116 GAIT TRAINING THERAPY: CPT

## 2023-01-09 PROCEDURE — 1280000000 HC REHAB R&B

## 2023-01-09 PROCEDURE — 2580000003 HC RX 258: Performed by: PHYSICAL MEDICINE & REHABILITATION

## 2023-01-09 PROCEDURE — 85025 COMPLETE CBC W/AUTO DIFF WBC: CPT

## 2023-01-09 PROCEDURE — 36415 COLL VENOUS BLD VENIPUNCTURE: CPT

## 2023-01-09 PROCEDURE — 97110 THERAPEUTIC EXERCISES: CPT

## 2023-01-09 PROCEDURE — 99232 SBSQ HOSP IP/OBS MODERATE 35: CPT | Performed by: PHYSICAL MEDICINE & REHABILITATION

## 2023-01-09 PROCEDURE — 6370000000 HC RX 637 (ALT 250 FOR IP): Performed by: PHYSICAL MEDICINE & REHABILITATION

## 2023-01-09 RX ADMIN — MONTELUKAST 10 MG: 10 TABLET, FILM COATED ORAL at 11:07

## 2023-01-09 RX ADMIN — ACETAMINOPHEN 650 MG: 325 TABLET ORAL at 13:05

## 2023-01-09 RX ADMIN — ACETAMINOPHEN 650 MG: 325 TABLET ORAL at 21:51

## 2023-01-09 RX ADMIN — SODIUM CHLORIDE, PRESERVATIVE FREE 10 ML: 5 INJECTION INTRAVENOUS at 20:17

## 2023-01-09 RX ADMIN — SODIUM CHLORIDE, PRESERVATIVE FREE 10 ML: 5 INJECTION INTRAVENOUS at 11:09

## 2023-01-09 RX ADMIN — LEVETIRACETAM 500 MG: 500 TABLET, FILM COATED ORAL at 11:07

## 2023-01-09 RX ADMIN — AMIODARONE HYDROCHLORIDE 100 MG: 200 TABLET ORAL at 11:06

## 2023-01-09 RX ADMIN — LEVETIRACETAM 500 MG: 500 TABLET, FILM COATED ORAL at 20:17

## 2023-01-09 RX ADMIN — LEVOTHYROXINE SODIUM 75 MCG: 0.07 TABLET ORAL at 06:33

## 2023-01-09 RX ADMIN — ALLOPURINOL 100 MG: 100 TABLET ORAL at 11:07

## 2023-01-09 RX ADMIN — ACETAMINOPHEN 650 MG: 325 TABLET ORAL at 00:28

## 2023-01-09 RX ADMIN — ATORVASTATIN CALCIUM 10 MG: 10 TABLET, FILM COATED ORAL at 11:07

## 2023-01-09 ASSESSMENT — PAIN SCALES - WONG BAKER
WONGBAKER_NUMERICALRESPONSE: 0

## 2023-01-09 ASSESSMENT — PAIN - FUNCTIONAL ASSESSMENT
PAIN_FUNCTIONAL_ASSESSMENT: PREVENTS OR INTERFERES SOME ACTIVE ACTIVITIES AND ADLS
PAIN_FUNCTIONAL_ASSESSMENT: PREVENTS OR INTERFERES SOME ACTIVE ACTIVITIES AND ADLS
PAIN_FUNCTIONAL_ASSESSMENT: ACTIVITIES ARE NOT PREVENTED
PAIN_FUNCTIONAL_ASSESSMENT: PREVENTS OR INTERFERES SOME ACTIVE ACTIVITIES AND ADLS

## 2023-01-09 ASSESSMENT — PAIN DESCRIPTION - DESCRIPTORS
DESCRIPTORS: ACHING;BURNING
DESCRIPTORS: ACHING

## 2023-01-09 ASSESSMENT — PAIN SCALES - GENERAL
PAINLEVEL_OUTOF10: 0
PAINLEVEL_OUTOF10: 0
PAINLEVEL_OUTOF10: 8
PAINLEVEL_OUTOF10: 3
PAINLEVEL_OUTOF10: 0
PAINLEVEL_OUTOF10: 5
PAINLEVEL_OUTOF10: 3

## 2023-01-09 ASSESSMENT — PAIN DESCRIPTION - FREQUENCY
FREQUENCY: INTERMITTENT
FREQUENCY: INTERMITTENT

## 2023-01-09 ASSESSMENT — PAIN DESCRIPTION - ORIENTATION
ORIENTATION: ANTERIOR
ORIENTATION: ANTERIOR
ORIENTATION: LEFT

## 2023-01-09 ASSESSMENT — PAIN DESCRIPTION - PAIN TYPE
TYPE: ACUTE PAIN
TYPE: ACUTE PAIN

## 2023-01-09 ASSESSMENT — PAIN DESCRIPTION - LOCATION
LOCATION: BUTTOCKS
LOCATION: HEAD
LOCATION: BUTTOCKS
LOCATION: BUTTOCKS

## 2023-01-09 ASSESSMENT — PAIN DESCRIPTION - ONSET
ONSET: ON-GOING
ONSET: ON-GOING

## 2023-01-09 NOTE — PROGRESS NOTES
Department of Physical Medicine & Rehabilitation  Progress Note    Patient Identification:  Marbella Hammond  [id number]  : 1935  Admit date: 2023    Chief Complaint: SDH (subdural hematoma)    Subjective:   Overall she states she feels therapy has been going well and strength is improving. She is resting in bed today on exam. Appetite goof. No other new complaints. ROS: No f/c, n/v, cp     Objective:  Patient Vitals for the past 24 hrs:   BP Temp Temp src Pulse Resp SpO2 Weight   23 0030 -- -- -- -- -- -- 174 lb 2.6 oz (79 kg)   23 (!) 105/51 97.7 °F (36.5 °C) Oral 57 16 97 % --     Const: Alert. No distress, pleasant. HEENT: Normocephalic, atraumatic. Normal sclera/conjunctiva. MMM. - healing ecchymosis   CV: Regular rate and rhythm. Resp: No respiratory distress. Lungs CTAB. Abd: Soft, nontender, nondistended, NABS+   Ext: No edema. Neuro: Alert, oriented, appropriately interactive. Psych: Cooperative, appropriate mood and affect    Laboratory data: Available via EMR.    Last 24 hour lab  Recent Results (from the past 24 hour(s))   Basic Metabolic Panel w/ Reflex to MG    Collection Time: 23  6:20 AM   Result Value Ref Range    Sodium 137 136 - 145 mmol/L    Potassium reflex Magnesium 4.4 3.5 - 5.1 mmol/L    Chloride 102 99 - 110 mmol/L    CO2 27 21 - 32 mmol/L    Anion Gap 8 3 - 16    Glucose 88 70 - 99 mg/dL    BUN 16 7 - 20 mg/dL    Creatinine 1.1 0.6 - 1.2 mg/dL    Est, Glom Filt Rate 49 (A) >60    Calcium 8.0 (L) 8.3 - 10.6 mg/dL   CBC auto differential    Collection Time: 23  6:20 AM   Result Value Ref Range    WBC 4.1 4.0 - 11.0 K/uL    RBC 2.62 (L) 4.00 - 5.20 M/uL    Hemoglobin 7.7 (L) 12.0 - 16.0 g/dL    Hematocrit 23.6 (L) 36.0 - 48.0 %    MCV 90.3 80.0 - 100.0 fL    MCH 29.3 26.0 - 34.0 pg    MCHC 32.5 31.0 - 36.0 g/dL    RDW 16.7 (H) 12.4 - 15.4 %    Platelets 979 024 - 763 K/uL    MPV 9.4 5.0 - 10.5 fL    Neutrophils % 58.9 %    Lymphocytes % 25.3 %    Monocytes % 11.9 %    Eosinophils % 2.6 %    Basophils % 1.3 %    Neutrophils Absolute 2.4 1.7 - 7.7 K/uL    Lymphocytes Absolute 1.0 1.0 - 5.1 K/uL    Monocytes Absolute 0.5 0.0 - 1.3 K/uL    Eosinophils Absolute 0.1 0.0 - 0.6 K/uL    Basophils Absolute 0.1 0.0 - 0.2 K/uL       Therapy progress:  PT  Position Activity Restriction  Other position/activity restrictions: no activity restrictions noted  Objective     Sit to Stand: Contact guard assistance (at recliner/ EOB/ wc/ commode with RW, VC for hand placement)  Stand to Sit: Contact guard assistance (at recliner/ EOB/ wc/ commode with RW, VC for hand placement)  Device: Rolling Walker  Assistance: Contact guard assistance  Distance: 15', 200' (including ascent/descent of 15' ramp), 75', 10'  OT  PT Equipment Recommendations  Equipment Needed: No  Other: pt owns cane/RW/rollator  Toilet - Technique: Ambulating  Equipment Used: Grab bars  Toilet Transfers Comments: Pt pulling self up with 2WW  Assessment        SLP          Body mass index is 34.01 kg/m². Assessment and Plan:  1. Intracranial hemorrhage   - 9 mm left subdural hematoma with scattered foci of SAH 2/2 mechanical fall  - Keppra 500 mg BID for seizure ppx  - PT/OT  - SBP goal <160   - Neurology & NSGY following     2. Hypertension  - SBP goal <160  - PRN in place     3. Normocytic anemia 2/2 fall  - Hgb 7.6 with 9.2 on admission; continue to monitor     4. Hypothyroidism   - Continue home Synthroid     - remove stitches 1/11. Rehab Progress: Improving  Anticipated Dispo: home  Services/DME: TBD  ELOS: TBD    Discussed case with JENNIFER RajanO. M.PGALE Ba MD  PGY3 IM Resident       This patient has been seen, examined, and discussed with the resident. I was part of the key critical services provided to the patient. I agree with the residents documentation. This note may have been altered to reflect my own examination findings, impression, and recommendations. AMBER MarinPMirandaH  PM&R  1/9/2023  11:28 AM

## 2023-01-09 NOTE — PROGRESS NOTES
Occupational Therapy  Occupational Therapy  Daily Treatment Note  Patient Name: Cindy Knox  MRN: [id number]    Chart Reviewed: Yes       Other position/activity restrictions: no activity restrictions noted     Additional Pertinent Hx: Patient is an 82 y/o female admitted 12/31 s/p fall. CT head (+) forNew left frontal subdural hematoma measuring up to 10 mm in maximal thickness without significant mass effect or midline shift. Stable 9 mm intraparenchymal hematoma within the anterior left frontal lobe. Stable multi foci of subarachnoid hemorrhage. - all stable on repeat exam.      Diagnosis: intracranial hemorrhage 2/2 fall  Treatment Diagnosis: impaired ADLs, iADLs, functional mobility and transfers    Subjective: Pt seated in recliner upon entry, pt stating \"Can I get a shower, I just feel cruddy. \" Pt pleasant, motivated for therapy session. General Comment: Pt sit to stand SBA and ambulated ~15ft to bathroom tub/shower with rw. Pt tub/shower transfer SBA with use of grab bars. Pt washed UB Mod I and LB SBA in stance for buttocks, LHS for feet. Pt transferred SBA from TTB to toilet, to replicate pt's hoem routine of getting dressed seated on the toilet. While siting on the toilet, the pt voided and provided hygiene care seated. Pt threaded BLEs into brief/pants and pulled up in stance SBA. Pt donned sweatshirt setup. Pt used sock aid to thread  socks, did require assist to don tenso shapes. Pt ambulated several feet to sink and stood in stance Supervision for oral care/comb hair. Pt ambulated to chair with rw at [facility]. Pt stand to sit SBA. Pt completed below UB exercises. Pt served breakfast and feeding Independent. Call light in reach and chair alarm on.     Pain: Denies    Social/Functional History  Lives With: Alone (dog)  Type of Home: Mobile home  Home Layout: One level  Home Access: Ramped entrance, Stairs to enter with rails  Entrance Stairs - Number of Steps: uses ramp to enter and 4 steps with B HR to exit  Entrance Stairs - Rails: Both  Bathroom Shower/Tub: Walk-in shower, Shower chair with back  Bathroom Toilet: Handicap height (sink for leverage)  Bathroom Equipment: Hand-held shower, Shower chair, Grab bars in shower  Home Equipment: [place], rolling, Walker, 4 wheeled, Leg , Sock aid, Reacher, Wheelchair-manual (HR on bed)  Has the patient had two or more falls in the past year or any fall with injury in the past year?: Yes (reports 5 falls in month leading up to last fall, looses balance walking to BR without device / with slippery socks on/ slip on rug)  ADL Assistance: Independent  Homemaking Assistance: Independent  Homemaking Responsibilities: Yes ([employer] aide recently started assisting for 3 hours once a week with dusting and vaccuuming, cooks)  Bill Paying/Finance Responsibility: Primary (checkwriting, daughter brought up info yesterday and pt completed online)  Health Care Management: Primary (organizer)  Ambulation Assistance: Independent (In home: RW, cane, HR in hallway; community: RW)  Transfer Assistance: Independent  Active : Yes  Mode of Transportation: Car  Education: 12th grade  Occupation: Retired  Type of Occupation: elementary   Leisure & Hobbies: play bingo, play cards at park, go out to eat  Additional Comments: reports baseline difficulty with RLE and uses leg  to get into car; pt reports son lives in same mobile home park and daughter lives closeby who works from home and could stay with her if necessary and has multiple friends who can check in; gives some conflicting information concerning which device she uses in home/ if she uses a device in home  Prior Function  ADL Assistance: Independent  Homemaking Assistance: Independent  Ambulation Assistance: Independent (In home: RW, cane, HR in hallway; community: RW)  Transfer Assistance: Independent  Additional Comments: reports baseline difficulty with RLE and uses leg  to get into car; pt reports son lives in same mobile home park and daughter lives closeby who works from home and could stay with her if necessary and has multiple friends who can check in; gives some conflicting information concerning which device she uses in home/ if she uses a device in home    Objective:    Cognition/Orientation:Ox4, WFL    Bed mobility - N/A    Functional Mobility   Sit to Stand:SBA  Stand to Sit:SBA  Bed to Chair Transfer: N/A  Commode Transfer:SBA  Tub/shower transfer: SBA with use of grab bar     ADLs   Grooming: Supervision in stance for oral care/comb hair  Bathing: UB Mod I and LB SBA in stance for buttocks, LHS for feet  UB dressing: setup  LB dressing: SBA (brief/pants)  Toileting: SBA  Footwear: socks sock aid (setup) and assist to don tenso shapes    UE Exercises: with green theraband: 15 reps x 3 sets each of right/left bicep curls and triceps press    Activity Tolerance: Good      Patient Education: use of AE LB dressing, role of OT, HEP    Safety Devices in Place: chair alarm on and call light in reach    [address]  Pt side lying right in bed, reports \"my buttocks doesn't hurt anymore. \" Pt pleasant and agreeable to therapy session. Pt supine to sit Mod I. Pt sit to stand SBA and ambulated SBA to bathroom toilet ~20ft. Pt toilet transfer SBA, pt didn't void (brief mgmt SBA, no hygiene care). Pt stood at sink Supervision to wash hands and then ambulated to therapy gym ~50ft with rw at SBA. Pt with seated rest break. Pt sit to stand SBA and actively participated in dynamic standing balance IADL activity of setting the table at SBA with no rw. Pt with seated rest break and then collected dinnerware items from table and placed in tub. After seated rest break, pt ambulated back to room ~50ft with rw at SBA. Pt stand to sit SBA and sit to side lying right Mod I. Call light in reach and bed alarm on.     Assessment: Pt with shower level ADL this date, completed at A along with LB dressing. Pt does require assist to don tenso shapes, is able to don  socks with sock aid. Pt with no complaints of light headedness and no LOB; pt steady on her feet. Pt continues to benefit from intensive inpt therapy to maximize functional independence and safety. Discharge Recommendations: 24 hr assist and [place] Needs:  grab bar shower, sock aid, rw basket with cup orozco     Therapy Time:   First Session Second Session Group Co-treatment   Time In  730  1345       Time Out  [phone]  [phone]       Minutes  60  30       Timed Code Treatment Minutes:  60 + 30   Total Treatment Minutes:   90 minutes    Goals:  Short Term Goals  Time Frame for Short Term Goals: STG=LTG  Short Term Goal 1: Pt will complete LB dressing with SBA - Goal met 1/9   Short Term Goal 2: Pt will don/doff footwear with mod I using AE - Goal met socks not tenso shapes 1/9  Short Term Goal 3: Pt will tolerate 5 minutes in stance for grooming activity. GOAL MET 1/3. NEW GOAL: 8 mins functional standing activity with SBA - goal not met 1/7  Long Term Goals  Time Frame for Long Term Goals : 10 days  Long Term Goal 1: Pt will perform oral hygiene with mod I  Long Term Goal 2: Pt will perform toileting with mod I  Long Term Goal 3: Pt will perform bathing routine with supervision using AE as needed (i.e long handled sponge)  Long Term Goal 4: Pt will perform LB dressing routine with AE as needed and mod I  Long Term Goal 5: Pt will perform iADL task of laundry with AE as needed and supervision      Plan:      Times Per Week: 90 mins per day / 5x per week   Times Per Day: Once a day    If patient is discharged prior to next treatment, this note will serve as the discharge summary.       Didi Delgado, [address]

## 2023-01-09 NOTE — CARE COORDINATION
Case Management Assessment  Initial Evaluation    Date/Time of Evaluation: 1/9/2023 12:01 PM  Assessment Completed by: Emory Nyhan, LSW    If patient is discharged prior to next notation, then this note serves as note for discharge by case management. Patient Name: Alley Benitez                   YOB: 1935  Diagnosis: SDH (subdural hematoma) [S06. 5XAA]                   Date / Time: 1/5/2023  6:15 PM    Patient Admission Status: REHAB IP   Readmission Risk (Low < 19, Mod (19-27), High > 27): Readmission Risk Score: 15    Current PCP: Edmund Beasley  PCP verified by CM? Chart Reviewed: Yes      History Provided by: chart review - SW will discuss DC date with Pt after Team Conference tomorrow  Patient Orientation: a&o x4  Patient Cognition:     Hospitalization in the last 30 days (Readmission):  No    If yes, Readmission Assessment in CM Navigator will be completed. Advance Directives:      Code Status: Full Code   Patient's Primary Decision Maker is:        Discharge Planning:    Patient lives with: Alone Type of Home: Trailer/Mobile Home  Primary Care Giver:    Patient Support Systems include: Family Members   Current Financial resources:    Current community resources:    Current services prior to admission: Other (Comment)            Current DME:              Type of Home Care services:  None    ADLS  Prior functional level: independent  Current functional level:      PT AM-PAC:   /24  OT AM-PAC:   /24    Family can provide assistance at DC: uncertain at this time; son lives nearby. Would you like Case Management to discuss the discharge plan with any other family members/significant others, and if so, who? Plans to Return to Present Housing: yes  Other Identified Issues/Barriers to RETURNING to current housing: Pt lives alone  Potential Assistance needed at discharge:  Other (Comment)            Potential DME: TBD  Patient expects to discharge to: home   Plan for transportation at discharge: son/family    Financial    Payor: Marilee Newton / Plan: Vika Mahajan PPO / Product Type: Medicare /     Does insurance require precert for SNF: Yes    Potential assistance Purchasing Medications:    Meds-to-Beds request:        [id number] [place] [address] [facility] [phone]  [address]  Phone: [phone] Fax: [phone]    [address] [facility]. [facility] [phone] - [address] [facility] [address]  Phone: [phone] Fax: [phone]      Notes: Additional Case Management Notes:   Pt lives alone in a mobile home; her son lives in same mobile complex. No Home Care Involvement. SW will follow Pt's progress while in ARU and assist with DC plan and needs. The Plan for Transition of Care is related to the following treatment goals of SDH (subdural hematoma) [S06. 5XAA]    IF APPLICABLE: The Patient and/or patient representative Archana Mederos and her family were provided with a choice of provider and agrees with the discharge plan. Freedom of choice list with basic dialogue that supports the patient's individualized plan of care/goals and shares the quality data associated with the providers was provided to:     Patient Representative Name:       The Patient and/or Patient Representative Agree with the Discharge Plan?       Allison Pearl [place]  Case Management Department  Ph: [phone]

## 2023-01-09 NOTE — PLAN OF CARE
Problem: Discharge Planning  Goal: Discharge to home or other facility with appropriate resources  1/9/2023 1534 by Marixa Evangelista RN  Outcome: Progressing  Flowsheets (Taken 1/9/2023 0930)  Discharge to home or other facility with appropriate resources: Identify barriers to discharge with patient and caregiver     Problem: Pain  Goal: Verbalizes/displays adequate comfort level or baseline comfort level  1/9/2023 1534 by Marixa Evangelista RN  Outcome: Progressing     Problem: Skin/Tissue Integrity  Goal: Absence of new skin breakdown  Outcome: Progressing

## 2023-01-09 NOTE — PLAN OF CARE
Problem: Discharge Planning  Goal: Discharge to home or other facility with appropriate resources  Outcome: Progressing     Problem: Pain  Goal: Verbalizes/displays adequate comfort level or baseline comfort level  Outcome: Progressing   Pt. Complaint of pain, pain medication administered, see MAR. Pain is being managed with pharmacological and non-pharmacological intervention. Pain level will be decreased or be at a satisfactory level by discharge. Problem: Skin/Tissue Integrity  Goal: Absence of new skin breakdown  Description: 1. Monitor for areas of redness and/or skin breakdown  2. Assess vascular access sites hourly  3. Every 4-6 hours minimum:  Change oxygen saturation probe site  4. Every 4-6 hours:  If on nasal continuous positive airway pressure, respiratory therapy assess nares and determine need for appliance change or resting period. Outcome: Progressing   No new skin issues found thus far this shift. Problem: ABCDS Injury Assessment  Goal: Absence of physical injury  Outcome: Progressing   Pt remains free from accidental injury during this stay on the ARU. Will continue to monitor pt and assess per schedule and prn.

## 2023-01-09 NOTE — PROGRESS NOTES
PT. Remained A&O X 4 this shift, complaint of headache, PRN tylenol administered per order see flow sheet. Pt. Slept well during this shift. Call light and over bed table within reach, patient calls out appropriately. Hourly hounding and frequent visual checks in place.

## 2023-01-09 NOTE — PROGRESS NOTES
Physical Therapy  Facility/Department: [facility] ACUTE REHAB UNIT  Rehabilitation Physical Therapy Treatment Note    NAME: Georgette Laura  : 1935 (81 y.o.)  MRN: [id number]  CODE STATUS: Full Code    Date of Service: 23    Restrictions:  Position Activity Restriction  Other position/activity restrictions: no activity restrictions noted     SUBJECTIVE  Subjective  Subjective: Pt seated in recliner upon approach and agreeable to PT. \"I would like to go practice getting in/out of that car again. I feel like it was really easy for me before and it's usually a lot harder for me. \"  Pain: No c/o pain during session. OBJECTIVE  Cognition  Overall Cognitive Status: WFL  Cognition Comment: decreased STM noted- pt educated on need for clearance from physician to drive by yina and PT multiple times with re-education required  Orientation  Overall Orientation Status: Within Normal Limits  Orientation Level: Oriented X4    Functional Mobility  Transfers  Surface: To chair with arms;From chair with arms; Wheelchair  Sit to [employer] Level: Stand by assist  Stand to Sit  Assistance Level: Stand by assist  Floor Transfer  Assistance Level: Stand by assist  Skilled Clinical Factors: from passenger side and  side, pt repeatedly requsting to attempt from drivers side because she reports she has increased difficulty from this side (increased time/effort demo'd to raise R LE into car), however pt provided with edication that she will need clearance to drive her car      Environmental Mobility  Ambulation  Surface: Level surface  Device: Rolling walker  Distance: 450', >500', 70' + short distances in room/gym  Activity Comments: limited by LE fatigue in first round of ambulation.  Tendancy to abandon RW when going to sit in chair  Assistance Level: Stand by assist  Gait Deviations: Slow jennifer;Decreased step length bilateral;Decreased heel strike right (decreased R heel strike with fatigue, L trunk sway with each step)  Skilled Clinical Factors: VC for upright posture, safe placement of RW when preparing to sit in chair, and increased step height  Stairs  Stair Height: 6''  Device: Bilateral handrails  Number of Stairs: 12  Assistance Level: Contact guard assist    PT Exercises  Dynamic Sitting Balance Exercises: Pt completes balance activities standing on airex including: reaching outside LAMONT and crossbody for high fives with periodic UE support on RW with shoulder width L[place] with CGA and NBOS with CGA-min; reaching outside LAM[place] and cross body for beanbag toss activity without UE support with shoulderwidth L[place] for 2 x ~ 1 min with min>>CGA and with NBOS for 1 x ~ 1 min with CGA-min. Second Session: Pt supine in bed alert and agreeable to session. Pt reports pain in buttock. Piriformis and HS stretches performed 2 x30s BLE . Pt reports some relief . RN notified of pain. Pt supine to sit with supervision. Pt sit <> stand from EOB, chair, and couch with SBA and RW. Cues for hand placement. Pt then ambulated 2x 150ft with RW and SBA. Cues for posture, step length, heel strike . Pt then performed navigation around obstacles including lateral stepping with SBA 2x 50 ft . Pt left seated in recliner with alarm on,call light in reach , and all needs met. ASSESSMENT/PROGRESS TOWARDS GOALS    Assessment  Assessment: Pt demonstrates improved endurance through increased distance ambualted and increased # of steps completed. Requirs cues for increased R LE clearance and safe use of RW. Pt demo's progress in dynamic standing balance on uneven surfaces throughout session. Pt would benefit from continued skilled PT inorder to maximize functional mobility and independence.   Activity Tolerance: Patient tolerated evaluation without incident;Patient limited by endurance  PT Equipment Recommendations  Equipment Needed: No  Other: pt owns cane/RW/rollator    Goals  Patient Goals   Patient Goals : to go home  Short Term Goals  Time Frame for Short Term Goals: 10 days- all ongoing  Short Term Goal 1: pt will perform bed mobility independently  Short Term Goal 2: pt will perform sit<>stand with LRAD and mod I  Short Term Goal 3: pt will ambulate 150' with LRAD and mod I  Short Term Goal 4: Pt will complete ascent/descent of 10' ramp with LRAD and mod I  Short Term Goal 5: Pt will compelte ascent/descent of 4 steps with B HR and mod I    PLAN OF CARE/SAFETY  Physcial Therapy Plan  Days Per Week: 6 Days  Hours Per Day: 1 hour  Therapy Duration: 10 Days  Current Treatment Recommendations: Strengthening;Balance training;Gait training;Functional mobility training;Transfer training; Endurance training;Patient/Caregiver education & training; Therapeutic activities; Safety education & training  Safety Devices  Type of Devices: Call light within reach;Gait belt; Chair alarm in place; Left in chair; All fall risk precautions in place  Restraints  Restraints Initially in Place: No    EDUCATION  Education  Education Given To: Patient  Education Provided: Role of Therapy; Safety;Transfer Training;Mobility Training;Energy Conservation  Education Method: Verbal  Barriers to Learning: Cognition  Education Outcome: Verbalized understanding;Continued education needed        Therapy Time   Individual Concurrent Group Co-treatment   Time In 0930         Time Out 1030         Minutes 60           Timed Code Treatment Minutes: [address] [place], [place] [id number]      Second Session Therapy Time:   Individual Concurrent Group Co-treatment   Time In 1130         Time Out 1200         Minutes 30           Timed Code Treatment Minutes:  60+30    Total Treatment Minutes:  90

## 2023-01-09 NOTE — DISCHARGE INSTRUCTIONS
Extra Heart Failure sites:   https://[employer]/ --- this is American Heart Association interactive Healthier Living with Heart Failure guidebook. Please copy and paste link into search bar. Use your mouse to scroll through the pages. Lots and lots of info / tips    HF Selfridge marquis  --- free smart phone marquis available for [employer] and [employer]. Use your phone to track sodium / fluid intake,  symptoms, weight, etc.    [employer] - website-- [employer] is a dialysis company. All dialysis patients follow a renal diet which IS low sodium!! This website offers free seasonal cookbooks.   Each quarter, they will release 25-30 new recipes with a breakdown of calories, sodium, glucose, etc    [email or web address]Crimson/recipes -- more free recipes

## 2023-01-10 PROCEDURE — 97535 SELF CARE MNGMENT TRAINING: CPT

## 2023-01-10 PROCEDURE — 97530 THERAPEUTIC ACTIVITIES: CPT

## 2023-01-10 PROCEDURE — 2580000003 HC RX 258: Performed by: PHYSICAL MEDICINE & REHABILITATION

## 2023-01-10 PROCEDURE — 97110 THERAPEUTIC EXERCISES: CPT

## 2023-01-10 PROCEDURE — 99233 SBSQ HOSP IP/OBS HIGH 50: CPT | Performed by: PHYSICAL MEDICINE & REHABILITATION

## 2023-01-10 PROCEDURE — 97116 GAIT TRAINING THERAPY: CPT

## 2023-01-10 PROCEDURE — 6370000000 HC RX 637 (ALT 250 FOR IP): Performed by: PHYSICAL MEDICINE & REHABILITATION

## 2023-01-10 PROCEDURE — 1280000000 HC REHAB R&B

## 2023-01-10 RX ADMIN — MONTELUKAST 10 MG: 10 TABLET, FILM COATED ORAL at 08:53

## 2023-01-10 RX ADMIN — ACETAMINOPHEN 650 MG: 325 TABLET ORAL at 17:09

## 2023-01-10 RX ADMIN — ACETAMINOPHEN 650 MG: 325 TABLET ORAL at 20:19

## 2023-01-10 RX ADMIN — ALLOPURINOL 100 MG: 100 TABLET ORAL at 08:53

## 2023-01-10 RX ADMIN — ACETAMINOPHEN 650 MG: 325 TABLET ORAL at 05:28

## 2023-01-10 RX ADMIN — LEVOTHYROXINE SODIUM 75 MCG: 0.07 TABLET ORAL at 05:28

## 2023-01-10 RX ADMIN — ATORVASTATIN CALCIUM 10 MG: 10 TABLET, FILM COATED ORAL at 08:53

## 2023-01-10 RX ADMIN — AMIODARONE HYDROCHLORIDE 100 MG: 200 TABLET ORAL at 08:53

## 2023-01-10 RX ADMIN — SODIUM CHLORIDE, PRESERVATIVE FREE 10 ML: 5 INJECTION INTRAVENOUS at 20:16

## 2023-01-10 RX ADMIN — BISACODYL 5 MG: 5 TABLET, COATED ORAL at 08:53

## 2023-01-10 RX ADMIN — SODIUM CHLORIDE, PRESERVATIVE FREE 10 ML: 5 INJECTION INTRAVENOUS at 08:53

## 2023-01-10 RX ADMIN — LEVETIRACETAM 500 MG: 500 TABLET, FILM COATED ORAL at 08:53

## 2023-01-10 RX ADMIN — LEVETIRACETAM 500 MG: 500 TABLET, FILM COATED ORAL at 20:19

## 2023-01-10 ASSESSMENT — PAIN DESCRIPTION - DESCRIPTORS
DESCRIPTORS: ACHING
DESCRIPTORS: ACHING

## 2023-01-10 ASSESSMENT — PAIN DESCRIPTION - LOCATION
LOCATION: HEAD
LOCATION: HEAD

## 2023-01-10 ASSESSMENT — PAIN SCALES - GENERAL
PAINLEVEL_OUTOF10: 0
PAINLEVEL_OUTOF10: 0
PAINLEVEL_OUTOF10: 3
PAINLEVEL_OUTOF10: 3
PAINLEVEL_OUTOF10: 2
PAINLEVEL_OUTOF10: 3

## 2023-01-10 ASSESSMENT — PAIN SCALES - WONG BAKER: WONGBAKER_NUMERICALRESPONSE: 0

## 2023-01-10 ASSESSMENT — PAIN DESCRIPTION - ONSET: ONSET: ON-GOING

## 2023-01-10 ASSESSMENT — PAIN DESCRIPTION - PAIN TYPE: TYPE: ACUTE PAIN

## 2023-01-10 ASSESSMENT — PAIN DESCRIPTION - ORIENTATION
ORIENTATION: MID
ORIENTATION: MID

## 2023-01-10 ASSESSMENT — PAIN DESCRIPTION - FREQUENCY: FREQUENCY: INTERMITTENT

## 2023-01-10 NOTE — PROGRESS NOTES
Department of Physical Medicine & Rehabilitation  Progress Note    Patient Identification:  Ariel Meehan  [id number]  : 1935  Admit date: 2023    Chief Complaint: SDH (subdural hematoma)    Subjective:   Seen with PT today. She is doing well. Continues to have occasional headaches. She would like to DC home ASAP. Improving overall. Team conference today. Did not sleep very well las night. ROS: No f/c, n/v, cp     Objective:  Patient Vitals for the past 24 hrs:   BP Temp Temp src Pulse Resp SpO2 Weight   01/10/23 0846 (!) 110/52 98.1 °F (36.7 °C) Oral 55 16 95 % --   01/10/23 0532 -- -- -- -- -- -- 172 lb (78 kg)   23 2000 (!) 120/56 98 °F (36.7 °C) Oral 60 18 98 % --   23 1100 (!) 107/55 98 °F (36.7 °C) Oral 51 18 97 % --       Const: Alert. No distress, pleasant. HEENT: Normocephalic, atraumatic. Normal sclera/conjunctiva. MMM. - healing ecchymosis   CV: Regular rate and rhythm. Resp: No respiratory distress. Lungs CTAB. Abd: Soft, nontender, nondistended, NABS+   Ext: No edema. Neuro: Alert, oriented, appropriately interactive. Psych: Cooperative, appropriate mood and affect    Laboratory data: Available via EMR. Last 24 hour lab  No results found for this or any previous visit (from the past 24 hour(s)).       Therapy progress:  PT  Position Activity Restriction  Other position/activity restrictions: no activity restrictions noted  Objective     Sit to Stand: Contact guard assistance (at recliner/ EOB/ wc/ commode with RW, VC for hand placement)  Stand to Sit: Contact guard assistance (at recliner/ EOB/ wc/ commode with RW, VC for hand placement)  Device: Rolling Walker  Assistance: Contact guard assistance  Distance: 15', 200' (including ascent/descent of 15' ramp), 75', 10'  OT  PT Equipment Recommendations  Equipment Needed: No  Other: pt owns cane/RW/rollator  Toilet - Technique: Ambulating  Equipment Used: Grab bars  Toilet Transfers Comments: Pt pulling self up with 2WW  Assessment        SLP          Body mass index is 33.59 kg/m². Assessment and Plan:  1. Intracranial hemorrhage   - 9 mm left subdural hematoma with scattered foci of SAH 2/2 mechanical fall  - Keppra 500 mg BID for seizure ppx  - PT/OT  - SBP goal <160   - Neurology & NSGY following     2. Hypertension  - SBP goal <160  - PRN in place     3. Normocytic anemia 2/2 fall  - Hgb 7.6 with 9.2 on admission; continue to monitor     4. Hypothyroidism   - Continue home Synthroid     - remove stitches 1/11. Rehab Progress: Improving  Anticipated Dispo: home  Services/DME: TBD  ELOS: TBD    Team conference was held today on the patient and discussed directly with the patient utilizing their entire treatment team. Please see separate team note for details. Total treatment time for today's care >35 min. >50% of time spent counseling with patient and coordinating care.        Spencer Rendon D.O. M.P.H  PM&R  1/10/2023  10:34 AM

## 2023-01-10 NOTE — PROGRESS NOTES
Physical Therapy  Facility/Department: [facility] ACUTE REHAB UNIT  Rehabilitation Physical Therapy Treatment Note    NAME: Chelsey Avila  : 1935 (81 y.o.)  MRN: [id number]  CODE STATUS: Full Code    Date of Service: 1/10/23    Restrictions:  Position Activity Restriction  Other position/activity restrictions: no activity restrictions noted     SUBJECTIVE  Subjective  Subjective: Pt seated in recliner alert and agreeable to session . Pt reports she was up multiple times throughout the night due to urination. Pain: No pain at this time but reports had glut pain overnight and yesterday. OBJECTIVE  Cognition  Overall Cognitive Status: WFL  Orientation  Overall Orientation Status: Within Normal Limits  Orientation Level: Oriented X4    Functional Mobility  Supine to Sit  Assistance Level: Supervision  Skilled Clinical Factors: slightly elevated HOB  Balance  Sitting Balance: Modified independent   Standing Balance: Stand by assistance  Standing Balance  Activity: Pt performed standing balance at toilet for pants management and standing at sink to wash hands , wash face ,and brush hair all with RW and SBA. No LOB. Pt also able to sit on couch to perform upper body dressing thredding pants and putting on shoes with mod I  in sitting. No LOB  Transfers  Surface: From bed;From chair with arms;Standard toilet  Additional Factors: Verbal cues; Hand placement cues  Device: Walker  Sit to Stand  Assistance Level: Supervision  Skilled Clinical Factors: cueing for hand placement occasionally  Stand to Sit  Assistance Level: Supervision  Floor Transfer  Skilled Clinical Factors: Pt reports she does not feel comfortable attempting floor transfer yet. PT demonstrated transfer from floor to sitting in chair along with steps and emphisized need to call for help if injured and not get up . Pt then talked through steps with PT performing transfer. No cues needed for patient      Environmental Mobility  Ambulation  Surface: Level surface  Device: Rolling walker  Distance: 350ft ,180ft , 55ft, short distances in room and gym  Activity: Within Room; Within Unit  Activity Comments: prolonged rest breaks between ambulations  Additional Factors: Verbal cues  Assistance Level: Stand by assist  Gait Deviations: Slow jennifer;Decreased step length bilateral;Decreased heel strike right  Skilled Clinical Factors: VC for upright posture, safe placement of RW when preparing to sit in chair, and increased step height  Stairs  Stair Height: 6''  Device: Bilateral handrails  Number of Stairs: 12  Additional Factors: Non-reciprocal going up;Non-reciprocal going down  Assistance Level: Stand by assist  Skilled Clinical Factors: cues for sequencing      PT Exercises  Dynamic Sitting Balance Exercises: Pt performed dynamic balance no AD stepping and tossing bean bag to targed 2x10 steps BLE wtih CGA- Leeroy for balance. Pt very fearful at first but improved with repitition. Pt needed HHA at first then progressed to min- CGA A. Static Standing Balance Exercises: Standing on uneven surface ( airex) reaching outside [place] for trunk rotation 2x 10 and cheast press 2x10 with no AD and CGA. 1 LOB min A to correct . 2 seated rest breaks to complete . Limited by pt fear of falling. Second Session:   Pt seated in recliner upon approach and agreeable to PT. Pt reports B LE pain in deep buttox-lead through B LE seated piriformis stretch and reports decreased pain post stretch. Handout provided and pt instructed to complete x 3 times a day for 60 sec ea side. Competes sit<>stands at recliner/chair with arms/commode with RW with supervision. Pt completes 400' x 2 (including ascent/descent of 79' ramp) + 79' amb with RW, SBA and same gait mechanics/cues as above. Reports dizziness with ambulation (vitals monitored with /61 and HR 56, RN aware).  Pt completes 27' + 15' forward ambulation and 15' backwards ambulation without AD and CGA-min (1 instance of increased assist required during backwards amb during posterior LOB). Pt requires SBA for brief/pants management without UE support and is independent for seated balance while urinating on commode. Upon completion of session pt left seated in recliner with needs in reach and chair alarm on. ASSESSMENT/PROGRESS TOWARDS GOALS  Assessment  Assessment: Pt tolerated well. Pt increased to supervision for bed mobility and sit <> stand with RW. Pt limited by activity tolerance and balance/ fear of falling. Pt remains below baseline of independent and requires continued skilled PT to maximize hab potential  Activity Tolerance: Patient limited by endurance; Patient tolerated treatment well  Discharge Recommendations: 24 hour supervision or assist;Home with Home health PT  PT Equipment Recommendations  Equipment Needed: No  Other: pt owns cane/RW/rollator    Goals  Patient Goals   Patient Goals : to go home  Short Term Goals  Time Frame for Short Term Goals: 10 days- all ongoing  Short Term Goal 1: pt will perform bed mobility independently  Short Term Goal 2: pt will perform sit<>stand with LRAD and mod I  Short Term Goal 3: pt will ambulate 150' with LRAD and mod I  Short Term Goal 4: Pt will complete ascent/descent of 10' ramp with LRAD and mod I  Short Term Goal 5: Pt will compelte ascent/descent of 4 steps with B HR and mod I    PLAN OF CARE/SAFETY  Physcial Therapy Plan  Days Per Week: 6 Days  Hours Per Day: 1.5 hours  Therapy Duration: 10 Days  Current Treatment Recommendations: Strengthening;Balance training;Gait training;Functional mobility training;Transfer training; Endurance training;Patient/Caregiver education & training; Therapeutic activities; Safety education & training;Home exercise program;Stair training;Equipment evaluation, education, & procurement  Safety Devices  Type of Devices: Call light within reach;Gait belt; Chair alarm in place; Left in chair; All fall risk precautions in place  Restraints  Restraints Initially in Place: No    EDUCATION  Education  Education Given To: Patient  Education Provided: Role of Therapy; Safety;Transfer Training;Mobility Training;Energy Conservation  Education Method: Verbal  Barriers to Learning: Cognition  Education Outcome: Verbalized understanding;Continued education needed        Therapy Time   Individual Concurrent Group Co-treatment   Time In 0730         Time Out 0830         Minutes 60           Second Session Therapy Time:   Individual Concurrent Group Co-treatment   Time In 1000         Time Out 1038         Minutes 38           Timed Code Treatment Minutes:  98    Total Treatment Minutes:   98    First Session:   Brandie Vásquez PT, 01/10/23 at 9:00 AM     Second Session Only:   Belle Osgood PT, [address] [place] [address]

## 2023-01-10 NOTE — PLAN OF CARE
Problem: Discharge Planning  Goal: Discharge to home or other facility with appropriate resources  1/9/2023 2341 by Stephanie Jin  Outcome: Progressing  Flowsheets (Taken 1/9/2023 2100)  Discharge to home or other facility with appropriate resources: Identify barriers to discharge with patient and caregiver  1/9/2023 1534 by German Alford RN  Outcome: Progressing  Flowsheets (Taken 1/9/2023 0930)  Discharge to home or other facility with appropriate resources: Identify barriers to discharge with patient and caregiver     Problem: Pain  Goal: Verbalizes/displays adequate comfort level or baseline comfort level  1/9/2023 2341 by Stephanie Jin  Outcome: Progressing  Flowsheets (Taken 1/9/2023 2000)  Verbalizes/displays adequate comfort level or baseline comfort level: Assess pain using appropriate pain scale  1/9/2023 1534 by German Alford RN  Outcome: Progressing     Problem: Skin/Tissue Integrity  Goal: Absence of new skin breakdown  Description: 1. Monitor for areas of redness and/or skin breakdown  2. Assess vascular access sites hourly  3. Every 4-6 hours minimum:  Change oxygen saturation probe site  4. Every 4-6 hours:  If on nasal continuous positive airway pressure, respiratory therapy assess nares and determine need for appliance change or resting period.   Outcome: Progressing     Problem: Safety - Adult  Goal: Free from fall injury  1/9/2023 1534 by German Alford RN  Outcome: Progressing  Flowsheets (Taken 1/9/2023 1531)  Free From Fall Injury: Instruct family/caregiver on patient safety

## 2023-01-10 NOTE — PROGRESS NOTES
Alert denies pain/discomfort took hs meds w/o difficulty , no acute distress noted resp e/e call light in reach

## 2023-01-10 NOTE — PLAN OF CARE
Problem: Discharge Planning  Goal: Discharge to home or other facility with appropriate resources  Outcome: Progressing  Flowsheets (Taken 1/10/2023 0846)  Discharge to home or other facility with appropriate resources: Identify barriers to discharge with patient and caregiver     Problem: Pain  Goal: Verbalizes/displays adequate comfort level or baseline comfort level  Outcome: Progressing  Flowsheets (Taken 1/10/2023 0846)  Verbalizes/displays adequate comfort level or baseline comfort level: Encourage patient to monitor pain and request assistance     Problem: Safety - Adult  Goal: Free from fall injury  Outcome: Progressing

## 2023-01-10 NOTE — CARE COORDINATION
Team Conference held this morning. Team discussed DC date for 1/13/23 and that Pt's grandson will be staying with her. Pt will need a rolling walker and skilled Home Care. MARIA ALEJANDRA met with Pt at bedside and discussed above. Pt stated that her son, who lives nearby, is in [facility] now for 3 months and her hope is to be able to also go to [facility] with him in about 1 month. Pt is agreeable to skilled Home Care with no preference in agency. MARIA ALEJANDRA will make arrangements and left message for Dr. Olamide Akers to place DME order. Will follow.      Lai Jason [place]  Case Management  [phone]

## 2023-01-10 NOTE — PROGRESS NOTES
Occupational Therapy  Facility/Department: [facility] ACUTE REHAB UNIT  Rehabilitation Occupational Therapy Daily Treatment Note    Date: 1/10/23  Patient Name: Cindy Knox       Room: 4806/3987-71  MRN: [id number]  Account: [de-identified]   : 1935  (81 y.o.) Gender: female                    Past Medical History:  has a past medical history of Asthma, Cancer ([address] History of blood transfusion, Hyperlipidemia, Hypertension, Leg swelling, Osteoarthritis, UTI symptoms, and Venous insufficiency of leg. Past Surgical History:   has a past surgical history that includes pr dilation & curettage dx&/ther nonobstetric; pr vaginal hysterectomy uterus 250 gm/< (); pr arthrp kne condyle&platu medial&lat compartments (Right, ); Appendectomy (); Carpal tunnel release; Hemorrhoid surgery; hernia repair; Nasal septum surgery; Tonsillectomy; Hysterectomy; joint replacement (Bilateral); Vein Surgery; and Capsule endoscopy (N/A, 2022). Restrictions  Other position/activity restrictions: no activity restrictions noted    Subjective  Subjective: Pt seated upright in recliner at beginning of both therapy sessions with this writer. Pt pleasant and agreeable to OT treatment session. Pt stating, \"my house is a trailor so it's real small, there's not a ton of room to even use the walker besides the living room\". Objective     Cognition  Overall Cognitive Status: [facility]  Cognition Comment: continues to demo decreased STM; pt at times forgetting where she placed an object or where she found the object at in the kitchen  Orientation  Overall Orientation Status: Within Normal Limits  Orientation Level: Oriented X4         ADL  Feeding  Assistance Level:  Independent  Skilled Clinical Factors: pt ate oatmeal at table top with independence ; pt retrieved spoon from drawer and placed in walker basket for item transport  Grooming/Oral Hygiene  Assistance Level: Stand by assist  Skilled Clinical Factors: pt stood at the sink and washed her hands after toileting; pt needed cues to keep the walker in front of her even at the sink  Putting On/Taking Off Footwear  Equipment Provided: Long-handle shoe horn  Assistance Level: Contact guard assist;Increased time to complete;Verbal cues  Skilled Clinical Factors: Pt was provided demonstration on the long-handle shoe horn. Pt stating, \"I haven't really had success with these in the past\". Pt was provided maximal education on use. Pt was able to successfully demo use of shoe horn to don crocks with back strap on BLE with increased time and VC. Toileting  Assistance Level: Stand by assist  Skilled Clinical Factors: Pt urinated at standard commode and performed front varun care and LB dressing mngmt with SBA  Toilet Transfers  Technique:  (ambulatory)  Equipment: Standard toilet  Assistance Level: Stand by assist  Skilled Clinical Factors: 2WW in front of patient    Instrumental ADL's  Instrumental ADLs: Yes  Meal Prep  Meal Prep Level: Walker  Meal Prep Level of Assistance: Stand by assistance  Meal Preparation: Pt prepared oatmeal packet in ADL room this date. Pt retrieved needed items from cabinet (bowl, spoon, pot, measuring cup, oatmeal packet) and used walker basket to transport items as needed to/from sink to stovetop with VC. Pt initially turned on wrong burner for stove top and needed VC to correct. Pt quickly turned off incorrect burner and turned on correct burner. Pt ensured use of hot pad when using pot and demonstrated good safety awareness as well as good following of instructions provided on oatmeal packet. After completion of task, pt transported oatmeal in walker basket to tabletop to eat. After this, pt retrieved all items and washed them in the sink. Pt used walker basket to transport items back to cabinets to put away. Pt performed entirety of activity with SBA and no LOB evidenced.   Pet Care  Pet Care Level: [address] Level of Assistance: Stand by assistance  Pet Care: Pt has small dog that will be returning with her in home setting. Pt reports that typically the dogs water bowl and food bowl are on the kitchen floor on a mat by the trash can, which is not far from the sink. Pt was able to retrieve the food bowl from floor level with CGA and transport the bowl via 2WW+basket to countertop with SBA. Pt then reports she leaves his food on the table and prepares it from counter height. Pt simulated ax and then put bowl back on floor with CGA. Pt then reporting typically for his water she leaves the bowl on the ground and fills it with a cup of water. Pt practiced transporting glass of water and pouring to bowl on floor. Pt educated on possbility of water spillage and this being a fall hazard. Pt repoting she keeps towel close by to clean up. Recommend repeating activity next session vs future session to ensure safety. Functional Mobility  Device: Rolling walker  Activity: To/From bathroom  Assistance Level: Stand by assist  Transfers  Surface: To chair with arms;From bed  Device: Walker  Sit to Stand  Assistance Level: Stand by assist  Stand to Sit  Assistance Level: Stand by assist  Bed To/From Chair  Technique:  (ambulating)  Assistance Level: Stand by assist  Skilled Clinical Factors: + 2WW   OT Exercises    Exercise Treatment:   First session: Pt complaining of discomfort in buttocks, lower back, and sciatica like pain. Pt seated on pressure relief cushion and reporting this helps vs sitting in firmer bottom chair. Pt reporting she had not stretched and was provided education on importance of stretching to relieve discomfort. Pt performed sit<>stand transfer from recliner to edge of bed with SBA and use of 2WW. Pt initially lifted LLE to bed level with use of her BUE to long leg sitting with LLE supported by bed and RLE on the floor. Pt educated on performing light forward hamstring stretch. Pt performed this 5x for 20 second holds.  Pt then repeated the same activity with RLE. Pt reporting feeling maximal stretch in hamstrings. After stretching ax, pt sat on edge of bed with supervision. Pt then performed sit<>stand transfer from bed to 2WW. Pt then performed 5 forward leans with knees slightly flexed and held for 10 seconds before returning to full upright posture. Pt was encouraged not to use 2WW handles to further encourage balance. After ax, pt returned to seated position on edge of bed and performed abdominal crunches. Pt performed 2 sets of 10. Pt with good tolerance to ax and good body alignment. Pt provided education on strengthening of care to facilite balance for ADLs and iADLs task. At end of the session, pt returned to chair with SBA, with chair alarm on and all needs met. Pt set up with lunch tray. Assessment  Assessment  Assessment: Pt tolerated treatment session well. Pt educated on use of 2WW basket for item transporation. Pt practiced iADL task of meal prep this date making oatmeal packet with SBA + VC and performing clean-up with SBA + VC. Therapist and pt reviewed pet care of feeding and watering small dog that will be with pt at discharge. Pt performed ax with CGA for retrieval of bowls from floor level and would benefit from ongoing practice with ax. Pt tolerated LE stretches well to relieve back pain and abdominal strengthening ax to faciliate improved balance. Pt would continue to benefit from ongoing intensive OT services for continued practice with iADLs and ADLs as well as improved balance. Will cont to follow on OT POC. Activity Tolerance: Patient limited by endurance; Patient tolerated treatment well  OT Equipment Recommendations  Other: grab bars in shower, 2WW, hip kit, walker basket  Safety Devices  Safety Devices in place: Yes  Type of devices: Call light within reach; Chair alarm in place; Left in chair  Restraints  Initially in place: No    Patient Education  Education  Education Given To: Patient  Education Provided: Plan of Care;Safety;ADL Function;IADL Function;Equipment;Transfer Training  Education Provided Comments: walker basket, modifications for pet care, use of long handled shoe horn, strengthening exercises to facilitate balance, stretching exercises to faciliate comfort  Education Method: Demonstration;Verbal  Barriers to Learning: Cognition (decreased STM)  Education Outcome: Verbalized understanding;Continued education needed    Plan  Occupational Therapy Plan  Times Per Week: 90 mins per day / 5x per week  Times Per Day: Once a day  Days Per Week: 5 Days  Hours Per Day: 1.5 hours  Current Treatment Recommendations: Strengthening; Functional mobility training;Balance training; Endurance training;Neuromuscular re-education;Patient/Caregiver education & training; Safety education & training;Self-Care / ADL;Positioning    Goals  Patient Goals   Patient goals : \"to learn how to get up from a fall and to be independent so I can go home\"  Short Term Goals  Time Frame for Short Term Goals: STG=LTG  Short Term Goal 1: Pt will complete LB dressing with SBA - CGA brief, continue goal 1/10  Short Term Goal 2: Pt will don/doff footwear with mod I using AE - goal met socks continue for shoes  Short Term Goal 3: Pt will tolerate 5 minutes in stance for grooming activity. GOAL MET 1/3. NEW GOAL: 8 mins functional standing activity with SBA - goal met 1/10.  New goal: Pt will perform filling water bowl from ground level with supervision and no VC to ensure safety with pet care for return home- not met  Long Term Goals  Time Frame for Long Term Goals : 10 days  Long Term Goal 1: Pt will perform oral hygiene with mod I- not observed 1/10  Long Term Goal 2: Pt will perform toileting with mod I- ongoing 1/10  Long Term Goal 3: Pt will perform bathing routine with supervision using AE as needed (i.e long handled sponge)- not observed 1/10  Long Term Goal 4: Pt will perform LB dressing routine with AE as needed and mod I- ongoing  Long Term Goal 5: Pt will perform iADL task of laundry with AE as needed and supervision- not addressed 1/10    AM-PAC Score               Therapy Time   Individual Concurrent Group Co-treatment   Time In 1135         Time Out 1208         Minutes 33               Second Session Therapy Time:   Individual Concurrent Group Co-treatment   Time In 12:45         Time Out 13:55         Minutes 70           Timed Code Treatment Minutes:  33 + 70    Total Treatment Minutes:  3452 Tracey Kim, OT

## 2023-01-11 LAB
ANION GAP SERPL CALCULATED.3IONS-SCNC: 8 MMOL/L (ref 3–16)
BASOPHILS ABSOLUTE: 0 K/UL (ref 0–0.2)
BASOPHILS RELATIVE PERCENT: 1 %
BUN BLDV-MCNC: 14 MG/DL (ref 7–20)
CALCIUM SERPL-MCNC: 8.4 MG/DL (ref 8.3–10.6)
CHLORIDE BLD-SCNC: 104 MMOL/L (ref 99–110)
CO2: 27 MMOL/L (ref 21–32)
CREAT SERPL-MCNC: 0.9 MG/DL (ref 0.6–1.2)
EOSINOPHILS ABSOLUTE: 0.2 K/UL (ref 0–0.6)
EOSINOPHILS RELATIVE PERCENT: 4.9 %
GFR SERPL CREATININE-BSD FRML MDRD: >60 ML/MIN/{1.73_M2}
GLUCOSE BLD-MCNC: 77 MG/DL (ref 70–99)
HCT VFR BLD CALC: 23.9 % (ref 36–48)
HEMOGLOBIN: 7.7 G/DL (ref 12–16)
LYMPHOCYTES ABSOLUTE: 0.9 K/UL (ref 1–5.1)
LYMPHOCYTES RELATIVE PERCENT: 25.2 %
MCH RBC QN AUTO: 29.3 PG (ref 26–34)
MCHC RBC AUTO-ENTMCNC: 32.3 G/DL (ref 31–36)
MCV RBC AUTO: 90.7 FL (ref 80–100)
MONOCYTES ABSOLUTE: 0.4 K/UL (ref 0–1.3)
MONOCYTES RELATIVE PERCENT: 11.5 %
NEUTROPHILS ABSOLUTE: 2 K/UL (ref 1.7–7.7)
NEUTROPHILS RELATIVE PERCENT: 57.4 %
PDW BLD-RTO: 16.9 % (ref 12.4–15.4)
PLATELET # BLD: 195 K/UL (ref 135–450)
PMV BLD AUTO: 9.2 FL (ref 5–10.5)
POTASSIUM REFLEX MAGNESIUM: 4.4 MMOL/L (ref 3.5–5.1)
RBC # BLD: 2.64 M/UL (ref 4–5.2)
SODIUM BLD-SCNC: 139 MMOL/L (ref 136–145)
WBC # BLD: 3.5 K/UL (ref 4–11)

## 2023-01-11 PROCEDURE — 80048 BASIC METABOLIC PNL TOTAL CA: CPT

## 2023-01-11 PROCEDURE — 2580000003 HC RX 258: Performed by: PHYSICAL MEDICINE & REHABILITATION

## 2023-01-11 PROCEDURE — 97530 THERAPEUTIC ACTIVITIES: CPT

## 2023-01-11 PROCEDURE — 97535 SELF CARE MNGMENT TRAINING: CPT

## 2023-01-11 PROCEDURE — 1280000000 HC REHAB R&B

## 2023-01-11 PROCEDURE — 36415 COLL VENOUS BLD VENIPUNCTURE: CPT

## 2023-01-11 PROCEDURE — 97110 THERAPEUTIC EXERCISES: CPT

## 2023-01-11 PROCEDURE — 99232 SBSQ HOSP IP/OBS MODERATE 35: CPT | Performed by: PHYSICAL MEDICINE & REHABILITATION

## 2023-01-11 PROCEDURE — 6370000000 HC RX 637 (ALT 250 FOR IP): Performed by: PHYSICAL MEDICINE & REHABILITATION

## 2023-01-11 PROCEDURE — 97116 GAIT TRAINING THERAPY: CPT

## 2023-01-11 PROCEDURE — 85025 COMPLETE CBC W/AUTO DIFF WBC: CPT

## 2023-01-11 RX ORDER — TRAMADOL HYDROCHLORIDE 50 MG/1
25 TABLET ORAL EVERY 4 HOURS PRN
Status: DISCONTINUED | OUTPATIENT
Start: 2023-01-11 | End: 2023-01-13 | Stop reason: HOSPADM

## 2023-01-11 RX ORDER — MENTHOL 1.4 %
ADHESIVE PATCH, MEDICATED TOPICAL 3 TIMES DAILY PRN
Status: DISCONTINUED | OUTPATIENT
Start: 2023-01-11 | End: 2023-01-13 | Stop reason: HOSPADM

## 2023-01-11 RX ADMIN — MENTHOL, METHYL SALICYLATE: 10; 15 CREAM TOPICAL at 16:26

## 2023-01-11 RX ADMIN — MENTHOL, METHYL SALICYLATE: 10; 15 CREAM TOPICAL at 21:10

## 2023-01-11 RX ADMIN — LEVETIRACETAM 500 MG: 500 TABLET, FILM COATED ORAL at 08:27

## 2023-01-11 RX ADMIN — SODIUM CHLORIDE, PRESERVATIVE FREE 10 ML: 5 INJECTION INTRAVENOUS at 22:12

## 2023-01-11 RX ADMIN — AMIODARONE HYDROCHLORIDE 100 MG: 200 TABLET ORAL at 08:27

## 2023-01-11 RX ADMIN — MONTELUKAST 10 MG: 10 TABLET, FILM COATED ORAL at 08:26

## 2023-01-11 RX ADMIN — ACETAMINOPHEN 650 MG: 325 TABLET ORAL at 02:30

## 2023-01-11 RX ADMIN — ACETAMINOPHEN 650 MG: 325 TABLET ORAL at 06:56

## 2023-01-11 RX ADMIN — ALLOPURINOL 100 MG: 100 TABLET ORAL at 08:27

## 2023-01-11 RX ADMIN — ATORVASTATIN CALCIUM 10 MG: 10 TABLET, FILM COATED ORAL at 08:27

## 2023-01-11 RX ADMIN — LEVETIRACETAM 500 MG: 500 TABLET, FILM COATED ORAL at 21:10

## 2023-01-11 RX ADMIN — TRAMADOL HYDROCHLORIDE 25 MG: 50 TABLET, COATED ORAL at 21:13

## 2023-01-11 RX ADMIN — LEVOTHYROXINE SODIUM 75 MCG: 0.07 TABLET ORAL at 05:15

## 2023-01-11 RX ADMIN — TRAMADOL HYDROCHLORIDE 25 MG: 50 TABLET, COATED ORAL at 16:29

## 2023-01-11 ASSESSMENT — PAIN SCALES - WONG BAKER
WONGBAKER_NUMERICALRESPONSE: 0

## 2023-01-11 ASSESSMENT — PAIN DESCRIPTION - DESCRIPTORS
DESCRIPTORS: TIGHTNESS
DESCRIPTORS: ACHING
DESCRIPTORS: TIGHTNESS;SHOOTING
DESCRIPTORS: ACHING

## 2023-01-11 ASSESSMENT — PAIN DESCRIPTION - ORIENTATION
ORIENTATION: LOWER
ORIENTATION: RIGHT;LEFT

## 2023-01-11 ASSESSMENT — PAIN SCALES - GENERAL
PAINLEVEL_OUTOF10: 7
PAINLEVEL_OUTOF10: 2
PAINLEVEL_OUTOF10: 4
PAINLEVEL_OUTOF10: 3
PAINLEVEL_OUTOF10: 2
PAINLEVEL_OUTOF10: 3

## 2023-01-11 ASSESSMENT — PAIN DESCRIPTION - FREQUENCY
FREQUENCY: INTERMITTENT
FREQUENCY: INTERMITTENT

## 2023-01-11 ASSESSMENT — PAIN - FUNCTIONAL ASSESSMENT
PAIN_FUNCTIONAL_ASSESSMENT: ACTIVITIES ARE NOT PREVENTED

## 2023-01-11 ASSESSMENT — PAIN DESCRIPTION - ONSET
ONSET: ON-GOING
ONSET: ON-GOING

## 2023-01-11 ASSESSMENT — PAIN DESCRIPTION - LOCATION
LOCATION: BUTTOCKS
LOCATION: SACRUM

## 2023-01-11 ASSESSMENT — PAIN DESCRIPTION - PAIN TYPE
TYPE: NEUROPATHIC PAIN
TYPE: NEUROPATHIC PAIN

## 2023-01-11 NOTE — PLAN OF CARE
Problem: Discharge Planning  Goal: Discharge to home or other facility with appropriate resources  1/11/2023 0127 by Jim Dancer, RN  Outcome: Progressing  Discharge to home or other facility with appropriate resources: Identify barriers to discharge with patient and caregiver     Problem: Pain  Goal: Verbalizes/displays adequate comfort level or baseline comfort level  1/11/2023 0127 by Jim Dancer, RN  Outcome: Progressing  Pt has no c/o pain at this time. Will continue to reassess pain level throughout shift and provide interventions as needed. Problem: Skin/Tissue Integrity  Goal: Absence of new skin breakdown  Description: 1. Monitor for areas of redness and/or skin breakdown  2. Assess vascular access sites hourly  3. Every 4-6 hours minimum:  Change oxygen saturation probe site  4. Every 4-6 hours:  If on nasal continuous positive airway pressure, respiratory therapy assess nares and determine need for appliance change or resting period. Outcome: Progressing   Skin is kept clean and dry. Pt. Turns self, encouraged to turn and reposition to relieve pressure off bony prominences to improve or maintain skin integrity. No new skin issues found. No s/sx of infection noted. Will continue to monitor. Problem: Safety - Adult  Goal: Free from fall injury  1/11/2023 0127 by Jim Dancer, RN  Outcome: Progressing  Pt remains free from falls during this stay on the ARU. 1:1 and education provided on the importance of using call light to ask for assistance prior to transfers and ambulation. Pt voices understanding. Will continue to monitor and re-educate as needed. Problem: ABCDS Injury Assessment  Goal: Absence of physical injury  Outcome: Progressing  Note: Pt remains free from accidental injury during this stay on the ARU. Will continue to monitor pt and assess per schedule and prn.

## 2023-01-11 NOTE — PROGRESS NOTES
Pt. Remains A&O X4 VSS, runs [place] at times, afebrile, denies pain thus far. Pt. Sleeping well thoroughout this shift, no s/sx of distress noted. Pt. Calls out appropriately, fall prevention measures remains in place.

## 2023-01-11 NOTE — PROGRESS NOTES
[facility] Discharge Assessment    Transportation  \"Has lack of transportation kept you from medical appointments, meetings, work, or from getting things needed for daily living? \"Check all that apply:  [] A.  Yes, it has kept me from medical appointments or from getting my medications  [] B.  Yes, it has kept me from non-medical meetings, appointments, work, or from getting things that I need  [x] C.  No  [] X. Patient unable to respond  [] Y. Patient declines to respond    Provision of Current Reconciled Medication List to Subsequent Provider at Discharge  [] No, current reconciled medication list not provided to the subsequent provider. [x] Yes, current reconciled medication list provided to the subsequent provider. (**Select route of transmission below**)   [x] Via Electronic Health Record   [] [employer]  [] Verbal (e.g. in person, telephone, video conferencing)  [x] Paper-based (e.g. fax, copies, printouts)   [] Other Methods (e.g. texting, email, CDs)    Provision of Current Reconciled Medication List to Patient at Discharge  [] No, current reconciled medication list not provided to the patient, family and/or caregiver. [x] Yes, current reconciled medication list provided to the patient, family and/or caregiver.  (**Select route of transmission below**)   [x] Via Electronic Health Record (e.g., electronic access to patient portal)   [] Via [employer] Organization  [x] Verbal (e.g. in person, telephone, video conferencing)  [x] Paper-based (e.g. fax, copies, printouts)   [] Other Methods (e.g. texting, email, CDs)    Health Literacy  \"How often do you need to have someone help you when you read instructions, pamphlets, or other written material from your doctor or pharmacy? \"  [x]  0. Never  []  1. Rarely  []  2. Sometimes  []  3. Often  []  4. Always  []  8.   Patient unable to respond    BIMS - **Must be completed in the flowsheet at discharge prior to proceeding with Delirium Assessment**  [x] BIMS completed in flowsheet at discharge    Signs and Symptoms of Delirium  A. Acute Onset Mental Status Change - Is there evidence of an acute change in mental status from the patient's baseline? [x] 0. No  [] 1. Yes    B. Inattention - Did the patient have difficulty focusing attention, for example being easily distractible or having difficulty keeping track of what was being said? [x]  0. Behavior not present  []  1. Behavior continuously present, does not fluctuate  []  2. Behavior present, fluctuates (comes and goes, changes in severity)    C. Disorganized thinking - Was the patient's thinking disorganized or incoherent (rambling or irrelevant conversation, unclear or illogical flow of ideas, or unpredictable switching from subject to subject)? [x]  0. Behavior not present  []  1. Behavior continuously present, does not fluctuate  []  2. Behavior present, fluctuates (comes and goes, changes in severity)    D. Altered level of consciousness - Did the patient have altered level of consciousness as indicated by any of the following criteria? Vigilant - startled easily to any sound or touch  Lethargic - repeatedly dozed off while being asked questions, but responded to voice or touch  Stuporous - very difficulty to arouse and keep aroused for the interview  Comatose - could not be aroused  [x]  0. Behavior not present  []  1. Behavior continuously present, does not fluctuate  []  2. Behavior present, fluctuates (comes and goes, changes in severity)    Mood    \"Over the last 2 weeks, have you been bothered by any of the following problems?\" 1. Symptom Presence    0 = No  1 = Yes  9 = No Response 2.  Symptom Frequency    0 = Never or 1 day  1 = 2-6 days (several days)  2 = 7-11 days (half or more of the days)  3 = 12-14 days (nearly every day)  **Leave blank if 'No Reponse'**      Enter scores in boxes    Column 1 Column 2   Little interest or pleasure in doing things   0 0   Feeling down, depressed, or hopeless   1 0   **If either A or B in column 2 is coded 2 or 3, CONTINUE asking the questions below. If not, END the interview. **     Trouble falling or staying asleep, or sleeping too much       Feeling tired or having little energy       Poor appetite or overeating       Feeling bad about yourself - or that you are a failure or have let yourself or your family down       Trouble concentrating on things, such as reading the newspaper or watching television       Moving or speaking so slowly that other people could have noticed. Or the opposite- being so fidgety or restless that you have been moving around a lot more than usual.       Thoughts that you would be better off dead, or of hurting yourself in some way. Total Severity: Add scores for all frequency responses in column 2 (possible score 0-27, or enter 99 if unable to complete (if symptom frequency (column 2) is blank for 3 or more items). Social Isolation  \"How often do you feel lonely or isolated from those around you? \"  [] 0. Never  [x] 1. Rarely  [] 2. Sometimes  [] 3. Often  [] 4. Always  [] 7. Patient declines to respond  [] 8. Patient unable to respond    Pain Effect on Sleep  \"Over the past 5 days, how much of the time has pain made it hard for you to sleep at night? \"  []  0. Does not apply - I have not had any pain or hurting in the past 5 days  []  1. Rarely or not at all  []  2. Occasionally  [x]  3. Frequently  []  4. Almost constantly  []  8. Unable to answer    **If the patient answers \"0. Does not apply\" to this question, skip the next two \"Pain Effect. Marnell Kwasi Marnell Kwasi \" questions**    Pain Interference with Therapy Activities  \"Over the past 5 days, how often have you limited your participation in rehabilitation therapy sessions due to pain? \"  []  0. Does not apply - I have not received rehabilitation therapy in the past 5 days  [x]  1. Rarely or not at all  []  2. Occasionally  []  3. Frequently  []  4. Almost constantly  []  8. Unable to answer    Pain Interference with Day-to-Day Activities: \"Over the past 5 days, how often have you limited your day-to-day activities (excluding rehabilitation therapy session)? \"  [x]  1. Rarely or not at all  []  2. Occasionally  []  3. Frequently  []  4. Almost constantly  []  8. Unable to answer    Nutritional Approaches  Last 7 Days - Check all of the following nutritional approaches that were received within the last 7 days:  []  A. Parenteral/IV feeding  []  B. Feeding tube (e.g., nasogastric or abdominal (PEG))  []  C. Mechanically altered diet - requires change in texture of food or liquids (e.g., pureed food, thickened liquids)  [x]  D. Therapeutic diet (e.g., low salt, diabetic, low cholesterol)  []  Z. None of the above    At time of Discharge - Check all of the following nutritional approaches that were being received at discharge:  []  A. Parenteral/IV feeding (including IV fluids if needed for hydration, but not as part of dialysis/chemo)  []  B. Feeding tube (e.g., nasogastric or abdominal (PEG))  []  C. Mechanically altered diet - requires change in texture of food or liquids (e.g., pureed food, thickened liquids)  [x]  D. Therapeutic diet (e.g., low salt, diabetic, low cholesterol  []  Z. None of the above    High Risk Drug Classes:  Use and Indication    Is taking: Check if the pt is taking any medications by pharmacological classification, not how it is used, in the following classes  Indication noted:  If column 1 is checked, check if there is an indication noted for all meds in the drug class Is taking  (check all that apply) Indication noted (check all that apply)   Antipsychotic [] []   Anticoagulant [] []   Antibiotic [] []   Opioid [x] [x]   Antiplatelet [] []   Hypoglycemic (including insulin) [] []   None of the above []     Special Treatments, Procedures, and Programs    Check all of the following treatments, procedures, and programs that apply at discharge. At Discharge (check all that apply)   Cancer Treatments   A1. Chemotherapy []           A2. IV []           A3. Oral []           A10. Other []   B1. Radiation []   Respiratory Therapies   C1. Oxygen Therapy [x]           C2. Continuous (continuously for at least 14 hours per day) []           C3. Intermittent [x]           C4. High-concentration []   D1. Suctioning (Does not include oral suctioning) []           D2. Scheduled []           D3. As needed []   E1. Tracheostomy Care []   F1. Invasive Mechanical Ventilator (ventilator or respirator) []   G1. Non-invasive Mechanical Ventilator []           G2. BiPAP []           G3. CPAP []   Other   H1. IV Medications (Do not include sub Q pumps, flushes, Dextrose 50% or lactated ringers) []           H2. Vasoactive medications []           H3. Antibiotics []           H4. Anticoagulation []           H10. Other []   I1. Transfusions []   J1. Dialysis []           J2. Hemodialysis []           J3. Peritoneal dialysis []   O1. IV access (including a catheter in a vein) [x]           O2. Peripheral [x]           O3. Midline []           O4.  Central (PICC, tunneled, port) []      None of the above (select if no Cancer, Respiratory, or Other boxes are checked) []

## 2023-01-11 NOTE — PROGRESS NOTES
Physical Therapy  Facility/Department: [facility] ACUTE REHAB UNIT  Rehabilitation Physical Therapy Treatment Note    NAME: Mary White  : 1935 (81 y.o.)  MRN: [id number]  CODE STATUS: Full Code    Date of Service: 23    Restrictions:  Position Activity Restriction  Other position/activity restrictions: no activity restrictions noted     SUBJECTIVE  Subjective  Subjective: Pt seated in recliner alert and agreeable to session . Pain: No pain at this time but reportsshe has been having intermittant sciatica pain. Reminded of piriformis stretch which helped decrease pain yesterday and instructed to complete throughout the day tto manage cole. OBJECTIVE  Cognition  Overall Cognitive Status: [facility]  Cognition Comment: Pt w/ min impulsivity and decreased safety awareness at times. Orientation  Overall Orientation Status: Within Normal Limits    Functional Mobility  Balance  Sitting Balance: Modified independent  (seated on commode)  Standing Balance: Stand by assistance (supv-SBA for standing balance during brief/pants management and hand washing with UL no UE support)  Standing Balance  Activity: attempts to urinate on commode however is unsucessful  Transfers  Surface: From bed;From chair with arms;Standard toilet  Additional Factors: Verbal cues; Hand placement cues  Device: Walker  Sit to Stand  Assistance Level: Supervision  Skilled Clinical Factors: cueing for hand placement occasionally  Stand to Sit  Assistance Level: Supervision  Skilled Clinical Factors: cueing for hand placement occasionally  Floor Transfer  Skilled Clinical Factors: Pt reports she does not feel comfortable attempting floor transfer yet. PT demonstrated transfer from floor to sitting in chair along with steps and emphisized need to call for help if injured and not get up . Pt then talked through steps with PT performing transfer. No cues needed for patient      Environmental Mobility  Ambulation  Surface: Level surface  Device: Rolling walker (no AD)  Distance: without AD: 120' + 30' (ascent/descent of 15' ramp); with RW: 400' (including navigation of tight spaces in dining room and ascent/descent of 70' ramp) + 150'  Activity: Within Room; Within Unit  Activity Comments: pt demonstrates decreased stability, step height/length, and jennifer without device compared to RW with ambulation more effortful however no overt LOB; prolonged rest breaks required between ambulations  Additional Factors: Verbal cues  Assistance Level: Stand by assist;Contact guard assist (SBA with RW, CGA without device)  Gait Deviations: Slow jennifer;Decreased step length bilateral;Decreased heel strike right  Skilled Clinical Factors: VC for upright posture and increased step height. Demonstrates improved RW management and safe placement when preparing to sit in chair this date. PT Exercises  Dynamic Sitting Balance Exercises: Pt compeltes 4 qaudrant stepping activity without UE support including forward/lateral/backward/diaginal steps first in sequence progressing to random order (#s called out by PT) with cognitive task of multiple #s called out consecutively eventually added with pt able to remember most 4-5 # sequences with only 1 instance of needing help to rememeber sequence. Requires CGA-min throughout activity with increased assist required during posterior/R LObs. Pt completes cone taps onto 3\" cones with B UE support (CGA) >UL UE support (CGA) >no UE support with (CGA-min, with increased assist provided during posterior/R LOB). Pt initially demonstrates difficulty initiating taps onto cones without UE support due to fear of fallign and requires alternating HHA/no UE support in order to build confidence and increase ability to initiate stepping especially when balancing on R LE/tapping with L LE. Increased balance, proprioception, and confidence in R LE balance during L LE taps noted throughout activity.   Second Session : Pt seated in recliner alert and agreeable to session . Pt reports no pain at this time. Pt sit <> stand with supervision and RW from recliner, chair and EOM. Cues for hand placement. Pt ambulated 2x 75 ft and short distances in gym with RW and supervision  cues for posture and step height . Pt supine <> sit on mat table with mod I . No rail and flat HOB. Pt performed supine there ex 2 x 10 BLE bridges, hip abd, hip add, SLR, SAQ, heel slides and ankle pumps. Pt then performed seated exercises 1x 5 BLE marches hip abd, hip add, LAQ, and ankle pumps . Pt given written HEP hand out and educated to complete daily. Pt agreeable. Pt left seated in recliner with alarm on , call light in reach , and all needs met. ASSESSMENT/PROGRESS TOWARDS GOALS       Assessment  Assessment: Pt demonstrates improved balance and confidence in R LE balance during brief single leg stance throughout dynamic standing balance activity without UE support in gym. Pt also demonstrates ability to increase distance ambualted without device with CGA throughout. Requires increased assist due to LOB when completing multidirectional stepping without device. RW continues to be recommended for ambualtion at discharge as pt demonstrates improved balance and gait mechanicss with use of RW. Pt would benefit from continued skilled PT in order to maxmize funcitonal mobility and independece, and decrease risk of falls at home.   Activity Tolerance: Patient tolerated treatment well  Discharge Recommendations: 24 hour supervision or assist;Home with Home health PT  PT Equipment Recommendations  Equipment Needed: Yes  Mobility Devices: Milderd Shad: Rolling  Other: pt owns cane/RW/rollator; would benefit from additional RW to keep in bedroom at home due to narrow hallway/doorway    Goals  Patient Goals   Patient Goals : to go home  Short Term Goals  Time Frame for Short Term Goals: 10 days- all ongoing  Short Term Goal 1: pt will perform bed mobility independently  Short Term Goal 2: pt will perform sit<>stand with LRAD and mod I  Short Term Goal 3: pt will ambulate 150' with LRAD and mod I  Short Term Goal 4: Pt will complete ascent/descent of 10' ramp with LRAD and mod I  Short Term Goal 5: Pt will compelte ascent/descent of 4 steps with B HR and mod I    PLAN OF CARE/SAFETY  Physcial Therapy Plan  Days Per Week: 5 Days  Hours Per Day: 1.5 hours  Therapy Duration: 10 Days  Current Treatment Recommendations: Strengthening;Balance training;Gait training;Functional mobility training;Transfer training; Endurance training;Patient/Caregiver education & training; Therapeutic activities; Safety education & training;Home exercise program;Stair training;Equipment evaluation, education, & procurement  Safety Devices  Type of Devices: Call light within reach;Gait belt; Chair alarm in place; Left in chair; All fall risk precautions in place  Restraints  Restraints Initially in Place: No    EDUCATION  Education  Education Given To: Patient  Education Provided: Role of Therapy; Safety;Transfer Training;Mobility Training;Energy Conservation  Education Provided Comments: benefits of balance exercises  Education Method: Verbal  Barriers to Learning: Cognition  Education Outcome: Verbalized understanding;Continued education needed        Therapy Time   Individual Concurrent Group Co-treatment   Time In 1050         Time Out 1150         Minutes 60           Timed Code Treatment Minutes: Alexandra Acosta [address]. PT, [place] [id number]      Second Session Therapy Time:   Individual Concurrent Group Co-treatment   Time In 0603         Time Out 1315         Minutes 30           Timed Code Treatment Minutes:  60+30    Total Treatment Minutes:  90

## 2023-01-11 NOTE — PROGRESS NOTES
Received report from off-going RN. Agreed to assessment and plan of care. Patient working with OT. Will continue to monitor.

## 2023-01-11 NOTE — PROGRESS NOTES
Occupational Therapy  Facility/Department: [facility] ACUTE REHAB UNIT  Rehabilitation Occupational Therapy Daily Treatment Note    Date: 23  Patient Name: Franklyn Rosa       Room: 67/3194-74  MRN: [id number]  Account: [de-identified]   : 1935  (81 y.o.) Gender: female                    Past Medical History:  has a past medical history of Asthma, Cancer ([address] History of blood transfusion, Hyperlipidemia, Hypertension, Leg swelling, Osteoarthritis, UTI symptoms, and Venous insufficiency of leg. Past Surgical History:   has a past surgical history that includes pr dilation & curettage dx&/ther nonobstetric; pr vaginal hysterectomy uterus 250 gm/< (); pr arthrp kne condyle&platu medial&lat compartments (Right, ); Appendectomy (); Carpal tunnel release; Hemorrhoid surgery; hernia repair; Nasal septum surgery; Tonsillectomy; Hysterectomy; joint replacement (Bilateral); Vein Surgery; and Capsule endoscopy (N/A, 2022). Restrictions  Other position/activity restrictions: no activity restrictions noted    Subjective  Subjective: Pt was seated in recliner chair upon arrival. Pt reported she's been having buttock pain ever since she tried to get up from the floor during therapy. Pt reported she had meds already. Pt was pleasant and agreeable to OT. Pt requested to shower this morning. Objective       Cognition  Overall Cognitive Status: [facility]  Cognition Comment: Pt w/ min impulsivity and decreased safety awareness at times. Orientation  Overall Orientation Status: Within Normal Limits         ADL      Grooming/Oral Hygiene  Assistance Level: Stand by assist  Skilled Clinical Factors: Pt completed oral care in stance at the sink w/ SBA. Upper Extremity Bathing  Assistance Level: Supervision  Skilled Clinical Factors: Pt washed and dried 4/4 components of UE bathing seated on TTB.  Pt required min VCs for safety when leaning forward on the bench  Lower Extremity Bathing  Assistance Level: Stand by assist;Verbal cues  Skilled Clinical Factors: Pt washed and dried 6/6 components of LE bathing seated on TTB and in stance at the  w/ SBA. Min VCs needed for safety in stance at [facility]. Pt also used LH sponge to wash BLEs  Upper Extremity Dressing  Assistance Level: Set-up  Skilled Clinical Factors: Pt donned tshirt seated  Lower Extremity Dressing  Assistance Level: Stand by assist;Verbal cues; Increased time to complete  Skilled Clinical Factors: Pt threaded BLEs into underwear and pants seated w/ SBA in stance to manage clothes over hips. min VCs needed for safety in stance  Putting On/Taking Off Footwear  Assistance Level: Set-up; Verbal cues; Increased time to complete  Skilled Clinical Factors: Pt donned tenso shapes and crocs seated w/ ++ time needed to don both. Pt required use of sock aid to don tenso shapes. min VCs needed for technique  Toileting  Assistance Level: Stand by assist  Skilled Clinical Factors: Pt completed pants management w/ SBA. Pt continent of urine and completed varun care  Toilet Transfers  Technique:  (ambulating)  Equipment: Standard toilet  Assistance Level: Stand by assist  Skilled Clinical Factors: min VCs needed for safety upon approach to toilet  Tub/Shower Transfers  Type: Tub  Transfer From: Elea Bouquet  Transfer To: Tub transfer bench  Assistance Level: Stand by assist;Verbal cues  Skilled Clinical Factors: min VCs needed for safe entry and exit            Functional Mobility  Device: Rolling walker  Activity: To/From bathroom  Assistance Level: Stand by assist  Skilled Clinical Factors: min VCs needed for safety and preventing abandonment of RW  Sit to Stand  Assistance Level: Stand by assist  Stand to Sit  Assistance Level: Stand by assist         Assessment    Assessment  Assessment: Pt demonstrated good participation in OT today as evident by pt ability to complete bathing and dressing tasks w/ SBA.  Pt w/ improved progress w/ footwear today and was able to don tenso shapes and crocs w/ use of AE. Pt is somewhat impulsive and requires VCs for safety. Pt reported she will not have 24 hr assistance at d/c. Pt is motivated and continues to benefit from OT. Cont OT per POC  Activity Tolerance: Patient tolerated treatment well  Discharge Recommendations: 24 hour supervision or assist;Home with Home health OT  OT Equipment Recommendations  Other: grab bars in shower, 2WW, hip kit, walker basket  Safety Devices  Safety Devices in place: Yes  Type of devices: Call light within reach; Left in chair;Chair alarm in place    Patient Education  Education  Education Given To: Patient  Education Provided: Mobility Training;Equipment;Transfer Training;Precautions; Safety;ADL Function  Education Provided Comments: use of sock aid, safety in the bathroom, d/c planning  Education Method: Demonstration;Verbal  Barriers to Learning: Cognition  Education Outcome: Verbalized understanding;Continued education needed      2nd session: pt was seated in recliner chair upon arrival. Pt was pleasant and agreeable to OT. Fall prevention education completed this session including personal risk factors and identification of fall hazards due to hx of frequent falls. Information from the personal risk factors questionnaire revealed the following: history of previous falls, BUE/BLE weakness, avoidance of daily activities/exercise due to fear of falling, incontinence, changes in eyesight, dizziness upon standing, hearing loss, foot ulcers/bunions/hammertones/callouses, shuffling gait and unsteadiness.  Based on pt's response of \"yes\" to these items, the following recommendations were discussed: use of a personal emergency response service (life alert), home exercise programs, group exercise classes, incontinence treatment and medications, use of a BSC for night time use, clearly lit pathway to the bathroom, pathways free of clutter, frequent eye doctor appointments, use of night lights in the home, use of tape or paint to identify edges of the stairs, frequent hearing tests, discussion with the doctor regarding postural hypotension, taking time to change positions, identification of side effects of medications and consultation with a pharmacist, frequent podiatry appointments and importance of proper footwear, use of assistive devices, balance therapy, importance of carrying a cell phone at all times. Pt engaged in discussion of fall prevention information and verbalized understanding of information. Pt was left seated in the recliner chair at EOS w/ chair alarm on and call light within reach. Plan  Occupational Therapy Plan  Times Per Week: 90 mins per day / 5x per week  Current Treatment Recommendations: Strengthening; Functional mobility training;Balance training; Endurance training;Neuromuscular re-education;Patient/Caregiver education & training; Safety education & training;Self-Care / ADL;Positioning    Goals  Patient Goals   Patient goals : \"to learn how to get up from a fall and to be independent so I can go home\"  Short Term Goals  Time Frame for Short Term Goals: STG=LTG  Long Term Goals  Time Frame for Long Term Goals : 10 days  Long Term Goal 1: Pt will perform oral hygiene with mod I-ongoing  Long Term Goal 2: Pt will perform toileting with mod I- ongoing  Long Term Goal 3: Pt will perform bathing routine with supervision using AE as needed (i.e long handled sponge)- ongoing  Long Term Goal 4: Pt will perform LB dressing routine with AE as needed and mod I- ongoing  Long Term Goal 5: Pt will perform iADL task of laundry with AE as needed and supervision- ongoing    AM-PAC Score               Therapy Time   Individual Concurrent Group Co-treatment   Time In 0830         Time Out 0930         Minutes 60         Timed Code Treatment Minutes: 60 Minutes     Second Session Therapy Time:   Individual Concurrent Group Co-treatment   Time In 1345         Time Out 1420         Minutes 35           Timed Code Treatment Minutes:  35    Total Treatment Minutes:   60+35= 95   Allyson Dempsey, OT

## 2023-01-11 NOTE — PROGRESS NOTES
Department of Physical Medicine & Rehabilitation  Progress Note    Patient Identification:  Duc Manzo  [id number]  : 1935  Admit date: 2023    Chief Complaint: SDH (subdural hematoma)    Subjective:   Seen in her room today. She complains of pain in bilateral lower back which goes down the back of her leg. She has had surgery in the past for this issue which resolved the pain but now she is having similar pain to before surgery. Asking for conservative treatment today. Working hard in therapy. Plan for discharge at the end of the week. ROS: No f/c, n/v, cp     Objective:  Patient Vitals for the past 24 hrs:   BP Temp Temp src Pulse Resp SpO2 Weight   23 0827 (!) 99/54 98.4 °F (36.9 °C) Oral 60 16 96 % --   23 0509 -- -- -- -- -- -- 171 lb 11.8 oz (77.9 kg)   01/10/23 2015 (!) 120/53 98.5 °F (36.9 °C) Oral 55 16 95 % --       Const: Alert. No distress, pleasant. HEENT: Normocephalic, atraumatic. Normal sclera/conjunctiva. MMM. - healing ecchymosis   CV: Regular rate and rhythm. Resp: No respiratory distress. Lungs CTAB. Abd: Soft, nontender, nondistended, NABS+   Ext: No edema. Neuro: Alert, oriented, appropriately interactive. Psych: Cooperative, appropriate mood and affect    Laboratory data: Available via EMR.    Last 24 hour lab  Recent Results (from the past 24 hour(s))   Basic Metabolic Panel w/ Reflex to MG    Collection Time: 23  6:39 AM   Result Value Ref Range    Sodium 139 136 - 145 mmol/L    Potassium reflex Magnesium 4.4 3.5 - 5.1 mmol/L    Chloride 104 99 - 110 mmol/L    CO2 27 21 - 32 mmol/L    Anion Gap 8 3 - 16    Glucose 77 70 - 99 mg/dL    BUN 14 7 - 20 mg/dL    Creatinine 0.9 0.6 - 1.2 mg/dL    Est, Glom Filt Rate >60 >60    Calcium 8.4 8.3 - 10.6 mg/dL   CBC auto differential    Collection Time: 23  6:39 AM   Result Value Ref Range    WBC 3.5 (L) 4.0 - 11.0 K/uL    RBC 2.64 (L) 4.00 - 5.20 M/uL    Hemoglobin 7.7 (L) 12.0 - 16.0 g/dL Hematocrit 23.9 (L) 36.0 - 48.0 %    MCV 90.7 80.0 - 100.0 fL    MCH 29.3 26.0 - 34.0 pg    MCHC 32.3 31.0 - 36.0 g/dL    RDW 16.9 (H) 12.4 - 15.4 %    Platelets 663 240 - 004 K/uL    MPV 9.2 5.0 - 10.5 fL    Neutrophils % 57.4 %    Lymphocytes % 25.2 %    Monocytes % 11.5 %    Eosinophils % 4.9 %    Basophils % 1.0 %    Neutrophils Absolute 2.0 1.7 - 7.7 K/uL    Lymphocytes Absolute 0.9 (L) 1.0 - 5.1 K/uL    Monocytes Absolute 0.4 0.0 - 1.3 K/uL    Eosinophils Absolute 0.2 0.0 - 0.6 K/uL    Basophils Absolute 0.0 0.0 - 0.2 K/uL         Therapy progress:  PT  Position Activity Restriction  Other position/activity restrictions: no activity restrictions noted  Objective     Sit to Stand: Contact guard assistance (at recliner/ EOB/ wc/ commode with RW, VC for hand placement)  Stand to Sit: Contact guard assistance (at recliner/ EOB/ wc/ commode with RW, VC for hand placement)  Device: Rolling Walker  Assistance: Contact guard assistance  Distance: 15', 200' (including ascent/descent of 15' ramp), 75', 10'  OT  PT Equipment Recommendations  Equipment Needed: No  Other: pt owns cane/RW/rollator  Toilet - Technique: Ambulating  Equipment Used: Grab bars  Toilet Transfers Comments: Pt pulling self up with 2WW  Assessment        SLP          Body mass index is 33.54 kg/m². Assessment and Plan:  1. Intracranial hemorrhage   - 9 mm left subdural hematoma with scattered foci of SAH 2/2 mechanical fall  - Keppra 500 mg BID for seizure ppx  - PT/OT  - SBP goal <160   - Neurology & NSGY following     2. Hypertension  - SBP goal <160  - PRN in place     3. Normocytic anemia 2/2 fall  - monitor     4.  Hypothyroidism   - Continue home Synthroid     - remove stitches 1/11.     - lower back pain  - tramadol prn  - heat  - bengay    Rehab Progress: Improving  Anticipated Dispo: home  Services/DME: [place]  ELOS: 1/13      AMBER LeosPMirandaH  PM&R  1/11/2023  10:31 AM

## 2023-01-12 PROCEDURE — 2580000003 HC RX 258: Performed by: PHYSICAL MEDICINE & REHABILITATION

## 2023-01-12 PROCEDURE — 6370000000 HC RX 637 (ALT 250 FOR IP): Performed by: PHYSICAL MEDICINE & REHABILITATION

## 2023-01-12 PROCEDURE — 97535 SELF CARE MNGMENT TRAINING: CPT

## 2023-01-12 PROCEDURE — 97530 THERAPEUTIC ACTIVITIES: CPT

## 2023-01-12 PROCEDURE — 99232 SBSQ HOSP IP/OBS MODERATE 35: CPT | Performed by: PHYSICAL MEDICINE & REHABILITATION

## 2023-01-12 PROCEDURE — 1280000000 HC REHAB R&B

## 2023-01-12 PROCEDURE — 97116 GAIT TRAINING THERAPY: CPT

## 2023-01-12 RX ADMIN — TRAMADOL HYDROCHLORIDE 25 MG: 50 TABLET, COATED ORAL at 19:43

## 2023-01-12 RX ADMIN — ACETAMINOPHEN 650 MG: 325 TABLET ORAL at 05:11

## 2023-01-12 RX ADMIN — ALLOPURINOL 100 MG: 100 TABLET ORAL at 09:11

## 2023-01-12 RX ADMIN — MONTELUKAST 10 MG: 10 TABLET, FILM COATED ORAL at 09:12

## 2023-01-12 RX ADMIN — AMIODARONE HYDROCHLORIDE 100 MG: 200 TABLET ORAL at 09:11

## 2023-01-12 RX ADMIN — SODIUM CHLORIDE, PRESERVATIVE FREE 10 ML: 5 INJECTION INTRAVENOUS at 21:30

## 2023-01-12 RX ADMIN — ATORVASTATIN CALCIUM 10 MG: 10 TABLET, FILM COATED ORAL at 09:12

## 2023-01-12 RX ADMIN — LEVETIRACETAM 500 MG: 500 TABLET, FILM COATED ORAL at 09:11

## 2023-01-12 RX ADMIN — ACETAMINOPHEN 650 MG: 325 TABLET ORAL at 00:51

## 2023-01-12 RX ADMIN — LEVOTHYROXINE SODIUM 75 MCG: 0.07 TABLET ORAL at 05:11

## 2023-01-12 RX ADMIN — SODIUM CHLORIDE, PRESERVATIVE FREE 5 ML: 5 INJECTION INTRAVENOUS at 11:59

## 2023-01-12 RX ADMIN — ACETAMINOPHEN 650 MG: 325 TABLET ORAL at 21:35

## 2023-01-12 RX ADMIN — LEVETIRACETAM 500 MG: 500 TABLET, FILM COATED ORAL at 21:30

## 2023-01-12 ASSESSMENT — PAIN DESCRIPTION - LOCATION
LOCATION: HEAD
LOCATION: HEAD
LOCATION: BUTTOCKS
LOCATION: BUTTOCKS

## 2023-01-12 ASSESSMENT — PAIN DESCRIPTION - ORIENTATION
ORIENTATION: RIGHT;LEFT
ORIENTATION: LEFT

## 2023-01-12 ASSESSMENT — PAIN SCALES - GENERAL
PAINLEVEL_OUTOF10: 7
PAINLEVEL_OUTOF10: 5
PAINLEVEL_OUTOF10: 0
PAINLEVEL_OUTOF10: 3
PAINLEVEL_OUTOF10: 3
PAINLEVEL_OUTOF10: 0

## 2023-01-12 ASSESSMENT — PAIN DESCRIPTION - DESCRIPTORS
DESCRIPTORS: ACHING;DISCOMFORT
DESCRIPTORS: TIGHTNESS
DESCRIPTORS: ACHING

## 2023-01-12 ASSESSMENT — PAIN SCALES - WONG BAKER
WONGBAKER_NUMERICALRESPONSE: 0
WONGBAKER_NUMERICALRESPONSE: 0

## 2023-01-12 ASSESSMENT — PAIN - FUNCTIONAL ASSESSMENT: PAIN_FUNCTIONAL_ASSESSMENT: ACTIVITIES ARE NOT PREVENTED

## 2023-01-12 NOTE — PROGRESS NOTES
Shift assessment complete. VSS, initial BP soft this shift, rechecked BP improved. A&Ox4. Denies pain at this time. Fall precautions in place. Patient up to chair for breakfast, chair alarm utilized, call light within reach. Patient with no new complaints at this time. Morning medications administered without complication.      Vitals:    01/12/23 1145   BP: 111/69   Pulse:    Resp:    Temp:    SpO2:

## 2023-01-12 NOTE — PROGRESS NOTES
Physician Progress Note      Jamie Mitchell  [facility] #:                  [id number]  :                       1935  ADMIT DATE:       2022 6:04 AM  DISCH DATE:        2023 6:15 PM  RESPONDING  PROVIDER #:        Ai Alexis MD          QUERY TEXT:    Pt admitted with traumatic SAH and has CHF is fully compensated documented per   1/ attending PN. If possible, please document in progress notes and   discharge summary further specificity regarding the type and acuity of CHF:    The medical record reflects the following: Last ECHO on 22 w/EF 55-60%. Risk Factors: 81 y.o. female with traumatic SAH following fall, HTN, a-fib,   asthma, anemia,  frequent UTI, Hx endometrial cancer  Clinical Indicators: per  PN: +2 RLE/LLE edema  Treatment: Home Demadex being held  Options provided:  -- Chronic Diastolic CHF/HFpEF  -- Other - I will add my own diagnosis  -- Disagree - Not applicable / Not valid  -- Disagree - Clinically unable to determine / Unknown  -- Refer to Clinical Documentation Reviewer    PROVIDER RESPONSE TEXT:    This patient has chronic diastolic CHF/HFpEF. Query created by: Lian Vang on 2023 4:00 PM      QUERY TEXT:    Pt admitted with Traumatic SAH w/LOC. Noted documentation of patient meeting   ASPEN criteria for moderate malnutrition per  Dietary consult assessment.     If possible, please document in progress notes and discharge summary:    The medical record reflects the following:  Risk Factors: per  Dietary: Inadequate energy intake related to altered   taste perception as evidenced by poor intake prior to admission, weight loss  Clinical Indicators: per  Dietary: Energy Intake:  75% or less estimated   energy requirements for 1 month or longer  Weight Loss:  Mild weight loss (specify amount and time period) (15% over 9   months)  Fluid Accumulation:  Mild  Treatment: Dietary consult, I&Os, wts  Options provided:  -- Moderate malnutrition confirmed  -- Moderate malnutrition ruled out  -- Other - I will add my own diagnosis  -- Disagree - Not applicable / Not valid  -- Disagree - Clinically unable to determine / Unknown  -- Refer to Clinical Documentation Reviewer    PROVIDER RESPONSE TEXT:    The diagnosis of moderate malnutrition was confirmed.     Query created by: Nano Callejas on 1/5/2023 3:49 PM      Electronically signed by:  Mark Brown MD 1/12/2023 1:53 PM

## 2023-01-12 NOTE — PROGRESS NOTES
Pt in bed, alert and oriented. VSS. 4/10 pain reported, medication given. . All safety measures are in place. Call light within reach. Will continue to monitor.     Vitals:    01/11/23 2107   BP: 131/61   Pulse: 59   Resp: 16   Temp: 98.3 °F (36.8 °C)   SpO2: 94%   '

## 2023-01-12 NOTE — PROGRESS NOTES
Comprehensive Nutrition Assessment    RECOMMENDATIONS:  PO Diet: Regular: Low Fat/Low Chol/High Fiber/2 gm Na  ONS: n/a  Nutrition Education: No recommendation at this time       NUTRITION ASSESSMENT:   Nutritional summary & status: Follow up. Pt is a Regular: Low Fat;Low Cholesterol/High Fiber/2 gm Na. Meal intakes 51-75%, and %. Pt reports fair appetite, however, stated that intake should improve when she gets home. Plans to discharge home tomorrow. Admission/PMH: Admit. Subdural hematoma  PMHx: COPD, HLD, HTN, OA, AF    MALNUTRITION ASSESSMENT  Context of Malnutrition: Chronic Illness   Malnutrition Status: Moderate malnutrition        NUTRITION DIAGNOSIS   Moderate malnutrition related to altered taste perception, inadequate protein-energy intake as evidenced by poor intake prior to admission, weight loss    Nutrition Monitoring and Evaluation:   Food/Nutrient Intake Outcomes:  Food and Nutrient Intake  Physical Signs/Symptoms Outcomes:  Biochemical Data, Weight, Meal Time Behavior     OBJECTIVE DATA: Significant to nutrition assessment  Nutrition Related Findings: LBM (1/11) Generalized Non-Pitting Edema, +1LLE/RLE  Wounds:  (wound upper left head)  Nutrition Goals: PO intake 75% or greater, prior to discharge     [facility] DIET; Regular; Low Fat/Low Chol/High Fiber/2 gm Na  PO Intake: 51-75%, %   PO Supplement Intake:None Ordered  Additional Sources of Calories/IVF:n/a     ANTHROPOMETRICS  Current Height: 5' (152.4 cm)  Current Weight: 173 lb 11.6 oz (78.8 kg)    Admission weight: 171 lb 1.2 oz (77.6 kg)  Ideal Body Weight (IBW): 100 lbs  (45 kg)    Usual Bodyweight     BMI: 34    COMPARATIVE STANDARDS  Energy (kcal):  2079-2191 (15-18 kcal/CBW/77.6 kg)     Protein (g):  62-78 (.8-1.0 gm/CBW/77.6 kg)       Fluid (mL/day):  1 ml/kcal or per MD    The patient will be monitored per nutrition standards of care.  Consult dietitian if additional nutrition interventions are needed prior to RD reassessment.      Myriam Guido, [address]:  [phone]  Office:  [phone]

## 2023-01-12 NOTE — PROGRESS NOTES
Occupational Therapy  Facility/Department: [facility] UNIT  Rehabilitation Occupational Therapy Daily Treatment Note/Discharge Summary     Date: 23  Patient Name: Kamar Arriaza       Room: 7261/3653-03  MRN: [id number]  Account: [de-identified]   : 1935  (81 y.o.) Gender: female                  Past Medical History:  has a past medical history of Asthma, Cancer ([address] History of blood transfusion, Hyperlipidemia, Hypertension, Leg swelling, Osteoarthritis, UTI symptoms, and Venous insufficiency of leg. Past Surgical History:   has a past surgical history that includes pr dilation & curettage dx&/ther nonobstetric; pr vaginal hysterectomy uterus 250 gm/< (); pr arthrp kne condyle&platu medial&lat compartments (Right, ); Appendectomy (); Carpal tunnel release; Hemorrhoid surgery; hernia repair; Nasal septum surgery; Tonsillectomy; Hysterectomy; joint replacement (Bilateral); Vein Surgery; and Capsule endoscopy (N/A, 2022). Restrictions  Other position/activity restrictions: no activity restrictions noted    Subjective  Subjective: Pt was asleep in the bed upon arrival. Pt was pleasant and agreeable to ADL w/ OT upon awakening. Objective     Cognition  Overall Cognitive Status: [facility]  Cognition Comment: Pt w/ min impulsivity and decreased safety awareness at times. Pt also w/ some poor recall and STM deficits  Orientation  Overall Orientation Status: Within Normal Limits         ADL    Feeding  Assistance Level: Independent  Skilled Clinical Factors: Breakfast tray delivered  Grooming/Oral Hygiene  Assistance Level: Supervision  Skilled Clinical Factors: Pt combed her hair and completed oral care in stance at the sink w/ spvn. Min VCs needed for RW safety  Upper Extremity Bathing  Assistance Level: Modified independent  Skilled Clinical Factors: Pt washed and dried 4/4 components of UE bathing seated on TTB.   Lower Extremity Bathing  Assistance Level: Supervision  Skilled Clinical Factors: Pt washed and dried 6/6 components of LE bathing seated on TTB and in stance at the GB w/ spvn. Pt used the LH sponge to wash BLEs seated. Pt required VCs to stay seated for safety during LE bathing  Upper Extremity Dressing  Assistance Level: Independent  Skilled Clinical Factors: Pt retrieved a shirt and bra from her bag and donned it I'ly while seated  Lower Extremity Dressing  Equipment Provided: Reachers  Assistance Level: Supervision  Skilled Clinical Factors: Pt threaded BLEs into underwear and pants w/ use of reacher seated on TTB w/ + time needed. Spvn in stance for clothig management over hips  Putting On/Taking Off Footwear  Assistance Level: Set-up; Verbal cues; Increased time to complete  Skilled Clinical Factors: Pt donned tenso shapes and crocs seated w/ + time needed to don both. Pt required use of sock aid to don tenso shapes. min VCs needed for technique but improvement noted today vs yesterday  Toileting  Assistance Level: Supervision  Skilled Clinical Factors: Pt completed pants management w/ spvn. Pt continent of urine and completed varun care  Toilet Transfers  Technique:  (ambulating)  Equipment: Standard toilet  Assistance Level: Supervision  Tub/Shower Transfers  Type: Tub  Transfer From: Walker  Transfer To: Tub transfer bench  Assistance Level: Supervision  Skilled Clinical Factors: min VCs needed for safe entry and exit    Functional Mobility  Device: Rolling walker  Activity: To/From bathroom  Assistance Level: Supervision  Skilled Clinical Factors: min VCs needed for safety and preventing abandonment of RW  Supine to Sit  Assistance Level: Modified independent  Stand to Sit  Assistance Level: Supervision  Bed To/From Chair  Assistance Level: Supervision         Assessment  Assessment  Assessment: Pt has demonstrated fair progress in OT since admission and has met 1/5 OT goals.  Pt remains somewhat limited by impaired balance and decreased safety awareness and continues to require spvn for performance w/ ADLs as pt requires VCs for safety. Pt is planning to d/c home tomorrow and pt denied any questions or concerns at this time. OT recommended 24 hr assistance initially upon d/c and HH OT. OT also recommended grab bars for the shower and a RW basket for safe item transport. Activity Tolerance: Patient tolerated treatment well  Discharge Recommendations: 24 hour supervision or assist;Home with Home health OT  OT Equipment Recommendations  Other: grab bars in shower, 2WW, hip kit, walker basket  Safety Devices  Safety Devices in place: Yes  Type of devices: Call light within reach; Left in chair;Chair alarm in place    Patient Education  Education  Education Provided: Mobility Training;Equipment;Transfer Training;Precautions; Safety;ADL Function  Education Provided Comments: use of sock aid and donning tenso shapes  Education Method: Demonstration;Verbal  Barriers to Learning: Cognition  Education Outcome: Verbalized understanding;Continued education needed    Plan  Occupational Therapy Plan  Times Per Week: 90 mins per day / 5x per week  Current Treatment Recommendations: Strengthening; Functional mobility training;Balance training; Endurance training;Neuromuscular re-education;Patient/Caregiver education & training; Safety education & training;Self-Care / ADL;Positioning      2nd session: Pt was seated in recliner chair upon arrival. Pt was pleasant and agreeable to OT. OT reviewed additional fall prevention education w/ pt this session to address safety w/ d/c home.  Fall prevention education included: discussion regarding removal of loose rugs, discussion regarding safety in narrow spaces since pt won't be able to use her RW in many locations in her home, recommendation for installation of GBs in the shower, pet safety and recommendations for feeding the dog from seated level, proper lighting outside the home during dark conditions, proper footwear and clothing to prevent tripping, discontinued use of foot stool in the home for reaching items, and kitchen safety and recommendations to re arrange kitchen for safe item retrieval. OT also provided pt w/ home hazards picture to determine if pt could identify unsafe conditions in a scenario. The pt identified  13/14 home hazards in the picture: stairs without handrails, deactivated fire alarm, cloth on the space heater, overloaded outlets, loose extension cords in traffic areas, cigarette left unattended, open medicine bottles, outdate medications, loose rugs, flip flop slippers, clutter on the staircase, and newspapers next to the light. Pt required min VCs to identify issue w/ the coffee pot overflowing. Based on pt's awareness of hazards pt has identified safety concerns and seems to have improved awareness. OT educated pt that it would still be best to have initial 24 hr assistance upon d/c to ensure safe transition home. Pt verb understanding. OT asked if pt had any additional concerns or questions and pt reported she did not have any issues at this time. Pt was left seated in the recliner chair w/ chair alarm on and call light within reach.                Goals  Patient Goals   Patient goals : \"to learn how to get up from a fall and to be independent so I can go home\"  Short Term Goals  Time Frame for Short Term Goals: STG=LTG  Long Term Goals  Time Frame for Long Term Goals : 10 days  Long Term Goal 1: Pt will perform oral hygiene with mod I-not met  Long Term Goal 2: Pt will perform toileting with mod I- not met  Long Term Goal 3: Pt will perform bathing routine with supervision using AE as needed (i.e long handled sponge)- goal met 1/12  Long Term Goal 4: Pt will perform LB dressing routine with AE as needed and mod I- not met  Long Term Goal 5: Pt will perform iADL task of laundry with AE as needed and supervision- not met    AM-PAC Score               Therapy Time   Individual Concurrent Group Co-treatment   Time In 0730         Time Out 0830         Minutes 60         Timed Code Treatment Minutes: 60 Minutes     Second Session Therapy Time:   Individual Concurrent Group Co-treatment   Time In  1100         Time Out  1130         Minutes  30           Timed Code Treatment Minutes:  30    Total Treatment Minutes:   60+30= 90   Jessica Dunlap OT

## 2023-01-12 NOTE — CARE COORDINATION
CM met with pt at bedside. Plans to dc home. Therapy encouraging pt to have daughter or son in law stay with her for a couple of days. Pt will need rolling walker for dc. Pt is agreeable to home care. [facility] accepted. 4:17 PM  BETY delivered walker to bedside.

## 2023-01-12 NOTE — PROGRESS NOTES
Department of Physical Medicine & Rehabilitation  Progress Note    Patient Identification:  Mandy Salinas  [id number]  : 1935  Admit date: 2023    Chief Complaint: SDH (subdural hematoma)    Subjective:   Plan for DC tomorrow. She would like her sutures removed before discharge. Improving in therapy daily. She is doing well with DC scores. Slept well last night. Improving with back pain. ROS: No f/c, n/v, cp     Objective:  Patient Vitals for the past 24 hrs:   BP Temp Temp src Pulse Resp SpO2 Weight   23 0909 (!) 95/54 97.8 °F (36.6 °C) Oral 53 16 95 % --   23 0445 -- -- -- -- -- -- 173 lb 11.6 oz (78.8 kg)   23 2143 -- -- -- -- 16 -- --   23 2107 131/61 98.3 °F (36.8 °C) Oral 59 16 94 % --       Const: Alert. No distress, pleasant. HEENT: Normocephalic, atraumatic. Normal sclera/conjunctiva. MMM. - healing ecchymosis   CV: Regular rate and rhythm. Resp: No respiratory distress. Lungs CTAB. Abd: Soft, nontender, nondistended, NABS+   Ext: No edema. Neuro: Alert, oriented, appropriately interactive. Psych: Cooperative, appropriate mood and affect    Laboratory data: Available via EMR. Last 24 hour lab  No results found for this or any previous visit (from the past 24 hour(s)).         Therapy progress:  PT  Position Activity Restriction  Other position/activity restrictions: no activity restrictions noted  Objective     Sit to Stand: Contact guard assistance (at recliner/ EOB/ wc/ commode with RW, VC for hand placement)  Stand to Sit: Contact guard assistance (at recliner/ EOB/ wc/ commode with RW, VC for hand placement)  Device: Rolling Walker  Assistance: Contact guard assistance  Distance: 15', 200' (including ascent/descent of 15' ramp), 75', 10'  OT  PT Equipment Recommendations  Equipment Needed: Yes  Mobility Devices: Catheryn Dimes: Rolling  Other: pt owns cane/RW/rollator; would benefit from additional RW to keep in bedroom at home due to narrow hallway/doorway  Toilet - Technique: Ambulating  Equipment Used: Grab bars  Toilet Transfers Comments: Pt pulling self up with 2WW  Assessment        SLP          Body mass index is 33.93 kg/m². Assessment and Plan:  1. Intracranial hemorrhage   - 9 mm left subdural hematoma with scattered foci of SAH 2/2 mechanical fall  - Keppra 500 mg BID for seizure ppx  - PT/OT  - SBP goal <160   - Neurology & NSGY following     2. Hypertension  - SBP goal <160  - PRN in place     3. Normocytic anemia 2/2 fall  - monitor     4.  Hypothyroidism   - Continue home Synthroid       - lower back pain  - tramadol prn  - heat  - bengay    Rehab Progress: Improving  Anticipated Dispo: home  Services/DME: [place]  ELOS: 1/13      Yann Dai D.O. M.P.H  PM&R  1/12/2023  10:30 AM

## 2023-01-12 NOTE — PROGRESS NOTES
Physical Therapy  Facility/Department: [facility] UNIT  Rehabilitation Physical Therapy Treatment Note    NAME: Cayetano Guerrier  : 1935 (81 y.o.)  MRN: [id number]  CODE STATUS: Full Code    Date of Service: 23    Restrictions:  Position Activity Restriction  Other position/activity restrictions: no activity restrictions noted     SUBJECTIVE  Subjective  Subjective: Pt seated in recliner alert and agreeable to session . Pain: Denies pain    OBJECTIVE  Cognition  Overall Cognitive Status: WFL  Cognition Comment: Pt w/ min impulsivity and decreased safety awareness at times. Pt also w/ some poor recall and STM deficits  Orientation  Overall Orientation Status: Within Normal Limits    Functional Mobility  Balance  Sitting Balance: Modified independent  (seated on commode)  Standing Balance: Supervision (supv for brief/pants management/ hand washing without UE support)  Standing Balance  Activity: attempts to have BM on commode however is unsucessful  Transfers  Surface: To chair with arms;From chair with arms  Sit to Stand  Assistance Level: Independent  Stand to Sit  Assistance Level: Independent  Skilled Clinical Factors: demo's increased RW management with decreased safety cues required  Car Transfer  Assistance Level: Supervision  Skilled Clinical Factors: VC for safe hand placement      Environmental Mobility  Ambulation  Surface: Level surface  Device: Rolling walker  Distance: 15', 500' x 2  Activity: Within Room; Within Unit  Activity Comments: no LOB noted; prolonged rest breaks required between ambulations  Additional Factors: Verbal cues  Assistance Level: Supervision;Modified independent (supv>>mod I for distances ambuakted in hallway, supervision required for navigation of tight soaces due to periods of unsafe RW positioning)  Gait Deviations: Slow jennifer;Decreased step length bilateral;Decreased heel strike right  Skilled Clinical Factors: Safety cues for safe positioning of RW equired when navigating tight spaces in bathroom with RW which pt will have at home in mobile home. VC for upright posture and increased step height. Demonstrates improved RW management and safe placement when preparing to sit in chair this date. Second Session : Pt seated in recliner alert and agree able to session . No pain at this time, ot reports needs to use bathroom. Pt performed sit <> stand from recliner, ETS, chair, and EOB with mod I and RW. Pt with no LOB and no cues needed. Pt able to stand at [facility] for varun care and stand at sink to wash hands with mod I and RW. No LOB. Pt ambulated 1x 200 ft , 1x 140ft, 1 x 60ft, and 1 x 75 ft ascend and descend ramp with supervision and RW. Cues for posture and step height. No LOB. Pt ascend and descend 12 steps with supervision and bilateral rail. Cues for sequencing. Pt then performed item retrieval with RW and reacher including picking up objects from ground with mod I . No LOB. Pt then performed ambulation in ghosh with rail to simulate home hallway with ambulated with mod I - supervision. No LOB. Pt then performed ambulation around obstacles and small narrow spaces to simulate ambulation in home. Pt performed 3x ~ 25 ft with RW and supervision. Discussed home dc with pt . Pt is asking daughter to stay with her but not confirmed yet. Pt understands that PT recommends 24 hr assist at OR . Pt plans to use RW at all times except hallway . Pt has narrow hallway with rails on both sides to use. Pt then has second RW in  bedroom to use there. HHPT at OR as well. Pt agreeable to dc plan. Pt then supine <> sit on flat bed with no rails and mod I. Reviewed HEP supine with use of handout. Pt has handout already. Pt performed 1x10 BLE bridges, hip abd, hip add, SLR, SAQ , heel slides , and ankle pumps. Pt left seated in chair with alarm on , call light in reach , and all needs in reach.      ASSESSMENT/PROGRESS TOWARDS GOALS  Assessment  Assessment: Pt meets goals to compelte bed mobility and transfers with independence-mod I. Pt also demosntrates ability to meet goal to ambualte 150' with RW with mod I in hallway however requires supervision in tight spaces and with rail in hallway which she will have to do in mobile home at discharge. Pt requires increased time to  meet goals to compelte ramp and steps with mod I and currently requires supervision for these tasks. Pt would benefit from continued PT inorder to maximize functional mobility and independence. At discharge pt would benefit from initial 24/ 7 and HHPT. Activity Tolerance: Patient tolerated treatment well  Discharge Recommendations: 24 hour supervision or assist;Home with Home health PT  PT Equipment Recommendations  Equipment Needed: Yes  Mobility Devices: Radha Mimes: Rolling  Other: pt owns cane/RW/rollator; would benefit from additional RW to keep in bedroom at home due to narrow hallway/doorway    Goals  Short Term Goals  Time Frame for Short Term Goals: 10 days-  Short Term Goal 1: pt will perform bed mobility independently- met 1/12  Short Term Goal 2: pt will perform sit<>stand with LRAD and mod I- met 1/12  Short Term Goal 3: pt will ambulate 150' with LRAD and mod I- met 1/12  Short Term Goal 4: Pt will complete ascent/descent of 10' ramp with LRAD and mod I- ongoing  Short Term Goal 5: Pt will complete ascent/descent of 4 steps with B HR and mod I - ongoing    PLAN OF CARE/SAFETY  Physcial Therapy Plan  Days Per Week: 5 Days  Hours Per Day: 1.5 hours  Therapy Duration: 10 Days  Current Treatment Recommendations: Strengthening;Balance training;Gait training;Functional mobility training;Transfer training; Endurance training;Patient/Caregiver education & training; Therapeutic activities; Safety education & training;Home exercise program;Stair training;Equipment evaluation, education, & procurement  Safety Devices  Type of Devices: Call light within reach;Gait belt; Chair alarm in place; Left in chair; All fall risk precautions in place  Restraints  Restraints Initially in Place: No    EDUCATION  Education  Education Given To: Patient  Education Provided: Role of Therapy; Safety; Mobility Training;Energy Conservation  Education Provided Comments: benefits of balance exercises  Education Method: Verbal  Barriers to Learning: Cognition  Education Outcome: Verbalized understanding;Continued education needed        Therapy Time   Individual Concurrent Group Co-treatment   Time In 1000         Time Out 1030         Minutes Toni [address] [place] [address] [id number]      Second Session documentation only Seamus Buck , PT, DPT   Second Session Therapy Time:   Individual Concurrent Group Co-treatment   Time In  7126         Time Out  1415         Minutes  60             Total Treatment Minutes:  60

## 2023-01-12 NOTE — PLAN OF CARE
Problem: Discharge Planning  Goal: Discharge to home or other facility with appropriate resources  Outcome: Progressing  Flowsheets (Taken 1/12/2023 0909)  Discharge to home or other facility with appropriate resources: Identify barriers to discharge with patient and caregiver     Problem: Pain  Goal: Verbalizes/displays adequate comfort level or baseline comfort level  Outcome: Progressing  Flowsheets (Taken 1/12/2023 0909)  Verbalizes/displays adequate comfort level or baseline comfort level:   Encourage patient to monitor pain and request assistance   Assess pain using appropriate pain scale   Administer analgesics based on type and severity of pain and evaluate response   Implement non-pharmacological measures as appropriate and evaluate response     Problem: Skin/Tissue Integrity  Goal: Absence of new skin breakdown  Description: 1. Monitor for areas of redness and/or skin breakdown  2. Assess vascular access sites hourly  3. Every 4-6 hours minimum:  Change oxygen saturation probe site  4. Every 4-6 hours:  If on nasal continuous positive airway pressure, respiratory therapy assess nares and determine need for appliance change or resting period.   Outcome: Progressing     Problem: Safety - Adult  Goal: Free from fall injury  Outcome: Progressing     Problem: ABCDS Injury Assessment  Goal: Absence of physical injury  Outcome: Progressing     Problem: Nutrition Deficit:  Goal: Optimize nutritional status  Outcome: Progressing

## 2023-01-13 VITALS
TEMPERATURE: 98 F | OXYGEN SATURATION: 98 % | BODY MASS INDEX: 33.93 KG/M2 | RESPIRATION RATE: 17 BRPM | HEIGHT: 60 IN | WEIGHT: 172.84 LBS | HEART RATE: 54 BPM | SYSTOLIC BLOOD PRESSURE: 115 MMHG | DIASTOLIC BLOOD PRESSURE: 75 MMHG

## 2023-01-13 LAB
ANION GAP SERPL CALCULATED.3IONS-SCNC: 8 MMOL/L (ref 3–16)
BASOPHILS ABSOLUTE: 0.1 K/UL (ref 0–0.2)
BASOPHILS RELATIVE PERCENT: 1.5 %
BUN BLDV-MCNC: 12 MG/DL (ref 7–20)
CALCIUM SERPL-MCNC: 8.6 MG/DL (ref 8.3–10.6)
CHLORIDE BLD-SCNC: 102 MMOL/L (ref 99–110)
CO2: 26 MMOL/L (ref 21–32)
CREAT SERPL-MCNC: 0.8 MG/DL (ref 0.6–1.2)
EOSINOPHILS ABSOLUTE: 0.2 K/UL (ref 0–0.6)
EOSINOPHILS RELATIVE PERCENT: 6 %
GFR SERPL CREATININE-BSD FRML MDRD: >60 ML/MIN/{1.73_M2}
GLUCOSE BLD-MCNC: 82 MG/DL (ref 70–99)
HCT VFR BLD CALC: 26.1 % (ref 36–48)
HEMOGLOBIN: 8.2 G/DL (ref 12–16)
LYMPHOCYTES ABSOLUTE: 0.9 K/UL (ref 1–5.1)
LYMPHOCYTES RELATIVE PERCENT: 26.6 %
MCH RBC QN AUTO: 29.1 PG (ref 26–34)
MCHC RBC AUTO-ENTMCNC: 31.4 G/DL (ref 31–36)
MCV RBC AUTO: 92.6 FL (ref 80–100)
MONOCYTES ABSOLUTE: 0.4 K/UL (ref 0–1.3)
MONOCYTES RELATIVE PERCENT: 10.7 %
NEUTROPHILS ABSOLUTE: 1.9 K/UL (ref 1.7–7.7)
NEUTROPHILS RELATIVE PERCENT: 55.2 %
PDW BLD-RTO: 17.2 % (ref 12.4–15.4)
PLATELET # BLD: 205 K/UL (ref 135–450)
PMV BLD AUTO: 9.1 FL (ref 5–10.5)
POTASSIUM REFLEX MAGNESIUM: 4.3 MMOL/L (ref 3.5–5.1)
RBC # BLD: 2.82 M/UL (ref 4–5.2)
SODIUM BLD-SCNC: 136 MMOL/L (ref 136–145)
WBC # BLD: 3.5 K/UL (ref 4–11)

## 2023-01-13 PROCEDURE — 80048 BASIC METABOLIC PNL TOTAL CA: CPT

## 2023-01-13 PROCEDURE — 85025 COMPLETE CBC W/AUTO DIFF WBC: CPT

## 2023-01-13 PROCEDURE — 36415 COLL VENOUS BLD VENIPUNCTURE: CPT

## 2023-01-13 PROCEDURE — 6370000000 HC RX 637 (ALT 250 FOR IP): Performed by: PHYSICAL MEDICINE & REHABILITATION

## 2023-01-13 PROCEDURE — 99239 HOSP IP/OBS DSCHRG MGMT >30: CPT | Performed by: PHYSICAL MEDICINE & REHABILITATION

## 2023-01-13 RX ORDER — AMIODARONE HYDROCHLORIDE 100 MG/1
100 TABLET ORAL DAILY
Qty: 60 TABLET | Refills: 0 | Status: SHIPPED | OUTPATIENT
Start: 2023-01-13

## 2023-01-13 RX ORDER — TRAMADOL HYDROCHLORIDE 50 MG/1
25 TABLET ORAL EVERY 4 HOURS PRN
Qty: 20 TABLET | Refills: 0 | Status: SHIPPED | OUTPATIENT
Start: 2023-01-13 | End: 2023-01-16

## 2023-01-13 RX ORDER — POLYETHYLENE GLYCOL 3350 17 G/17G
17 POWDER, FOR SOLUTION ORAL DAILY PRN
Qty: 527 G | Refills: 1 | Status: SHIPPED | OUTPATIENT
Start: 2023-01-13 | End: 2023-02-12

## 2023-01-13 RX ORDER — ATORVASTATIN CALCIUM 10 MG/1
10 TABLET, FILM COATED ORAL DAILY
Qty: 30 TABLET | Refills: 3 | Status: SHIPPED | OUTPATIENT
Start: 2023-01-13

## 2023-01-13 RX ORDER — LEVETIRACETAM 500 MG/1
500 TABLET ORAL 2 TIMES DAILY
Qty: 60 TABLET | Refills: 3 | Status: SHIPPED | OUTPATIENT
Start: 2023-01-13

## 2023-01-13 RX ORDER — MENTHOL 1.4 %
ADHESIVE PATCH, MEDICATED TOPICAL 3 TIMES DAILY PRN
Qty: 1 EACH | Refills: 1 | Status: SHIPPED | OUTPATIENT
Start: 2023-01-13

## 2023-01-13 RX ADMIN — LEVOTHYROXINE SODIUM 75 MCG: 0.07 TABLET ORAL at 05:55

## 2023-01-13 RX ADMIN — MONTELUKAST 10 MG: 10 TABLET, FILM COATED ORAL at 09:42

## 2023-01-13 RX ADMIN — LEVETIRACETAM 500 MG: 500 TABLET, FILM COATED ORAL at 09:41

## 2023-01-13 RX ADMIN — ATORVASTATIN CALCIUM 10 MG: 10 TABLET, FILM COATED ORAL at 09:42

## 2023-01-13 RX ADMIN — TRAMADOL HYDROCHLORIDE 25 MG: 50 TABLET, COATED ORAL at 05:55

## 2023-01-13 RX ADMIN — TRAMADOL HYDROCHLORIDE 25 MG: 50 TABLET, COATED ORAL at 00:23

## 2023-01-13 RX ADMIN — ALLOPURINOL 100 MG: 100 TABLET ORAL at 09:42

## 2023-01-13 RX ADMIN — AMIODARONE HYDROCHLORIDE 100 MG: 200 TABLET ORAL at 09:42

## 2023-01-13 ASSESSMENT — PAIN DESCRIPTION - LOCATION: LOCATION: BUTTOCKS

## 2023-01-13 ASSESSMENT — PAIN - FUNCTIONAL ASSESSMENT: PAIN_FUNCTIONAL_ASSESSMENT: ACTIVITIES ARE NOT PREVENTED

## 2023-01-13 ASSESSMENT — PAIN SCALES - GENERAL
PAINLEVEL_OUTOF10: 2
PAINLEVEL_OUTOF10: 4
PAINLEVEL_OUTOF10: 4

## 2023-01-13 ASSESSMENT — PAIN DESCRIPTION - DESCRIPTORS: DESCRIPTORS: ACHING

## 2023-01-13 ASSESSMENT — PAIN SCALES - WONG BAKER: WONGBAKER_NUMERICALRESPONSE: 0

## 2023-01-13 ASSESSMENT — PAIN DESCRIPTION - PAIN TYPE: TYPE: NEUROPATHIC PAIN

## 2023-01-13 ASSESSMENT — PAIN DESCRIPTION - ORIENTATION: ORIENTATION: RIGHT;LEFT

## 2023-01-13 NOTE — PLAN OF CARE
Problem: Discharge Planning  Goal: Discharge to home or other facility with appropriate resources  1/13/2023 0037 by Iliana Aviles RN  Outcome: Progressing     Problem: Pain  Goal: Verbalizes/displays adequate comfort level or baseline comfort level  1/13/2023 0037 by Iliana Aviles RN  Outcome: Progressing     Problem: Skin/Tissue Integrity  Goal: Absence of new skin breakdown  Description: 1. Monitor for areas of redness and/or skin breakdown  2. Assess vascular access sites hourly  3. Every 4-6 hours minimum:  Change oxygen saturation probe site  4. Every 4-6 hours:  If on nasal continuous positive airway pressure, respiratory therapy assess nares and determine need for appliance change or resting period.   1/13/2023 0037 by Iliana Aviles RN  Outcome: Progressing     Problem: Safety - Adult  Goal: Free from fall injury  1/13/2023 0037 by Iliana Aviles RN  Outcome: Progressing     Problem: ABCDS Injury Assessment  Goal: Absence of physical injury  1/13/2023 0037 by Iliana Aviles RN  Outcome: Progressing

## 2023-01-13 NOTE — DISCHARGE INSTR - COC
Continuity of Care Form    Patient Name: Elenita Jefferson   :  1935  MRN:  [id number]    Admit date:  2023  Discharge date:  2023    Code Status Order: Full Code   Advance Directives:     Admitting Physician:  Yao Chavez DO  PCP: Oly Fu    Discharging Nurse: [facility] Unit/Room#: 9394/0045-28  Discharging Unit Phone Number: [phone]    Emergency Contact:   Extended Emergency Contact Information  Primary Emergency Contact: Martha [place] [address] Phone: [phone]  Mobile Phone: [phone]  Relation: Child  Secondary Emergency Contact: Shayna Richey  Address: [address] [place] [address], [address] [place] [address] Phone: [phone]  Mobile Phone: [phone]  Relation: Child    Past Surgical History:  Past Surgical History:   Procedure Laterality Date    APPENDECTOMY  1969    CAPSULE ENDOSCOPY N/A 2022    CAPSULE ENDOSCOPY performed by Mickey Daniel MD at [address] [place] [address] (CERVIX STATUS UNKNOWN)      JOINT REPLACEMENT Bilateral     bilateral TKR    NASAL SEPTUM SURGERY      ND ARTHRP KNE CONDYLE&PLATU MEDIAL&LAT COMPARTMENTS Right     knee surgery    ND DILATION & CURETTAGE DX&/THER NONOBSTETRIC      D & C    ND VAGINAL HYSTERECTOMY UTERUS 250 GM/<  2004    Hysterectomy    TONSILLECTOMY      VEIN SURGERY      legs       Immunization History: There is no immunization history on file for this patient.     Active Problems:  Patient Active Problem List   Diagnosis Code    Vaginal atrophy N95.2    History of endometrial cancer Z85.42    Frequent UTI N39.0    H/O total hysterectomy with bilateral salpingo-oophorectomy (BSO) Z90.710, Z90.722, Z90.79    Acute intra-cranial hemorrhage (HCC) I62.9    Intracranial hemorrhage (HCC) I62.9    KYARA (acute kidney injury) ([facility] [address] [address] N17.9    Normocytic anemia D64.9    Hypertension I10 Hyperlipidemia E78.5    COPD (chronic obstructive pulmonary disease) ([facility]) J44.9    SDH (subdural hematoma) S06. 5XAA    Malnutrition ([address] E46       Isolation/Infection:   Isolation            No Isolation          Patient Infection Status       Infection Onset Added Last Indicated Last Indicated By Review Planned Expiration Resolved Resolved By    None active    Resolved    COVID-19 (Rule Out) 12/30/22 12/30/22 12/30/22 COVID-19, Rapid (Ordered)   12/30/22 Rule-Out Test Resulted            Nurse Assessment:  Last Vital Signs: /75   Pulse 54   Temp 98 °F (36.7 °C) (Oral)   Resp 17   Ht 5' (1.524 m)   Wt 172 lb 13.5 oz (78.4 kg)   SpO2 98%   BMI 33.76 kg/m²     Last documented pain score (0-10 scale): Pain Level: 2  Last Weight:   Wt Readings from Last 1 Encounters:   01/13/23 172 lb 13.5 oz (78.4 kg)     Mental Status:  oriented and alert    IV Access:  - None    Nursing Mobility/ADLs:  Walking   Assisted  Transfer  Independent  Bathing  Assisted  Dressing  Independent  Toileting  Independent  Feeding  Independent  Med Admin  Assisted  Med Delivery   whole    Wound Care Documentation and Therapy:  Wound 12/30/22 Head Left;Upper swollen forehead hematoma with old bloody drainage and a piece of tape drsg on the bleeding site (Active)   Wound Etiology Traumatic 01/10/23 2015   Dressing Status Intact; Old drainage noted 01/06/23 0819   Wound Cleansed Soap and water 01/10/23 0846   Dressing/Treatment Open to air 01/13/23 0023   Wound Assessment Superficial 01/10/23 0846   Drainage Amount None 01/13/23 0023   Odor None 01/10/23 2015   Mayra-wound Assessment Ecchymosis 01/12/23 2135   Margins Attached edges 01/12/23 2135   Number of days: 14        Elimination:  Continence:    Bowel: Yes  Bladder: Yes  Urinary Catheter: None   Colostomy/Ileostomy/Ileal Conduit: No       Date of Last BM: 1/12/23    Intake/Output Summary (Last 24 hours) at 1/13/2023 0934  Last data filed at 1/13/2023 0921  Gross per 24 hour Intake 480 ml   Output --   Net 480 ml     I/O last 3 completed shifts: In: 600 [P.O.:600]  Out: -     Safety Concerns: At Risk for Falls    Impairments/Disabilities:      Vision    Nutrition Therapy:  Current Nutrition Therapy:   - Oral Diet:  General and Low Fat    Routes of Feeding: Oral  Liquids: Thin Liquids  Daily Fluid Restriction: no  Last Modified Barium Swallow with Video (Video Swallowing Test): not done    Treatments at the Time of Hospital Discharge:   Respiratory Treatments: n/a  Oxygen Therapy:  is not on home oxygen therapy. Ventilator:    - No ventilator support    Rehab Therapies: Physical Therapy, Occupational Therapy, and SN, MSW  Weight Bearing Status/Restrictions: No weight bearing restrictions  Other Medical Equipment (for information only, NOT a DME order):  walker  Other Treatments: n/a    Patient's personal belongings (please select all that are sent with patient):  Glasses, personal belongings sent with patient     RN SIGNATURE:  Electronically signed by Jac Canas RN on 1/13/23 at 1:17 PM EST    CASE MANAGEMENT/SOCIAL WORK SECTION    Inpatient Status Date:     Readmission Risk Assessment Score:  Readmission Risk              Risk of Unplanned Readmission:  13           Discharging to Facility/ Agency   Name: [facility]   Address:  Phone: [phone]  Fax:    Dialysis Facility (if applicable)   Name:  Address:  Dialysis Schedule:  Phone:  Fax:    / signature: Electronically signed by ARYAN Paige on 1/13/23 at 9:40 AM EST    PHYSICIAN SECTION    Prognosis: Good    Condition at Discharge: Stable    Rehab Potential (if transferring to Rehab): Good    Recommended Labs or Other Treatments After Discharge: PT/OT/RN    Physician Certification: I certify the above information and transfer of Davian Britt  is necessary for the continuing treatment of the diagnosis listed and that she requires Home Care for greater 30 days. Update Admission H&P: No change in H&P    PHYSICIAN SIGNATURE:  Electronically signed by Darrell Herrera DO on 1/13/23 at 9:36 AM EST

## 2023-01-13 NOTE — DISCHARGE SUMMARY
Physical Medicine & Rehabilitation  Discharge Summary     Patient Identification:  Chrissy Boyce  : 1935  Admit date: 2023  Discharge date:  23  Attending provider: Damian Culp DO        Primary care provider: Rodolfo Nelson     Discharge Diagnoses:   Patient Active Problem List   Diagnosis    Vaginal atrophy    History of endometrial cancer    Frequent UTI    H/O total hysterectomy with bilateral salpingo-oophorectomy (BSO)    Acute intra-cranial hemorrhage (HCC)    Intracranial hemorrhage (HCC)    KYARA (acute kidney injury) ([facility] [address]    Normocytic anemia    Hypertension    Hyperlipidemia    COPD (chronic obstructive pulmonary disease) (HCC)    SDH (subdural hematoma)    Malnutrition (HCC)       History of Present Illness/Acute Hospital Course:  Lauren Barbour is a 80-year-old female with PMHx of Afib (s/p watchman device), COPD, HTN and multiple AVM who came in with multiple falls in the past week. She was putting groceries in the car and the next thing she remembers is sitting in the chair. She was told that she fell and hit her head on the pole of a handicap sign. 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 Neurosurgery was consulted and was transferred to [facility] ICU. Inpatient Rehabilitation Course:   Chrissy Boyce is a 81 y.o. female admitted to inpatient rehabilitation on 2023 with SDH (subdural hematoma). The patient participated in an aggressive multidisciplinary inpatient rehabilitation program involving 3 hours of therapy per day, at least 5 days per week. Impairments: Decreased functional mobility     Medical Management:    1. Intracranial hemorrhage   - 9 mm left subdural hematoma with scattered foci of SAH 2/2 mechanical fall  - Keppra 500 mg BID for seizure ppx  - Neurochecks Q4H  - PT/OT  - SBP goal <160   - Neurology & NSGY following   2. Hypertension  - SBP goal <160  - PRN in place   3. Normocytic anemia 2/2 fall  - Hgb 7.6 with 9.2 on admission; continue to monitor   4. Hypothyroidism   - Continue home Synthroid       Discharge Exam:  Constitutional: Alert, WDWN, Pleasant, no distress  Head: Normocephalic, atruamatic, MMM  Eyes: Conjunctiva noninjected, no icterus, no drainage  Pulm: CTA bilat. Respirations non-labored. CV: No murmurs noted. RRR. Abd: Soft, nontender. NABS+  Ext: No edema, no varicosities  Neuro: Alert, fully oriented, appropriate   MSK: No joint abnormalities noted       Discharge Functional Status:    Physical therapy:  Bed Mobility: Scooting: Stand by assistance (seated EOB)  Transfers: Sit to Stand: Contact guard assistance (at recliner/ EOB/ wc/ commode with RW, VC for hand placement)  Stand to Sit: Contact guard assistance (at recliner/ EOB/ wc/ commode with RW, VC for hand placement), Ambulation  Surface: Level tile  Device: Rolling Walker  Assistance: Contact guard assistance  Quality of Gait: slight lateral sway with each step, 1 L LOB which she is able to correct with CGA; tends to abandon walker when gets close to chair to sit/goes to sink to wash hands  Gait Deviations: Slow Sandra, Decreased step length, Decreased step height  Distance: 15', 200' (including ascent/descent of 15' ramp), 75', 10', Stairs  # Steps : 12  Rails: Bilateral  Assistance: Contact guard assistance  Comment: VC for RW management and upright posture, VC to keep both hands on RW instead of reaching for HR with 1 hand on compeltion of ramp  Mobility:  , PT Equipment Recommendations  Equipment Needed: Yes  Mobility Devices: Charan Bhardwaj: Rolling  Other: pt owns cane/RW/rollator; would benefit from additional RW to keep in bedroom at home due to narrow hallway/doorway, Assessment: Pt presents to [facility] hospital s/p multiple falls at home with most recent fall resulting in [address]. Pt currently requires CGA for transfers and ambualtion with safety cues required.  Normally home alone with intermittant use of devices - reports 1 fall occured when she was not using device. Pt would benefit from continued skilled PT in order to improve functiona lmobility and independence for safer discharge home. Occupational therapy:  ,  , Assessment: Pt admit to ARU for fall with SDH. Reporting feeling LH this date. Completing mobility  with RW and CGA in room, bathroom, toileting, stance at sink for grooming, and with mobility in hallway. Pt unsteady at times however no LOB. Pt with standing rest breaks throughout session 2/2 SOB and fatigue. Pt completing mobility ~250' in hallways. Pt standing at sink for oral care, comb hair. Seated in recliner chair for UE/LE exercise. Setup with lunch end of session. Pt cont to require increased cues for RW management, hand placement wiht transfers. Pt is from home and normally IND with mobility, transfers, and ADL. Would benefit from onging intensive inpt therapy to rtn to baseline status. Will cont POC.     Speech therapy:         Significant Diagnostics:   Lab Results   Component Value Date    CREATININE 0.8 01/13/2023    BUN 12 01/13/2023     01/13/2023    K 4.3 01/13/2023     01/13/2023    CO2 26 01/13/2023       Lab Results   Component Value Date    WBC 3.5 (L) 01/13/2023    HGB 8.2 (L) 01/13/2023    HCT 26.1 (L) 01/13/2023    MCV 92.6 01/13/2023     01/13/2023       Disposition:  home    Services:PT/OT  DME: walker     Discharge Condition: Stable    Follow-up:  See after visit summary from hospitalization    Discharge Medications:     Medication List        START taking these medications      atorvastatin 10 MG tablet  Commonly known as: LIPITOR  Take 1 tablet by mouth daily  Replaces: simvastatin 20 MG tablet     levETIRAcetam 500 MG tablet  Commonly known as: KEPPRA  Take 1 tablet by mouth 2 times daily     methyl salicylate-menthol 10-24 % Crea  Apply topically 3 times daily as needed for Pain     polyethylene glycol 17 g packet  Commonly known Brendaer Kiss  Take 17 g by mouth daily as needed for Constipation     traMADol 50 MG tablet  Commonly known as: ULTRAM  Take 0.5 tablets by mouth every 4 hours as needed for Pain for up to 3 days. Max Daily Amount: 150 mg            CONTINUE taking these medications      allopurinol 100 MG tablet  Commonly known as: ZYLOPRIM     amiodarone 100 MG tablet  Commonly known as: PACERONE  Take 1 tablet by mouth daily     budesonide 0.5 MG/2ML nebulizer suspension  Commonly known as: PULMICORT  Take 2 mLs by nebulization in the morning and 2 mLs in the evening. estradiol 0.1 MG/GM vaginal cream  Commonly known as: ESTRACE     levothyroxine 75 MCG tablet  Commonly known as: SYNTHROID     montelukast 10 MG tablet  Commonly known as: SINGULAIR     vitamin D 50 MCG (2000 UT) Caps capsule            STOP taking these medications      amitriptyline 50 MG tablet  Commonly known as: ELAVIL     simvastatin 20 MG tablet  Commonly known as: ZOCOR  Replaced by: atorvastatin 10 MG tablet               Where to Get Your Medications        These medications were sent to [place] [address], [address] [place] [address]. [facility] [phone] Giuliana Burch [address] [place] [address]      Phone: [phone]   amiodarone 100 MG tablet  atorvastatin 10 MG tablet  levETIRAcetam 500 MG tablet  methyl salicylate-menthol 41-17 % Crea  polyethylene glycol 17 g packet  traMADol 50 MG tablet           I spent over 35 minutes on this discharge encounter between counseling, coordination of care, and medication reconciliation. To comply with [facility] bebeto PAPPASII.4.1:   Discharge order placed in advance to facilitate patients discharge needs.       Floyd Duran DO

## 2023-01-13 NOTE — PROGRESS NOTES
Pt in bed, alert and oriented. VSS. 4/10 pain reported, Ultram given. All safety measures are in place. Call light within reach. Will continue to monitor.     Vitals:    01/13/23 0023   BP: 115/75   Pulse: 54   Resp: 16   Temp: 98 °F (36.7 °C)   SpO2: 98%

## 2023-01-13 NOTE — PROGRESS NOTES
Assessment complete, see flow sheet. Pt c/o buttocks pain, Tylenol given per MD order, see MAR. Pt then repositioned with pillows for comfort, and heat packs applied also for pain control/ MD order. The pt currently has no other needs at this time, call light in reach and bed alarm on. The pt states her being repositioned seems to help her pain. I will continue to follow throughout my shift.   Jasmin Malone RN

## 2023-01-13 NOTE — CARE COORDINATION
[facility]    Patient aware and agreeable to services.  Faxed orders to [facility] for [facility] by 1/15    Navin Kong LPN  Care Transition Nurse  [address]  [phone]

## 2023-01-13 NOTE — CARE COORDINATION
Case Management Assessment            Discharge Note                    Date / Time of Note: 1/13/2023 9:59 AM                  Discharge Note Completed by: ARYAN Gilman    Patient Name: Gricelda Samuel   YOB: 1935  Diagnosis: SDH (subdural hematoma) [S06. 5XAA]   Date / Time: 1/5/2023  6:15 PM    Current PCP: Sam Elias patient: No    Hospitalization in the last 30 days: No    Advance Directives:  Code Status: Full Code  [place] DNR form completed and on chart: No    Financial:  Payor: Marla Moon / Plan: Jeanette Briones PPO / Product Type: Medicare /      Pharmacy:    [facility] [id number], [facility] [address] [facility] [phone]  [address] [place] [address] Dr Charly Cantu  Phone: [phone] Fax: [phone]    [facility] 6, [address] [facility]. Karma Collier [phone] - F [address] [place] [address]  Phone: [phone] Fax: [phone]      Assistance purchasing medications?:    Assistance provided by Case Management: None at this time    Does patient want to participate in local refill/ meds to beds program?:      Meds To Beds General Rules:  1. Can ONLY be done Monday- Friday between 8:30am-5pm  2. Prescription(s) must be in pharmacy by 3pm to be filled same day  3. Copy of patient's insurance/ prescription drug card and patient face sheet must be sent along with the prescription(s)  4. Cost of Rx cannot be added to hospital bill. If financial assistance is needed, please contact unit  or ;  or  CANNOT provide pharmacy voucher for patients co-pays  5.  Patients can then  the prescription on their way out of the hospital at discharge, or pharmacy can deliver to the bedside if staff is available. (payment due at time of pick-up or delivery - cash, check, or card accepted)     Able to afford home medications/ co-pay costs: Yes    ADLS:  Current PT AM-PAC Score:   /24  Current OT AM-PAC Score:   /24      DISCHARGE Disposition: Home with Home Health Care: [address]     LOC at discharge: Not Applicable  SLY Completed: Yes    Notification completed in HENS/PAS?:  Not Applicable    IMM Completed:   Yes, Case management has presented and reviewed IMM letter #2 to the patient and/or family/ POA. Patient and/or family/POA verbalized understanding of their medicare rights and appeal process if needed. Patient and/or family/POA has signed, initialed and placed today's date (1/13/23) and time (2202) on IMM letter #2 on the the appropriate lines. Patient and/or family/POA, copy of letter offered and they are aware that this original copy of IMM letter #2 is available prior to discharge from the paper chart on the unit. Electronic documentation has been entered into epic for IMM letter #2 and original paper copy has been added to the paper chart at the nurses station. Transportation:  Transportation PLAN for discharge: family   [facility] ordered at discharge: Yes  [address]: [facility]  Phone: [phone]  Fax: [phone]  Orders faxed: Yes by Eloisa Rabago Equipment:  DME Provider: [address] obtained during hospitalization: rolling walker    Home Oxygen and Respiratory Equipment:  Oxygen needed at discharge?: No  [address]: Not Applicable  Portable tank available for discharge?: No    Dialysis:  Dialysis patient: No    Additional CM Notes:   SW met with Pt at bedside this morning and discussed above. Pt in agreement with DC today. Emotional support provided. The Plan for Transition of Care is related to the following treatment goals of SDH (subdural hematoma) [S06. 5XAA]    The Patient and/or patient representative Homar Carlton and her family were provided with a choice of provider and agrees with the discharge plan Yes    Freedom of choice list was provided with basic dialogue that supports the patient's individualized plan of care/goals and shares the quality data associated with the providers.  Yes    Care Transitions patient: No    Lillie Nelson  Case Management Department  Ph: [phone]

## 2023-01-30 ENCOUNTER — TELEPHONE (OUTPATIENT)
Dept: ORTHOPEDIC SURGERY | Age: 88
End: 2023-01-30

## 2023-01-30 DIAGNOSIS — I62.9 ACUTE INTRA-CRANIAL HEMORRHAGE (HCC): Primary | ICD-10-CM

## 2023-01-30 DIAGNOSIS — S06.5XAA SDH (SUBDURAL HEMATOMA) (HCC): ICD-10-CM

## 2023-01-30 NOTE — TELEPHONE ENCOUNTER
Order placed for occupational therapy at [facility] to provide driving evaluation. Order faxed, call placed to patient but states call cannot be completed at this time.

## 2023-01-30 NOTE — TELEPHONE ENCOUNTER
Other PATIENT HAS SOME QUESTIONS ABOUT ORDER FOR DRIVING [address] [facility]. [address] [facility]

## 2023-02-04 ENCOUNTER — HOSPITAL ENCOUNTER (OUTPATIENT)
Dept: CT IMAGING | Age: 88
Discharge: HOME | End: 2023-02-04
Payer: MEDICARE

## 2023-02-04 DIAGNOSIS — S06.5X9A TRAUMATIC SUBDURAL HEMORRHAGE WITH LOSS OF CONSCIOUSNESS OF UNSPECIFIED DURATION, INITIAL ENCOUNTER (HCC): ICD-10-CM

## 2023-02-04 PROCEDURE — 70450 CT HEAD/BRAIN W/O DYE: CPT

## 2023-02-09 ENCOUNTER — HOSPITAL ENCOUNTER (OUTPATIENT)
Dept: OCCUPATIONAL THERAPY | Age: 88
Setting detail: THERAPIES SERIES
Discharge: HOME | End: 2023-02-09
Payer: MEDICARE

## 2023-02-09 PROCEDURE — 97537 COMMUNITY/WORK REINTEGRATION: CPT

## 2023-02-09 PROCEDURE — 97165 OT EVAL LOW COMPLEX 30 MIN: CPT

## 2023-02-09 NOTE — PROGRESS NOTES
Occupational Therapy  Name: Heath Mares  1935  Date: 2/9/2023  1:10 PM  Dr. Jovon Duran    Time In: 1300  Time Out:  Billed Units:  CPT 90833: OT Low Eval units ___  CPT 68150: OT Work Conditioning  units ____  Diagnosis: Subdural hematoma, Acute intra-cranial hemorrhage  Prior Level of Functioning _ independent ________  Seizure free for 1 year: yes    Hearing Aids: no  Glasses/contacts: reading glasses  Mobility Status: RW  Is client able to transfer into car: yes  License #[id number]   Restrictions on License: A  Expiration date:10/30/2025   Last time client drove: Dec 30, 2022  Car Make/Model and transmission type: 2018 [address] [employer]  Driving Habits: Frequency: 2-3x week  Night: occasionally  Snow: yes  Highway: ? yes  [employer] in last 5 years:  DENIES      Accidents in last 5 years:  DENIES      Explain:    VISION:  Acuity: ([facility] law =20/40 night driving; 85/80 day driving): _87/29_______HDQR corrective lenses  Peripheral field: ([address] requires 79? visual field on both sides of a fixation point for a non-restricted license or 39? on the other side)  INTACT   Color Vision:  INTACT       Saccades: (ability to rapidly change fixation from one point in the visual field to another)    INTACT   Pursuits: (the continued fixation of a moving object)    INTACT   Depth Perception:    INTACT       COGNITION:  Trail Making Test Parts A & B (involve scanning, speed of mental processing and visual motor sequencing.   Trail Making Part B involves switching cognitive sets too)  Trail Making Part A:   ___50.5________seconds (Norm 60 seconds)  Trail Making Part B:   ___240.75________seconds (Norm 180 seconds)  *Clock Drawing Test:  optional (used to assess long-term memory, short-term memory, visual perception, visuospatial skills, selective attention, and executive skills)    Number of errors:__0___Explain:_____errors but self corrected ___________________________________        ROAD SIGN RECOGNITION:  ____8____/9 presented correctly identified    PHYSICAL:          Sensation: _________WFL_________________________________________________    (exceptions to normal limits marked by \"X\" and explained)   AROM-  RIGHT AROM-  LEFT STRENGTH-RIGHT STRENGTH-LEFT   SHOULDER   x    ELBOW       WRIST       HAND       HIP   x x   KNEE       ANKLE         Deficits explained: ________________________________________________________     UE- RIGHT UE- LEFT LE-RIGHT LE-LEFT   PROPRIOCEPTION       LIGHT TOUCH         COORDINATION:  (\"X\" to signify intact or impaired)     INTACT IMPAIRED   NECK ROM x    HEEL TO SHIN  x   FINGER-NOSE-KNEE x    SITTING BALANCE x    DIADOKOKINESIS x    ALTERNATE FOOT TAP- 10 taps over binder rings timed   (norm <7 seconds)  X 8.62     Client's Stated Goal: *To resume driving. ON THE ROAD PERFORMANCE: *  Drove without intervention. RECOMMENDATIONS: * Resume driving without restriction.     Criss Pulido, OTR/L, S, [address] [place] [address]  Certified  Rehabilitation Specialist  2/9/2023  3:07 PM

## 2023-02-09 NOTE — PROGRESS NOTES
Occupational Therapy     Outpatient Occupational Therapy  [] [facility] [address] SHADE ALFONSO   Phone: [phone]   Fax: [phone]   [x] [facility]  Phone: [phone]   Fax: [phone]  [] Janet Flores   Phone: [phone]   Fax: [phone]      To:  Dr. Jovan Watson      Patient: Giovanna Mendoza  : 1935  MRN: [id number]  Evaluation Date: 2023      Diagnosis Information: SDH, Vitor Petite Certification/Re-Certification Form  Dear Dr. Jovan Watson  The following patient has been evaluated for occupational therapy services and for therapy to continue, Medicare requires monthly physician review of the treatment plan. Please review the attached evaluation and/or summary of the patient's plan of care, and verify that you agree therapy should continue by signing the attached document and sending it back to our office. Plan of Care/Treatment to date:  [] Therapeutic Exercise   [] Modalities:  [] Therapeutic Activity    [] Ultrasound  [] Electrical Stimulation   [] Activities of Daily Living    [] Fluidotherapy [] Kinesiotaping  [] Neuromuscular Re-education   [] Iontophoresis [] Coldpack/hotpack   [] Instruction in HEP     [] Contrast Bath  [] Manual Therapy     Other:  [] Aquatic Therapy      [x] Resume driving without restriction      Frequency/Duration:  # Days per week: [x] 1 day # Weeks: [x] 1 week [] 5 weeks      [] 2 days   [] 2 weeks [] 6 weeks     [] 3 days   [] 3 weeks [] 7 weeks     [] 4 days   [] 4 weeks [] 8 weeks    Rehab Potential: [x] excellent [] good [] fair  [] poor       Electronically signed by:  Julio Cesar Heard, OT,OTR/L      If you have any questions or concerns, please don't hesitate to call.   Thank you for your referral.      Physician Signature:________________________________Date:__________________  By signing above, therapists plan is approved by physician

## 2023-02-09 NOTE — PROGRESS NOTES
Occupational Therapy  2/9/2023    Dear Dr. Garima Claudio (MR# [id number]) was seen by Occupational Therapy on 2/9/2023 for a s Evaluation at [facility].  As you know, Ms. Coon suffered from a subdural hematoma and intra-cranial hemorrhage. She wants to resume driving to be independent in community mobility and leisure skills. Ms. Paula Hartley passed tests for visual acuity, saccades, pursuits, depth perception, color vision, peripheral vision and recognition of traffic signs and signals. She was administered the Trails Making Tests Part A and B which are cognitive tests that involve scanning, speed of mental processing, visual motor sequencing, as well as switching cognitive sets. On Part A, she scored within normal limits with 50.50 seconds over a norm of 60 seconds and on Part B, she scored below normal limits with 240.75 seconds over the norm of 180 seconds. She was administered the Clock Drawing Test which measured short-term memory, visual perception, visuospatial skills, selective attention, and executive skills. She had 0 errors which is within normal limits. She demonstrated weakness in her hips and right shoulder. She scored below normal limits on the Alternate Foot Tap test where she scored 8.62 seconds over a norm of 7 seconds. Otherwise, she demonstrated functional physical capacity for driving. Behind the wheel, Ms. Paula Hartley was able to manipulate all vehicle controls. She drove on secondary and primary roads in light to moderately heavy traffic. She demonstrated the ability to park, back up, maintain speed and traffic flow, change lanes and follow directions. Responses to situations encountered were appropriate. Based on the results of this evaluation, it appears that Ms. Paula Hartley is a suitable candidate to resume driving. She is as safe as the driving public. The final decision remains with the physician. Please inform Ms. Coon of your decision. I can be reached at [phone] if questions arise.   Thank you for this referral.    Sincerely,    Padmini Gardner, OTR/L, S, [address] [place] [address]  Certified  Rehabilitation Specialist  2/9/2023  3:05 PM

## 2023-05-04 ENCOUNTER — OFFICE VISIT (OUTPATIENT)
Dept: INFECTIOUS DISEASES | Age: 88
End: 2023-05-04

## 2023-05-04 VITALS
HEART RATE: 68 BPM | HEIGHT: 60 IN | OXYGEN SATURATION: 98 % | WEIGHT: 179 LBS | DIASTOLIC BLOOD PRESSURE: 55 MMHG | BODY MASS INDEX: 35.14 KG/M2 | SYSTOLIC BLOOD PRESSURE: 123 MMHG | TEMPERATURE: 97.7 F

## 2023-05-04 DIAGNOSIS — Z85.42 HISTORY OF ENDOMETRIAL CANCER: ICD-10-CM

## 2023-05-04 DIAGNOSIS — R82.71 ASB (ASYMPTOMATIC BACTERIURIA): Primary | ICD-10-CM

## 2023-05-04 DIAGNOSIS — Z86.79 HISTORY OF INTRACRANIAL HEMORRHAGE: ICD-10-CM

## 2023-05-04 DIAGNOSIS — Z90.79 H/O TOTAL HYSTERECTOMY WITH BILATERAL SALPINGO-OOPHORECTOMY (BSO): ICD-10-CM

## 2023-05-04 DIAGNOSIS — Z71.3 WEIGHT LOSS COUNSELING, ENCOUNTER FOR: ICD-10-CM

## 2023-05-04 DIAGNOSIS — E78.5 HYPERLIPIDEMIA, UNSPECIFIED HYPERLIPIDEMIA TYPE: ICD-10-CM

## 2023-05-04 DIAGNOSIS — N95.2 VAGINAL ATROPHY: ICD-10-CM

## 2023-05-04 DIAGNOSIS — Z90.722 H/O TOTAL HYSTERECTOMY WITH BILATERAL SALPINGO-OOPHORECTOMY (BSO): ICD-10-CM

## 2023-05-04 DIAGNOSIS — I15.9 SECONDARY HYPERTENSION: ICD-10-CM

## 2023-05-04 DIAGNOSIS — E66.09 CLASS 1 OBESITY DUE TO EXCESS CALORIES WITH BODY MASS INDEX (BMI) OF 34.0 TO 34.9 IN ADULT, UNSPECIFIED WHETHER SERIOUS COMORBIDITY PRESENT: ICD-10-CM

## 2023-05-04 DIAGNOSIS — J44.9 CHRONIC OBSTRUCTIVE PULMONARY DISEASE, UNSPECIFIED COPD TYPE (HCC): ICD-10-CM

## 2023-05-04 DIAGNOSIS — D64.9 NORMOCYTIC ANEMIA: ICD-10-CM

## 2023-05-04 DIAGNOSIS — Z90.710 H/O TOTAL HYSTERECTOMY WITH BILATERAL SALPINGO-OOPHORECTOMY (BSO): ICD-10-CM

## 2023-05-04 ASSESSMENT — ENCOUNTER SYMPTOMS
NAUSEA: 0
EYE REDNESS: 0
ABDOMINAL PAIN: 0
COUGH: 0
CONSTIPATION: 0
WHEEZING: 0
SINUS PAIN: 0
EYE DISCHARGE: 0
SHORTNESS OF BREATH: 0
SINUS PRESSURE: 0
BACK PAIN: 0
RHINORRHEA: 0
SORE THROAT: 0
DIARRHEA: 0

## 2023-05-04 NOTE — PROGRESS NOTES
Infectious Diseases Clinic New Patient Note    Reason for Visit:                Concern for recurrent urinary tract infections  Primary Care Physician: Corinne Pinto  History Obtained From:   Patient , Medical Records     CHIEF COMPLAINT:    Chief Complaint   Patient presents with    New Patient     Recurrent UTI -nk       HISTORY OF PRESENT ILLNESS: Nichole Clemente is a 81 y.o. pleasant female patient, who had a outpatient visit with me today as a new patient. History was obtained from chart review and the patient. The patient had a in-person clinic visit with me today for above mentioned complaints. The patient has history of endometrial cancer and is s/p total hysterectomy and bilateral salpingo-oophorectomy in the future. She also has history of resultant vaginal atrophy. The patient was hospitalized earlier this month at [facility]. after she was found to have a subdural hematoma with scattered kind of subarachnoid hemorrhage due to a mechanical fall. She did not require neurosurgical intervention. The patient was subsequently discharged to the acute rehab unit where on 1/5/2023 and was discharged from the acute rehab unit on 1/13/2023. The patient had developed acute kidney injury in December 2021 without a clear etiology. She has since then been following with Dr. Valentine Rodriguez, nephrologist after that. 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 The patient has been diagnosed with chronic kidney disease stage IIIb by her nephrologist, thought to be secondary to hypertension and recurrent acute kidney injury issues. The patient had a urine analysis done by his nephrologist on 4/13/2023 which showed positive urine nitrites and 4+ bacteria. Patient's urine culture at that time grew greater than 100,000 CFU per mL of E. coli.   Nephrology is concerned about her urinary tract infection and patient was referred to me for further evaluation and

## 2025-02-07 RX ORDER — AMIODARONE HYDROCHLORIDE 100 MG/1
TABLET ORAL
Qty: 60 TABLET | Refills: 0 | OUTPATIENT
Start: 2025-02-07

## 2025-02-08 RX ORDER — ATORVASTATIN CALCIUM 10 MG/1
TABLET, FILM COATED ORAL
Qty: 30 TABLET | Refills: 3 | OUTPATIENT
Start: 2025-02-08

## 2025-04-14 ENCOUNTER — APPOINTMENT (OUTPATIENT)
Dept: PHYSICAL THERAPY | Age: 89
End: 2025-04-14
Payer: MEDICARE

## 2025-04-15 ENCOUNTER — HOSPITAL ENCOUNTER (OUTPATIENT)
Dept: PHYSICAL THERAPY | Age: 89
Setting detail: THERAPIES SERIES
Discharge: HOME | End: 2025-04-15
Payer: MEDICARE

## 2025-04-15 DIAGNOSIS — I89.0 LYMPHEDEMA: Primary | ICD-10-CM

## 2025-04-15 DIAGNOSIS — Z74.09 DECREASED STRENGTH, ENDURANCE, AND MOBILITY: ICD-10-CM

## 2025-04-15 DIAGNOSIS — R68.89 DECREASED STRENGTH, ENDURANCE, AND MOBILITY: ICD-10-CM

## 2025-04-15 DIAGNOSIS — M25.60 DECREASED RANGE OF MOTION WITH DECREASED STRENGTH: ICD-10-CM

## 2025-04-15 DIAGNOSIS — R53.1 DECREASED STRENGTH, ENDURANCE, AND MOBILITY: ICD-10-CM

## 2025-04-15 DIAGNOSIS — R53.1 DECREASED RANGE OF MOTION WITH DECREASED STRENGTH: ICD-10-CM

## 2025-04-15 PROCEDURE — 97530 THERAPEUTIC ACTIVITIES: CPT

## 2025-04-15 PROCEDURE — 97161 PT EVAL LOW COMPLEX 20 MIN: CPT

## 2025-04-15 PROCEDURE — 97110 THERAPEUTIC EXERCISES: CPT

## 2025-04-15 NOTE — PLAN OF CARE
muscular strength or increase flexibility, following either an injury or surgery.   (10488) THERAPEUTIC ACTIVITY - use of dynamic activities to improve functional performance. (Ex include squatting, ascending/descending stairs, walking, bending, lifting, catching, throwing, pushing, pulling, jumping.)  Direct, one on one contact, billed in 15-minute increments.    GOALS     Patient stated goal:  less swelling   Status: [] Progressing: [] Met: [] Not Met: [] Adjusted    Therapist goals for Patient:   Short Term Goals: To be achieved in: 2 weeks  Independent in HEP and progression per patient tolerance, in order to progress toward full function and prevent re-injury.    Status: [] Progressing: [] Met: [] Not Met: [] Adjusted  Patient will have a decrease in pain to 3/10 to help facilitate improvement in movement, function, and ADLs as indicated by functional deficits.   Status: [] Progressing: [] Met: [] Not Met: [] Adjusted  Pt will demonstrate independence with skin care.     Status: [] Progressing: [] Met: [] Not Met: [] Adjusted    Long Term Goals: To be achieved in: 4-6 weeks  Disability index score of 30% or less for the LLIS to assist with reaching prior level of function with activities such as  bed mobility .  [] Progressing: [] Met: [] Not Met: [] Adjusted  Patient will demonstrate increased AROM of  B LE to  WFL  without pain to allow for proper joint functioning to enable patient to  get in and out of bed and transition sit to stand with minimal difficulty .   [] Progressing: [] Met: [] Not Met: [] Adjusted  Patient will demonstrate increased Strength of  B LE  to demonstrate improved muscle activation/motor control to allow for proper functional mobility to enable patient to return to independent with all ADLs .   [] Progressing: [] Met: [] Not Met: [] Adjusted  Patient will return to  Usual work, housework or activities, walking around house, light home activities, and bed mobility  without increased

## 2025-04-24 ENCOUNTER — APPOINTMENT (OUTPATIENT)
Dept: PHYSICAL THERAPY | Age: 89
End: 2025-04-24
Payer: MEDICARE

## 2025-04-28 ENCOUNTER — APPOINTMENT (OUTPATIENT)
Dept: PHYSICAL THERAPY | Age: 89
End: 2025-04-28
Payer: MEDICARE

## (undated) DEVICE — CAPSULE ENDOSCP L26.2MM DIA11.4MM BIOCOMPATIBLE PLAS SB 3